# Patient Record
Sex: FEMALE | Race: OTHER | NOT HISPANIC OR LATINO | URBAN - METROPOLITAN AREA
[De-identification: names, ages, dates, MRNs, and addresses within clinical notes are randomized per-mention and may not be internally consistent; named-entity substitution may affect disease eponyms.]

---

## 2022-11-25 ENCOUNTER — INPATIENT (INPATIENT)
Facility: HOSPITAL | Age: 62
LOS: 13 days | Discharge: HOME | DRG: 871 | End: 2022-12-09
Payer: COMMERCIAL

## 2022-11-25 VITALS
HEART RATE: 95 BPM | WEIGHT: 182.1 LBS | OXYGEN SATURATION: 93 % | SYSTOLIC BLOOD PRESSURE: 147 MMHG | HEIGHT: 68 IN | DIASTOLIC BLOOD PRESSURE: 89 MMHG | TEMPERATURE: 100 F | RESPIRATION RATE: 22 BRPM

## 2022-11-25 DIAGNOSIS — Z90.49 ACQUIRED ABSENCE OF OTHER SPECIFIED PARTS OF DIGESTIVE TRACT: Chronic | ICD-10-CM

## 2022-11-25 DIAGNOSIS — J90 PLEURAL EFFUSION, NOT ELSEWHERE CLASSIFIED: ICD-10-CM

## 2022-11-25 DIAGNOSIS — E89.2 POSTPROCEDURAL HYPOPARATHYROIDISM: Chronic | ICD-10-CM

## 2022-11-25 DIAGNOSIS — N60.09 SOLITARY CYST OF UNSPECIFIED BREAST: Chronic | ICD-10-CM

## 2022-11-25 DIAGNOSIS — Z29.9 ENCOUNTER FOR PROPHYLACTIC MEASURES, UNSPECIFIED: ICD-10-CM

## 2022-11-25 DIAGNOSIS — R65.10 SYSTEMIC INFLAMMATORY RESPONSE SYNDROME (SIRS) OF NON-INFECTIOUS ORIGIN WITHOUT ACUTE ORGAN DYSFUNCTION: ICD-10-CM

## 2022-11-25 DIAGNOSIS — J86.9 PYOTHORAX WITHOUT FISTULA: ICD-10-CM

## 2022-11-25 DIAGNOSIS — K57.90 DIVERTICULOSIS OF INTESTINE, PART UNSPECIFIED, WITHOUT PERFORATION OR ABSCESS WITHOUT BLEEDING: ICD-10-CM

## 2022-11-25 DIAGNOSIS — N63.10 UNSPECIFIED LUMP IN THE RIGHT BREAST, UNSPECIFIED QUADRANT: ICD-10-CM

## 2022-11-25 DIAGNOSIS — K50.90 CROHN'S DISEASE, UNSPECIFIED, WITHOUT COMPLICATIONS: ICD-10-CM

## 2022-11-25 LAB
ACANTHOCYTES BLD QL SMEAR: SLIGHT — SIGNIFICANT CHANGE UP
ALBUMIN SERPL ELPH-MCNC: 3.5 G/DL — SIGNIFICANT CHANGE UP (ref 3.3–5)
ALP SERPL-CCNC: 112 U/L — SIGNIFICANT CHANGE UP (ref 40–120)
ALT FLD-CCNC: 18 U/L — SIGNIFICANT CHANGE UP (ref 10–45)
ANION GAP SERPL CALC-SCNC: 11 MMOL/L — SIGNIFICANT CHANGE UP (ref 5–17)
APTT BLD: 29.2 SEC — SIGNIFICANT CHANGE UP (ref 27.5–35.5)
AST SERPL-CCNC: 22 U/L — SIGNIFICANT CHANGE UP (ref 10–40)
BASOPHILS # BLD AUTO: 0 K/UL — SIGNIFICANT CHANGE UP (ref 0–0.2)
BASOPHILS NFR BLD AUTO: 0 % — SIGNIFICANT CHANGE UP (ref 0–2)
BILIRUB SERPL-MCNC: 0.3 MG/DL — SIGNIFICANT CHANGE UP (ref 0.2–1.2)
BLD GP AB SCN SERPL QL: NEGATIVE — SIGNIFICANT CHANGE UP
BUN SERPL-MCNC: 7 MG/DL — SIGNIFICANT CHANGE UP (ref 7–23)
BURR CELLS BLD QL SMEAR: PRESENT — SIGNIFICANT CHANGE UP
CALCIUM SERPL-MCNC: 9.7 MG/DL — SIGNIFICANT CHANGE UP (ref 8.4–10.5)
CHLORIDE SERPL-SCNC: 102 MMOL/L — SIGNIFICANT CHANGE UP (ref 96–108)
CO2 SERPL-SCNC: 26 MMOL/L — SIGNIFICANT CHANGE UP (ref 22–31)
CREAT SERPL-MCNC: 0.58 MG/DL — SIGNIFICANT CHANGE UP (ref 0.5–1.3)
CRP SERPL-MCNC: 320 MG/L — HIGH (ref 0–4)
DACRYOCYTES BLD QL SMEAR: SLIGHT — SIGNIFICANT CHANGE UP
EGFR: 104 ML/MIN/1.73M2 — SIGNIFICANT CHANGE UP
EOSINOPHIL # BLD AUTO: 0.18 K/UL — SIGNIFICANT CHANGE UP (ref 0–0.5)
EOSINOPHIL NFR BLD AUTO: 0.9 % — SIGNIFICANT CHANGE UP (ref 0–6)
GIANT PLATELETS BLD QL SMEAR: PRESENT — SIGNIFICANT CHANGE UP
GLUCOSE SERPL-MCNC: 98 MG/DL — SIGNIFICANT CHANGE UP (ref 70–99)
HCT VFR BLD CALC: 36.3 % — SIGNIFICANT CHANGE UP (ref 34.5–45)
HGB BLD-MCNC: 11.7 G/DL — SIGNIFICANT CHANGE UP (ref 11.5–15.5)
INR BLD: 1.16 — SIGNIFICANT CHANGE UP (ref 0.88–1.16)
LYMPHOCYTES # BLD AUTO: 17.7 % — SIGNIFICANT CHANGE UP (ref 13–44)
LYMPHOCYTES # BLD AUTO: 3.46 K/UL — HIGH (ref 1–3.3)
MANUAL SMEAR VERIFICATION: SIGNIFICANT CHANGE UP
MCHC RBC-ENTMCNC: 29.4 PG — SIGNIFICANT CHANGE UP (ref 27–34)
MCHC RBC-ENTMCNC: 32.2 GM/DL — SIGNIFICANT CHANGE UP (ref 32–36)
MCV RBC AUTO: 91.2 FL — SIGNIFICANT CHANGE UP (ref 80–100)
MONOCYTES # BLD AUTO: 2.43 K/UL — HIGH (ref 0–0.9)
MONOCYTES NFR BLD AUTO: 12.4 % — SIGNIFICANT CHANGE UP (ref 2–14)
NEUTROPHILS # BLD AUTO: 13.5 K/UL — HIGH (ref 1.8–7.4)
NEUTROPHILS NFR BLD AUTO: 69 % — SIGNIFICANT CHANGE UP (ref 43–77)
NRBC # BLD: 1 /100 — HIGH (ref 0–0)
NRBC # BLD: SIGNIFICANT CHANGE UP /100 WBCS (ref 0–0)
PLAT MORPH BLD: ABNORMAL
PLATELET # BLD AUTO: 491 K/UL — HIGH (ref 150–400)
POIKILOCYTOSIS BLD QL AUTO: SLIGHT — SIGNIFICANT CHANGE UP
POTASSIUM SERPL-MCNC: 5.1 MMOL/L — SIGNIFICANT CHANGE UP (ref 3.5–5.3)
POTASSIUM SERPL-SCNC: 5.1 MMOL/L — SIGNIFICANT CHANGE UP (ref 3.5–5.3)
PROCALCITONIN SERPL-MCNC: 0.18 NG/ML — HIGH (ref 0.02–0.1)
PROT SERPL-MCNC: 7.8 G/DL — SIGNIFICANT CHANGE UP (ref 6–8.3)
PROTHROM AB SERPL-ACNC: 13.8 SEC — HIGH (ref 10.5–13.4)
RBC # BLD: 3.98 M/UL — SIGNIFICANT CHANGE UP (ref 3.8–5.2)
RBC # FLD: 13.2 % — SIGNIFICANT CHANGE UP (ref 10.3–14.5)
RBC BLD AUTO: ABNORMAL
RH IG SCN BLD-IMP: POSITIVE — SIGNIFICANT CHANGE UP
SARS-COV-2 RNA SPEC QL NAA+PROBE: NEGATIVE — SIGNIFICANT CHANGE UP
SCHISTOCYTES BLD QL AUTO: SIGNIFICANT CHANGE UP
SODIUM SERPL-SCNC: 139 MMOL/L — SIGNIFICANT CHANGE UP (ref 135–145)
SPHEROCYTES BLD QL SMEAR: SLIGHT — SIGNIFICANT CHANGE UP
TARGETS BLD QL SMEAR: SLIGHT — SIGNIFICANT CHANGE UP
WBC # BLD: 19.56 K/UL — HIGH (ref 3.8–10.5)
WBC # FLD AUTO: 19.56 K/UL — HIGH (ref 3.8–10.5)

## 2022-11-25 PROCEDURE — 71045 X-RAY EXAM CHEST 1 VIEW: CPT | Mod: 26

## 2022-11-25 PROCEDURE — 99285 EMERGENCY DEPT VISIT HI MDM: CPT | Mod: 25

## 2022-11-25 RX ORDER — PIPERACILLIN AND TAZOBACTAM 4; .5 G/20ML; G/20ML
3.38 INJECTION, POWDER, LYOPHILIZED, FOR SOLUTION INTRAVENOUS ONCE
Refills: 0 | Status: COMPLETED | OUTPATIENT
Start: 2022-11-25 | End: 2022-11-25

## 2022-11-25 RX ORDER — BUDESONIDE AND FORMOTEROL FUMARATE DIHYDRATE 160; 4.5 UG/1; UG/1
2 AEROSOL RESPIRATORY (INHALATION)
Refills: 0 | Status: DISCONTINUED | OUTPATIENT
Start: 2022-11-25 | End: 2022-12-09

## 2022-11-25 RX ORDER — TRAMADOL HYDROCHLORIDE 50 MG/1
25 TABLET ORAL ONCE
Refills: 0 | Status: DISCONTINUED | OUTPATIENT
Start: 2022-11-25 | End: 2022-11-25

## 2022-11-25 RX ORDER — PIPERACILLIN AND TAZOBACTAM 4; .5 G/20ML; G/20ML
3.38 INJECTION, POWDER, LYOPHILIZED, FOR SOLUTION INTRAVENOUS EVERY 8 HOURS
Refills: 0 | Status: DISCONTINUED | OUTPATIENT
Start: 2022-11-25 | End: 2022-12-01

## 2022-11-25 RX ORDER — SODIUM CHLORIDE 9 MG/ML
1000 INJECTION, SOLUTION INTRAVENOUS
Refills: 0 | Status: DISCONTINUED | OUTPATIENT
Start: 2022-11-25 | End: 2022-11-27

## 2022-11-25 RX ORDER — ACETAMINOPHEN 500 MG
650 TABLET ORAL EVERY 6 HOURS
Refills: 0 | Status: DISCONTINUED | OUTPATIENT
Start: 2022-11-25 | End: 2022-12-09

## 2022-11-25 RX ORDER — ACETAMINOPHEN 500 MG
650 TABLET ORAL ONCE
Refills: 0 | Status: COMPLETED | OUTPATIENT
Start: 2022-11-25 | End: 2022-11-25

## 2022-11-25 RX ORDER — BUDESONIDE AND FORMOTEROL FUMARATE DIHYDRATE 160; 4.5 UG/1; UG/1
2 AEROSOL RESPIRATORY (INHALATION)
Qty: 0 | Refills: 0 | DISCHARGE

## 2022-11-25 RX ADMIN — Medication 650 MILLIGRAM(S): at 17:00

## 2022-11-25 RX ADMIN — TRAMADOL HYDROCHLORIDE 25 MILLIGRAM(S): 50 TABLET ORAL at 23:10

## 2022-11-25 RX ADMIN — SODIUM CHLORIDE 120 MILLILITER(S): 9 INJECTION, SOLUTION INTRAVENOUS at 19:41

## 2022-11-25 RX ADMIN — PIPERACILLIN AND TAZOBACTAM 25 GRAM(S): 4; .5 INJECTION, POWDER, LYOPHILIZED, FOR SOLUTION INTRAVENOUS at 23:43

## 2022-11-25 RX ADMIN — PIPERACILLIN AND TAZOBACTAM 200 GRAM(S): 4; .5 INJECTION, POWDER, LYOPHILIZED, FOR SOLUTION INTRAVENOUS at 17:05

## 2022-11-25 RX ADMIN — TRAMADOL HYDROCHLORIDE 25 MILLIGRAM(S): 50 TABLET ORAL at 22:03

## 2022-11-25 NOTE — H&P ADULT - PROBLEM SELECTOR PLAN 2
Pt with low-grade temperatures (Tmax 99F), acute on chronic cough, CP, dyspnea, and weakness x1 week. CXR with L pleural effusion. CT chest/abdomen at outside hospital on 11/23 with L pleural effusion and probable partially cystic mass at L lung base.    - see "SIRS" plan Pt with low-grade temperatures (Tmax 99F), acute on chronic cough, CP, dyspnea, and weakness x1 week. CXR with L pleural effusion. CT chest/abdomen at outside hospital on 11/23 with L pleural effusion and probable partially cystic mass at L lung base.     - see "SIRS" plan Pt with low-grade temperatures (Tmax 99F), acute on chronic cough, CP, dyspnea, and weakness x1 week. CXR with L pleural effusion. CT chest/abdomen at outside hospital on 11/23 with L pleural effusion and probable partially cystic mass at L lung base. Was diagnosed with empyema at outside hospital and sent home with Augmentin and azithromycin.    - see "SIRS" plan

## 2022-11-25 NOTE — H&P ADULT - PROBLEM SELECTOR PLAN 6
Plan:  F: prn  E: replete K<4, Mg<2  N: regular  VTE Prophylaxis: Lovenox 40mg q24h   GI: none  C: Full Code  D: RENE Plan:  F: prn  E: replete K<4, Mg<2  N: regular; NPO at midnight for possible procedure tomorrow   VTE Prophylaxis: Hold AC for possible procedure tomorrow   GI: none  C: Full Code  D: RENE Plan:  F: LR @120cc/hr x10hr  E: replete K<4, Mg<2  N: regular; NPO at midnight for possible procedure tomorrow   VTE Prophylaxis: Hold AC for possible procedure tomorrow   GI: none  C: Full Code  D: [facility] CT chest/abdomen on 11/23 at outside hospital with colonic diverticulosis. Pt asymptomatic currently.     - F/u outpt

## 2022-11-25 NOTE — ED ADULT NURSE NOTE - OBJECTIVE STATEMENT
63 y/o F, c/o SOB, had CT/ XR on 11/23 showing L sided pleural effusion, empyema, L sided cystic mass. Here today co worsening SOB and CP. Denies fevers, chills, productive cough.

## 2022-11-25 NOTE — H&P ADULT - NSICDXFAMILYHX_GEN_ALL_CORE_FT
FAMILY HISTORY:  Father  Still living? Unknown  FH: lung cancer, Age at diagnosis: Age Unknown    Mother  Still living? Unknown  Family history of CLL (chronic lymphoid leukemia), Age at diagnosis: Age Unknown

## 2022-11-25 NOTE — H&P ADULT - PROBLEM SELECTOR PLAN 5
CT chest/abdomen on 11/23 at outside hospital with colonic diverticulosis. Pt asymptomatic currently.     - F/u outpt Pt recently diagnosed with Crohn's disease on outpt colonoscopy (7/26/2022). Pt asymptomatic currently, not on any meds.    - F/u outpt

## 2022-11-25 NOTE — H&P ADULT - ASSESSMENT
63 y/o F with PMHx of Crohn's, diverticulosis, asthma, and benign breast cyst (in 1989) presented with acute on chronic cough, weakness, dyspnea, and chest pain x 1 week, CXR with L pleural effusion, admitted for further management.  61 y/o F with PMHx of Crohn's, diverticulosis, asthma, and benign breast cyst (in 1989) presented with acute on chronic cough, weakness, dyspnea, and chest pain x 1 week, CXR with L pleural effusion, found to meet SIRS criteria, likely 2/2 empyema and L pleural effusion.

## 2022-11-25 NOTE — ED PROVIDER NOTE - OBJECTIVE STATEMENT
63 y/o F, PMHx of diverticulitis s/p cholecystectomy, pre-DM, now presenting to ED for SOB and chest pain. At presentation, pt has CT/XR documents showing L sided PE, empyema, and L sided cystic mass. Pt was Rx on Augment and Z-Yazan. She reports feeling very dehydrated last night and was told to come to the ED for further evaluation. 63 y/o F, PMHx of diverticulitis s/p cholecystectomy, pre-DM, now presenting to ED for SOB and chest pain- sx onset 5-6 days ago- had L upper abd pain- had CT - chest abd/pelvis  showing L sided Pleural effusion, empyema, and L sided cystic mass. Pt was Rx on Augment and Z-Yazan. She reports feeling very dehydrated last night and was told to come to the ED for further evaluation  co sob int cough  no n/v   + fever  breathing improves with sitting up/worsens w laying down

## 2022-11-25 NOTE — ED ADULT TRIAGE NOTE - CHIEF COMPLAINT QUOTE
Pt had CT/ XR on 11/23 showing L sided pleural effusion, empyema, L sided cystic mass. Here today co worsening SOB and CP. Denies fevers, chills, productive cough.

## 2022-11-25 NOTE — H&P ADULT - NSHPPHYSICALEXAM_GEN_ALL_CORE
.  VITAL SIGNS:  T(F): 99.1 (11-25-22 @ 17:12), Max: 99.8 (11-25-22 @ 14:36)  HR: 92 (11-25-22 @ 17:12) (92 - 95)  BP: 117/72 (11-25-22 @ 17:12) (117/72 - 147/89)  BP(mean): --  RR: 18 (11-25-22 @ 17:12) (18 - 22)  SpO2: 98% (11-25-22 @ 17:12) (93% - 98%)    PHYSICAL EXAM:    Constitutional: resting comfortably in bed; NAD  HEENT: NC/AT, PERRL, EOMI, anicteric sclera, no nasal discharge; uvula midline, no oropharyngeal erythema or exudates; MMM  Neck: supple; no JVD or thyromegaly  Respiratory: unlabored breathing, CTA B/L; no W/Rhonchi/Crackles, no retractions or use of accessory muscles   Cardiac: +S1/S2; RRR; no M/R/G; No ventricular heaves, PMI non-displaced  Gastrointestinal: soft, NT/ND; no rebound or guarding; +BSx4  Genitourinary: normal external genitalia  Back: spine midline, no bony tenderness or step-offs; no CVAT B/L  Extremities: WWP, no clubbing or cyanosis; no peripheral edema  Musculoskeletal: NROM x4; no joint swelling, tenderness or erythema  Vascular: 2+ radial, DP/PT pulses B/L  Dermatologic: skin warm, dry and intact; no rashes, wounds, or scars  Lymphatic: no submandibular or cervical LAD  Neurologic: AAOx3; CNII-XII grossly intact; no focal deficits  Psychiatric: affect and characteristics of appearance, verbalizations, behaviors are appropriate .  VITAL SIGNS:  T(F): 99.1 (11-25-22 @ 17:12), Max: 99.8 (11-25-22 @ 14:36)  HR: 92 (11-25-22 @ 17:12) (92 - 95)  BP: 117/72 (11-25-22 @ 17:12) (117/72 - 147/89)  BP(mean): --  RR: 18 (11-25-22 @ 17:12) (18 - 22)  SpO2: 98% (11-25-22 @ 17:12) (93% - 98%)    PHYSICAL EXAM:    Constitutional: resting comfortably in bed on 2L NC; NAD  HEENT: NC/AT, PERRL, EOMI, anicteric sclera, no nasal discharge; MMM  Neck: supple; no JVD  Respiratory: unlabored breathing, decreased breath sounds to left lung; no wheezing, rhonchi, or crackles, no intercostal retractions, no accessory muscle use, no nasal flaring  Cardiac: +S1/S2; RRR; no M/R/G  Gastrointestinal: soft, NT/ND; no rebound or guarding; +BS  Extremities: no clubbing or cyanosis; no peripheral edema  Vascular: 2+ radial pulses B/L  Dermatologic: skin warm, dry and intact  Neurologic: AAOx3; CNII-XII grossly intact; no focal deficits  Psychiatric: affect and characteristics of appearance, verbalizations, behaviors are appropriate

## 2022-11-25 NOTE — H&P ADULT - NSHPLABSRESULTS_GEN_ALL_CORE
.  LABS:                         11.7   19.56 )-----------( 491      ( 25 Nov 2022 15:12 )             36.3     11-25    139  |  102  |  7   ----------------------------<  98  5.1   |  26  |  0.58    Ca    9.7      25 Nov 2022 15:12    TPro  7.8  /  Alb  3.5  /  TBili  0.3  /  DBili  x   /  AST  22  /  ALT  18  /  AlkPhos  112  11-25    PT/INR - ( 25 Nov 2022 15:28 )   PT: 13.8 sec;   INR: 1.16          PTT - ( 25 Nov 2022 15:28 )  PTT:29.2 sec              RADIOLOGY, EKG & ADDITIONAL TESTS: Reviewed.

## 2022-11-25 NOTE — H&P ADULT - NSHPREVIEWOFSYSTEMS_GEN_ALL_CORE
Constitutional: +low-grade temperatures (Tmax 99F), weakness  Cardiac: +CP  Resp: +dyspnea, productive cough  Abd: No abd pain, N/V/D  : No dysuria  Neuro: No headache or dizziness

## 2022-11-25 NOTE — H&P ADULT - PROBLEM SELECTOR PLAN 1
Pt met 3/4 SIRS criteria on admission: HR 95, RR 22, WBC 19k. Pt with low-grade temperatures (Tmax 99F), acute on chronic cough, CP, dyspnea, and weakness x1 week. CXR with L pleural effusion. CT chest/abdomen at outside hospital on 11/23 with L pleural effusion and probable partially cystic mass at L lung base. Sx likely secondary to empyema. s/p zosyn 3.375 x1 in ED.     Plan:   - c/w zosyn 3.375g q6h  - F/u BCx  - Plan for bronch tomorrow Pt met 3/4 SIRS criteria on admission: HR 95, RR 22, WBC 19k. Pt with low-grade temperatures (Tmax 99F), acute on chronic cough, CP, dyspnea, and weakness x1 week. CXR with L pleural effusion. CT chest/abdomen at outside hospital on 11/23 with L pleural effusion and probable partially cystic mass at L lung base. Sx likely secondary to empyema. s/p zosyn 3.375 x1 in ED.     Plan:   - c/w zosyn 3.375g q6h  - LR @ 120cc/hr x10h   - F/u BCx  - F/u urine Strep, Legionella  - F/u MRSA swab   - Pulm consult in AM  - NPO at midnight and hold AC; plan for possible bronchoscopy and/or thoracentesis in AM Pt met 3/4 SIRS criteria on admission: HR 95, RR 22, WBC 19k. Pt with low-grade temperatures (Tmax 99F), acute on chronic cough, CP, dyspnea, and weakness x1 week. CXR with L pleural effusion. CT chest/abdomen at outside hospital on 11/23 with L pleural effusion and probable partially cystic mass at L lung base. Sx likely secondary to empyema. s/p zosyn 3.375 x1 in ED.     Plan:   - c/w zosyn 3.375g q6h  - LR @ 120cc/hr x10h   - F/u BCx  - F/u urine Strep, Legionella  - F/u MRSA swab, RVP   - Pulm consult in AM  - NPO at midnight and hold AC; plan for possible bronchoscopy and/or thoracentesis in AM

## 2022-11-25 NOTE — ED PROVIDER NOTE - RESPIRATORY, MLM
Decreased breath sounds to Lower left lung. No respiratory effort noted. Decreased breath sounds to Lower left lung. Normal respiratory effort noted.

## 2022-11-25 NOTE — PATIENT PROFILE ADULT - HOME ACCESSIBILITY CONCERNS - OTHER
has bathroom downstairs and upstairs but none on main level and has been very SOB/difficult to get to BR

## 2022-11-25 NOTE — H&P ADULT - NSICDXPASTSURGICALHX_GEN_ALL_CORE_FT
PAST SURGICAL HISTORY:  Benign cyst of breast     History of cholecystectomy     S/P parathyroidectomy

## 2022-11-25 NOTE — H&P ADULT - PROBLEM SELECTOR PLAN 7
Plan:  F: LR @120cc/hr x10hr  E: replete K<4, Mg<2  N: regular; NPO at midnight for possible procedure tomorrow   VTE Prophylaxis: Hold AC for possible procedure tomorrow   GI: none  C: Full Code  D: [facility]

## 2022-11-25 NOTE — H&P ADULT - PROBLEM SELECTOR PLAN 3
CT chest/abdomen at outside hospital with retroareolar mass. Per patient, last mammogram ~4 years ago, nml.     - F/u outpt for further workup Pt with low-grade temperatures (Tmax 99F), acute on chronic cough, CP, dyspnea, and weakness x1 week. CXR with L pleural effusion. CT chest/abdomen at outside hospital on 11/23 with L pleural effusion and probable partially cystic mass at L lung base.     - see "SIRS" plan

## 2022-11-25 NOTE — H&P ADULT - PROBLEM SELECTOR PLAN 4
Pt recently diagnosed with Crohn's disease on outpt colonoscopy (7/26/2022). Pt asymptomatic currently, not on any meds.    - F/u outpt CT chest/abdomen at outside hospital with retroareolar mass. Per patient, last mammogram ~4 years ago, nml.     - F/u outpt for further workup

## 2022-11-26 LAB
ALBUMIN SERPL ELPH-MCNC: 3.1 G/DL — LOW (ref 3.3–5)
ALP SERPL-CCNC: 108 U/L — SIGNIFICANT CHANGE UP (ref 40–120)
ALT FLD-CCNC: 22 U/L — SIGNIFICANT CHANGE UP (ref 10–45)
ANION GAP SERPL CALC-SCNC: 8 MMOL/L — SIGNIFICANT CHANGE UP (ref 5–17)
APTT BLD: 29.4 SEC — SIGNIFICANT CHANGE UP (ref 27.5–35.5)
AST SERPL-CCNC: 19 U/L — SIGNIFICANT CHANGE UP (ref 10–40)
BASOPHILS # BLD AUTO: 0.04 K/UL — SIGNIFICANT CHANGE UP (ref 0–0.2)
BASOPHILS NFR BLD AUTO: 0.2 % — SIGNIFICANT CHANGE UP (ref 0–2)
BILIRUB SERPL-MCNC: 0.4 MG/DL — SIGNIFICANT CHANGE UP (ref 0.2–1.2)
BLD GP AB SCN SERPL QL: NEGATIVE — SIGNIFICANT CHANGE UP
BUN SERPL-MCNC: 6 MG/DL — LOW (ref 7–23)
CALCIUM SERPL-MCNC: 8.8 MG/DL — SIGNIFICANT CHANGE UP (ref 8.4–10.5)
CHLORIDE SERPL-SCNC: 102 MMOL/L — SIGNIFICANT CHANGE UP (ref 96–108)
CO2 SERPL-SCNC: 27 MMOL/L — SIGNIFICANT CHANGE UP (ref 22–31)
CREAT SERPL-MCNC: 0.71 MG/DL — SIGNIFICANT CHANGE UP (ref 0.5–1.3)
EGFR: 97 ML/MIN/1.73M2 — SIGNIFICANT CHANGE UP
EOSINOPHIL # BLD AUTO: 0.15 K/UL — SIGNIFICANT CHANGE UP (ref 0–0.5)
EOSINOPHIL NFR BLD AUTO: 0.9 % — SIGNIFICANT CHANGE UP (ref 0–6)
GLUCOSE SERPL-MCNC: 120 MG/DL — HIGH (ref 70–99)
HCT VFR BLD CALC: 33 % — LOW (ref 34.5–45)
HCV AB S/CO SERPL IA: 0.03 S/CO — SIGNIFICANT CHANGE UP
HCV AB SERPL-IMP: SIGNIFICANT CHANGE UP
HGB BLD-MCNC: 10.4 G/DL — LOW (ref 11.5–15.5)
IMM GRANULOCYTES NFR BLD AUTO: 0.6 % — SIGNIFICANT CHANGE UP (ref 0–0.9)
INR BLD: 1.38 — HIGH (ref 0.88–1.16)
LYMPHOCYTES # BLD AUTO: 16.7 % — SIGNIFICANT CHANGE UP (ref 13–44)
LYMPHOCYTES # BLD AUTO: 2.79 K/UL — SIGNIFICANT CHANGE UP (ref 1–3.3)
MAGNESIUM SERPL-MCNC: 2 MG/DL — SIGNIFICANT CHANGE UP (ref 1.6–2.6)
MCHC RBC-ENTMCNC: 29.1 PG — SIGNIFICANT CHANGE UP (ref 27–34)
MCHC RBC-ENTMCNC: 31.5 GM/DL — LOW (ref 32–36)
MCV RBC AUTO: 92.2 FL — SIGNIFICANT CHANGE UP (ref 80–100)
MONOCYTES # BLD AUTO: 2 K/UL — HIGH (ref 0–0.9)
MONOCYTES NFR BLD AUTO: 11.9 % — SIGNIFICANT CHANGE UP (ref 2–14)
NEUTROPHILS # BLD AUTO: 11.67 K/UL — HIGH (ref 1.8–7.4)
NEUTROPHILS NFR BLD AUTO: 69.7 % — SIGNIFICANT CHANGE UP (ref 43–77)
NRBC # BLD: 0 /100 WBCS — SIGNIFICANT CHANGE UP (ref 0–0)
PHOSPHATE SERPL-MCNC: 3.3 MG/DL — SIGNIFICANT CHANGE UP (ref 2.5–4.5)
PLATELET # BLD AUTO: 558 K/UL — HIGH (ref 150–400)
POTASSIUM SERPL-MCNC: 4.7 MMOL/L — SIGNIFICANT CHANGE UP (ref 3.5–5.3)
POTASSIUM SERPL-SCNC: 4.7 MMOL/L — SIGNIFICANT CHANGE UP (ref 3.5–5.3)
PROT SERPL-MCNC: 6.6 G/DL — SIGNIFICANT CHANGE UP (ref 6–8.3)
PROTHROM AB SERPL-ACNC: 16.5 SEC — HIGH (ref 10.5–13.4)
RBC # BLD: 3.58 M/UL — LOW (ref 3.8–5.2)
RBC # FLD: 13.1 % — SIGNIFICANT CHANGE UP (ref 10.3–14.5)
RH IG SCN BLD-IMP: POSITIVE — SIGNIFICANT CHANGE UP
SODIUM SERPL-SCNC: 137 MMOL/L — SIGNIFICANT CHANGE UP (ref 135–145)
WBC # BLD: 16.75 K/UL — HIGH (ref 3.8–10.5)
WBC # FLD AUTO: 16.75 K/UL — HIGH (ref 3.8–10.5)

## 2022-11-26 PROCEDURE — 99232 SBSQ HOSP IP/OBS MODERATE 35: CPT

## 2022-11-26 RX ORDER — ACETAMINOPHEN 500 MG
1000 TABLET ORAL ONCE
Refills: 0 | Status: COMPLETED | OUTPATIENT
Start: 2022-11-26 | End: 2022-11-26

## 2022-11-26 RX ADMIN — Medication 650 MILLIGRAM(S): at 16:40

## 2022-11-26 RX ADMIN — PIPERACILLIN AND TAZOBACTAM 25 GRAM(S): 4; .5 INJECTION, POWDER, LYOPHILIZED, FOR SOLUTION INTRAVENOUS at 06:27

## 2022-11-26 RX ADMIN — Medication 100 MILLIGRAM(S): at 14:12

## 2022-11-26 RX ADMIN — PIPERACILLIN AND TAZOBACTAM 25 GRAM(S): 4; .5 INJECTION, POWDER, LYOPHILIZED, FOR SOLUTION INTRAVENOUS at 22:57

## 2022-11-26 RX ADMIN — BUDESONIDE AND FORMOTEROL FUMARATE DIHYDRATE 2 PUFF(S): 160; 4.5 AEROSOL RESPIRATORY (INHALATION) at 06:27

## 2022-11-26 RX ADMIN — Medication 400 MILLIGRAM(S): at 06:50

## 2022-11-26 RX ADMIN — Medication 1000 MILLIGRAM(S): at 08:45

## 2022-11-26 RX ADMIN — PIPERACILLIN AND TAZOBACTAM 25 GRAM(S): 4; .5 INJECTION, POWDER, LYOPHILIZED, FOR SOLUTION INTRAVENOUS at 14:01

## 2022-11-26 RX ADMIN — BUDESONIDE AND FORMOTEROL FUMARATE DIHYDRATE 2 PUFF(S): 160; 4.5 AEROSOL RESPIRATORY (INHALATION) at 17:32

## 2022-11-26 NOTE — DIETITIAN INITIAL EVALUATION ADULT - NS FNS DIET ORDER
Diet, NPO after Midnight:      NPO Start Date: 25-Nov-2022,   NPO Start Time: 23:59  Except Medications (11-25-22 @ 18:07)

## 2022-11-26 NOTE — PROGRESS NOTE ADULT - PROBLEM SELECTOR PLAN 7
Plan:  F: LR @120cc/hr x10hr  E: replete K<4, Mg<2  N: regular; NPO at midnight for possible procedure tomorrow   VTE Prophylaxis: Hold AC for possible procedure tomorrow   GI: none  C: Full Code  [facility]

## 2022-11-26 NOTE — PROGRESS NOTE ADULT - SUBJECTIVE AND OBJECTIVE BOX
OVERNIGHT EVENTS:    SUBJECTIVE / INTERVAL HPI: Patient seen and examined at bedside.     VITAL SIGNS:  Vital Signs Last 24 Hrs  T(C): 37.2 (26 Nov 2022 08:45), Max: 37.9 (26 Nov 2022 06:29)  T(F): 98.9 (26 Nov 2022 08:45), Max: 100.3 (26 Nov 2022 06:29)  HR: 83 (26 Nov 2022 08:45) (78 - 95)  BP: 107/64 (26 Nov 2022 08:45) (101/62 - 147/89)  BP(mean): --  RR: 20 (26 Nov 2022 08:45) (18 - 22)  SpO2: 95% (26 Nov 2022 08:45) (93% - 98%)    Parameters below as of 26 Nov 2022 08:45  Patient On (Oxygen Delivery Method): nasal cannula  O2 Flow (L/min): 2      PHYSICAL EXAM:    General: WDWN  HEENT: NCAT; PERRL, anicteric sclera; MMM  Neck: supple, trachea midline  Cardiovascular: S1, S2 normal; RRR, no M/G/R  Respiratory: CTABL; no W/R/R  Gastrointestinal: soft, nontender, nondistended. bowel sounds present.  Skin: no ulcerations or visible rashes appreciated  Extremities: WWP; no edema, clubbing or cyanosis  Vascular: 2+ radial, DP/PT pulses B/L  Neurological: AAOx3; CN II-XII grossly intact; no focal deficits    MEDICATIONS:  MEDICATIONS  (STANDING):  budesonide 160 MICROgram(s)/formoterol 4.5 MICROgram(s) Inhaler 2 Puff(s) Inhalation two times a day  lactated ringers. 1000 milliLiter(s) (120 mL/Hr) IV Continuous <Continuous>  piperacillin/tazobactam IVPB.. 3.375 Gram(s) IV Intermittent every 8 hours    MEDICATIONS  (PRN):  acetaminophen     Tablet .. 650 milliGRAM(s) Oral every 6 hours PRN Temp greater or equal to 38C (100.4F), Mild Pain (1 - 3)      ALLERGIES:  Allergies    tetracyclines (Swelling)    Intolerances        LABS:                        10.4   16.75 )-----------( 558      ( 26 Nov 2022 08:14 )             33.0     11-26    137  |  102  |  6<L>  ----------------------------<  120<H>  4.7   |  27  |  0.71    Ca    8.8      26 Nov 2022 08:14  Phos  3.3     11-26  Mg     2.0     11-26    TPro  6.6  /  Alb  3.1<L>  /  TBili  0.4  /  DBili  x   /  AST  19  /  ALT  22  /  AlkPhos  108  11-26    PT/INR - ( 26 Nov 2022 08:14 )   PT: 16.5 sec;   INR: 1.38          PTT - ( 26 Nov 2022 08:14 )  PTT:29.4 sec    CAPILLARY BLOOD GLUCOSE          RADIOLOGY & ADDITIONAL TESTS: Reviewed. OVERNIGHT EVENTS:  LAURENCE ON    SUBJECTIVE / INTERVAL HPI: Patient seen and examined at bedside. NPO dc'd.    CONSTITUTIONAL: c/o weakness, denies fevers or chills  EYES/ENT: No visual changes;  No vertigo or throat pain   NECK: No pain or stiffness  RESPIRATORY: c/o cough, wheezing, and mild shortness of breath  CARDIOVASCULAR: No chest pain or palpitations  GASTROINTESTINAL: No abdominal or epigastric pain. No nausea, vomiting, or hematemesis; No BM in 2 days.  GENITOURINARY: No dysuria, frequency or hematuria  NEUROLOGICAL: No numbness or weakness  SKIN: No itching, burning, rashes, or lesions   All other review of systems is negative unless indicated above.    VITAL SIGNS:  Vital Signs Last 24 Hrs  T(C): 37.2 (26 Nov 2022 08:45), Max: 37.9 (26 Nov 2022 06:29)  T(F): 98.9 (26 Nov 2022 08:45), Max: 100.3 (26 Nov 2022 06:29)  HR: 83 (26 Nov 2022 08:45) (78 - 95)  BP: 107/64 (26 Nov 2022 08:45) (101/62 - 147/89)  BP(mean): --  RR: 20 (26 Nov 2022 08:45) (18 - 22)  SpO2: 95% (26 Nov 2022 08:45) (93% - 98%)    Parameters below as of 26 Nov 2022 08:45  Patient On (Oxygen Delivery Method): nasal cannula  O2 Flow (L/min): 2      PHYSICAL EXAM:    General: NAD, lying in bed comfortably with family at bedside, talkative and cooperative  HEENT: NCAT; PERRL, anicteric sclera; MMM  Neck: supple, trachea midline  Cardiovascular: S1, S2 normal; RRR, no M/G/R  Respiratory: decreased breath sounds L lung,  Gastrointestinal: soft, nontender, nondistended. bowel sounds present.  Skin: no ulcerations or visible rashes appreciated  Extremities: WWP; no edema, clubbing or cyanosis  Vascular: 2+ radial, DP/PT pulses B/L  Neurological: AAOx3; CN II-XII grossly intact; no focal deficits    MEDICATIONS:  MEDICATIONS  (STANDING):  budesonide 160 MICROgram(s)/formoterol 4.5 MICROgram(s) Inhaler 2 Puff(s) Inhalation two times a day  lactated ringers. 1000 milliLiter(s) (120 mL/Hr) IV Continuous <Continuous>  piperacillin/tazobactam IVPB.. 3.375 Gram(s) IV Intermittent every 8 hours    MEDICATIONS  (PRN):  acetaminophen     Tablet .. 650 milliGRAM(s) Oral every 6 hours PRN Temp greater or equal to 38C (100.4F), Mild Pain (1 - 3)      ALLERGIES:  Allergies    tetracyclines (Swelling)    Intolerances        LABS:                        10.4   16.75 )-----------( 558      ( 26 Nov 2022 08:14 )             33.0     11-26    137  |  102  |  6<L>  ----------------------------<  120<H>  4.7   |  27  |  0.71    Ca    8.8      26 Nov 2022 08:14  Phos  3.3     11-26  Mg     2.0     11-26    TPro  6.6  /  Alb  3.1<L>  /  TBili  0.4  /  DBili  x   /  AST  19  /  ALT  22  /  AlkPhos  108  11-26    PT/INR - ( 26 Nov 2022 08:14 )   PT: 16.5 sec;   INR: 1.38          PTT - ( 26 Nov 2022 08:14 )  PTT:29.4 sec    CAPILLARY BLOOD GLUCOSE          RADIOLOGY & ADDITIONAL TESTS: Reviewed.

## 2022-11-26 NOTE — PROGRESS NOTE ADULT - ASSESSMENT
61 y/o F with PMHx of Crohn's, diverticulosis, asthma, and benign breast cyst (in 1989) presented with acute on chronic cough, weakness, dyspnea, and chest pain x 1 week, CXR with L pleural effusion, found to meet SIRS criteria, likely 2/2 empyema and L pleural effusion.

## 2022-11-26 NOTE — PROGRESS NOTE ADULT - PROBLEM SELECTOR PLAN 1
Pt met 3/4 SIRS criteria on admission: HR 95, RR 22, WBC 19k. Pt with low-grade temperatures (Tmax 99F), acute on chronic cough, CP, dyspnea, and weakness x1 week. CXR with L pleural effusion. CT chest/abdomen at outside hospital on 11/23 with L pleural effusion and probable partially cystic mass at L lung base. Sx likely secondary to empyema. s/p zosyn 3.375 x1 in ED.     Plan:   - c/w zosyn 3.375g q6h  - LR @ 120cc/hr x10h   - F/u BCx  - F/u urine Strep, Legionella  - F/u MRSA swab, RVP   - Pulm consult in AM  - NPO at midnight and hold AC; plan for possible bronchoscopy and/or thoracentesis in AM

## 2022-11-26 NOTE — PROGRESS NOTE ADULT - PROBLEM SELECTOR PLAN 3
Pt with low-grade temperatures (Tmax 99F), acute on chronic cough, CP, dyspnea, and weakness x1 week. CXR with L pleural effusion. CT chest/abdomen at outside hospital on 11/23 with L pleural effusion and probable partially cystic mass at L lung base.     - see "SIRS" plan

## 2022-11-26 NOTE — PROGRESS NOTE ADULT - PROBLEM SELECTOR PLAN 4
CT chest/abdomen at outside hospital with retroareolar mass. Per patient, last mammogram ~4 years ago, nml.     - F/u outpt for further workup

## 2022-11-26 NOTE — PROGRESS NOTE ADULT - PROBLEM SELECTOR PLAN 7
Plan:  F: LR @120cc/hr x10hr  E: replete K<4, Mg<2  N: regular; NPO at midnight for possible procedure tomorrow   VTE Prophylaxis: Hold AC for possible procedure tomorrow   GI: none  C: Full Code  D: [facility] Plan:  F: LR @120cc/hr x10hr  E: replete K<4, Mg<2  N: kosher diet  VTE: none  GI: none  C: Full Code  D: 7Wo

## 2022-11-26 NOTE — PROGRESS NOTE ADULT - PROBLEM SELECTOR PLAN 2
Pt with low-grade temperatures (Tmax 99F), acute on chronic cough, CP, dyspnea, and weakness x1 week. CXR with L pleural effusion. CT chest/abdomen at outside hospital on 11/23 with L pleural effusion and probable partially cystic mass at L lung base. Was diagnosed with empyema at outside hospital and sent home with Augmentin and azithromycin.  Tmax 100.3 F, IV tylenol given    - see "SIRS" plan

## 2022-11-26 NOTE — PROGRESS NOTE ADULT - PROBLEM SELECTOR PLAN 5
Pt recently diagnosed with Crohn's disease on outpt colonoscopy (7/26/2022). Pt asymptomatic currently, not on any meds.    - F/u outpt

## 2022-11-26 NOTE — PROGRESS NOTE ADULT - SUBJECTIVE AND OBJECTIVE BOX
Interval Events: Reviewed  Patient seen and examined at bedside.    Patient is a 62y old  Female who presents with a chief complaint of productive cough and weakness.  Tmax 100.3F, IV tylenol given,  2L NC 96%    PAST MEDICAL & SURGICAL HISTORY:  Prediabetes      Diverticulosis      Crohn&#x27;s disease      History of cholecystectomy      S/P parathyroidectomy      Benign cyst of breast          MEDICATIONS:  Pulmonary:  budesonide 160 MICROgram(s)/formoterol 4.5 MICROgram(s) Inhaler 2 Puff(s) Inhalation two times a day    Antimicrobials:  piperacillin/tazobactam IVPB.. 3.375 Gram(s) IV Intermittent every 8 hours    Anticoagulants:    Cardiac:      Allergies    tetracyclines (Swelling)    Intolerances        Vital Signs Last 24 Hrs  T(C): 37.9 (26 Nov 2022 06:29), Max: 37.9 (26 Nov 2022 06:29)  T(F): 100.3 (26 Nov 2022 06:29), Max: 100.3 (26 Nov 2022 06:29)  HR: 92 (26 Nov 2022 05:49) (78 - 95)  BP: 111/74 (26 Nov 2022 05:49) (101/62 - 147/89)  BP(mean): --  RR: 19 (26 Nov 2022 05:49) (18 - 22)  SpO2: 94% (26 Nov 2022 05:49) (93% - 98%)    Parameters below as of 26 Nov 2022 05:49  Patient On (Oxygen Delivery Method): nasal cannula  O2 Flow (L/min): 2          Review of Systems:   •	General: negative  •	Skin/Breast: negative  •	Ophthalmologic: negative  •	ENMT: negative  •	Respiratory and Thorax: negative  •	Cardiovascular: negative  •	Gastrointestinal: negative  •	Genitourinary: negative  •	Musculoskeletal: negative  •	Neurological: negative  •	Psychiatric: negative  •	Hematology/Lymphatics: negative  •	Endocrine: negative  •	Allergic/Immunologic: negative    Physical Exam:   • Constitutional:	NAD  • Eyes:	EOMI; PERRL; no drainage or redness  • ENMT:	No oral lesions; no gross abnormalities  • Neck	no thyromegaly or nodules  • Breasts:	not examined  • Back:	No deformity or limitation of movement  • Respiratory:	Breath Sounds equal & clear to auscultation, no accessory muscle use, decreased breath sounds on left lung  • Cardiovascular:	Regular rate & rhythm, normal S1, S2; no murmurs, gallops or rubs; no S3, S4  • Gastrointestinal:	Soft, non-tender, no hepatosplenomegaly, normal bowel sounds  • Genitourinary:	not examined  • Rectal: not examined  • Extremities:	No cyanosis, clubbing or edema  • Vascular:	Equal and normal pulses (dorsalis pedis)  • Neurologica:l	not examined  • Skin:	No lesions; no rash  • Lymph Nodes:	No lymphadedenopathy  • Musculoskeletal:	No joint pain, swelling or deformity; no limitation of movement        LABS:      CBC Full  -  ( 25 Nov 2022 15:12 )  WBC Count : 19.56 K/uL  RBC Count : 3.98 M/uL  Hemoglobin : 11.7 g/dL  Hematocrit : 36.3 %  Platelet Count - Automated : 491 K/uL  Mean Cell Volume : 91.2 fl  Mean Cell Hemoglobin : 29.4 pg  Mean Cell Hemoglobin Concentration : 32.2 gm/dL  Auto Neutrophil # : 13.50 K/uL  Auto Lymphocyte # : 3.46 K/uL  Auto Monocyte # : 2.43 K/uL  Auto Eosinophil # : 0.18 K/uL  Auto Basophil # : 0.00 K/uL  Auto Neutrophil % : 69.0 %  Auto Lymphocyte % : 17.7 %  Auto Monocyte % : 12.4 %  Auto Eosinophil % : 0.9 %  Auto Basophil % : 0.0 %    11-25    139  |  102  |  7   ----------------------------<  98  5.1   |  26  |  0.58    Ca    9.7      25 Nov 2022 15:12    TPro  7.8  /  Alb  3.5  /  TBili  0.3  /  DBili  x   /  AST  22  /  ALT  18  /  AlkPhos  112  11-25    PT/INR - ( 25 Nov 2022 15:28 )   PT: 13.8 sec;   INR: 1.16          PTT - ( 25 Nov 2022 15:28 )  PTT:29.2 sec                Culture Results:   No growth at 12 hours (11-25 @ 16:45)  Culture Results:   No growth at 12 hours (11-25 @ 16:45)      RADIOLOGY & ADDITIONAL STUDIES (The following images were personally reviewed):  Hopkins:                                     No  Urine output:                       adequate  DVT prophylaxis:                 Yes  Flattus:                                  Yes  Bowel movement:              No

## 2022-11-26 NOTE — DIETITIAN INITIAL EVALUATION ADULT - NSPROEDAABILITYLEARN_GEN_A_NUR
----- Message from Jojo Naranjo 
Left mess that [facility] lab is due to recheck cholesterol. Updated remind me.  
none

## 2022-11-26 NOTE — DIETITIAN INITIAL EVALUATION ADULT - PERTINENT MEDS FT
MEDICATIONS  (STANDING):  budesonide 160 MICROgram(s)/formoterol 4.5 MICROgram(s) Inhaler 2 Puff(s) Inhalation two times a day  lactated ringers. 1000 milliLiter(s) (120 mL/Hr) IV Continuous <Continuous>  piperacillin/tazobactam IVPB.. 3.375 Gram(s) IV Intermittent every 8 hours    MEDICATIONS  (PRN):  acetaminophen     Tablet .. 650 milliGRAM(s) Oral every 6 hours PRN Temp greater or equal to 38C (100.4F), Mild Pain (1 - 3)

## 2022-11-26 NOTE — DIETITIAN INITIAL EVALUATION ADULT - ADD RECOMMEND
Hospitalist paged: 50 beat run of wide complex VT, asymptomatic, VSS. K+ replaced,metoprolol given and amio held due to GI note.     Per MD alert cardiology  
1. NPO, pending diet adv as medically feasible (within 24-48 hrs), rec to regular diet with kosher restrictions when able  2. BM and pain regimen per team  3. Monitor BMP, BG, POCT, renal indices, LFTs, CBC, lytes, replete prn  4. Diet edu prn

## 2022-11-26 NOTE — PROGRESS NOTE ADULT - PROBLEM SELECTOR PLAN 6
CT chest/abdomen on 11/23 at outside hospital with colonic diverticulosis. Pt asymptomatic currently.     - F/u outpt

## 2022-11-26 NOTE — DIETITIAN INITIAL EVALUATION ADULT - PERTINENT LABORATORY DATA
11-26    137  |  102  |  6<L>  ----------------------------<  120<H>  4.7   |  27  |  0.71    Ca    8.8      26 Nov 2022 08:14  Phos  3.3     11-26  Mg     2.0     11-26    TPro  6.6  /  Alb  3.1<L>  /  TBili  0.4  /  DBili  x   /  AST  19  /  ALT  22  /  AlkPhos  108  11-26

## 2022-11-26 NOTE — PROGRESS NOTE ADULT - PROBLEM SELECTOR PLAN 1
Pt met 3/4 SIRS criteria on admission: HR 95, RR 22, WBC 19k. Pt with low-grade temperatures (Tmax 99F), acute on chronic cough, CP, dyspnea, and weakness x1 week. CXR with L pleural effusion. CT chest/abdomen at outside hospital on 11/23 with L pleural effusion and probable partially cystic mass at L lung base. Sx likely secondary to empyema. s/p zosyn 3.375 x1 in ED.     Plan:   - c/w zosyn 3.375g q6h  - LR @ 120cc/hr x10h   - F/u BCx  - F/u urine Strep, Legionella  - F/u MRSA swab, RVP   - Pulm consult in AM  - NPO at midnight and hold AC; plan for possible bronchoscopy and/or thoracentesis in AM Pt met 3/4 SIRS criteria on admission: HR 95, RR 22, WBC 19k. Pt with low-grade temperatures (Tmax 99F), acute on chronic cough, CP, dyspnea, and weakness x1 week. CXR with L pleural effusion. CT chest/abdomen at outside hospital on 11/23 with L pleural effusion and probable partially cystic mass at L lung base. Sx likely secondary to empyema. s/p zosyn 3.375 x1 in ED.     Plan:   - c/w zosyn 3.375g q6h  - LR @ 120cc/hr x10h   - BCx NGTD, continue to follow  - F/u urine Strep, Legionella  - F/u MRSA swab, RVP   - per Dr. Delgado, bronch will be next week  - NPO at midnight and hold AC; plan for possible bronchoscopy and/or thoracentesis in AM Pt met 3/4 SIRS criteria on admission: HR 95, RR 22, WBC 19k. Pt with low-grade temperatures (Tmax 99F), acute on chronic cough, CP, dyspnea, and weakness x1 week. CXR with L pleural effusion. CT chest/abdomen at outside hospital on 11/23 with L pleural effusion and probable partially cystic mass at L lung base. Sx likely secondary to empyema. s/p zosyn 3.375 x1 in ED.     Plan:   - per Dr. Delgado, bronch will be next week  - c/w zosyn 3.375g q6h  - LR @ 120cc/hr x10h   - BCx NGTD, continue to follow  - F/u urine Strep, Legionella  - F/u MRSA swab, RVP Pt met 3/4 SIRS criteria on admission: HR 95, RR 22, WBC 19k. Pt with low-grade temperatures (Tmax 99F), acute on chronic cough, CP, dyspnea, and weakness x1 week. CXR with L pleural effusion. CT chest/abdomen at outside hospital on 11/23 with L pleural effusion and probable partially cystic mass at L lung base. Sx likely secondary to empyema. s/p zosyn 3.375 x1 in ED.     Plan:   - per Dr. Delgado, bronch will be next week  - c/w zosyn 3.375g q6h  -started robitussin 100mg q8 for cough  - LR @ 120cc/hr x10h   - BCx NGTD, continue to follow  - F/u urine Strep, Legionella  - F/u MRSA swab, RVP

## 2022-11-26 NOTE — DIETITIAN INITIAL EVALUATION ADULT - OTHER INFO
61 y/o F with PMHx of Crohn's, diverticulosis, asthma, and benign breast cyst (in 1989) presented with acute on chronic cough, weakness, dyspnea, and chest pain x 1 week, CXR with L pleural effusion, found to meet SIRS criteria, likely 2/2 empyema and L pleural effusion.    Pt seen resting in bed,  by pt bedside. Remains NPO at this time, with pending plan for possible bronch/thoracentesis today. She reports UBW of 186 lbs, CBW of 191 lbs, +5 lbs, likely r/t pleural effusion. Denies changes in appetite and PO intake, however endorses decreased appetite for 1 week PTA. Follows kosher restrictions, NKFA. Discussed possible plan for diet once able to adv. Pt understanding. At this time pain well controlled, but initially had L abd pain, and back pain per pt report. No n/v/d reported. No BM yet, constipated per flowsheet. Skin: Terrell 19, edema 1+ R and L hand. No PU. RD to continue to follow.

## 2022-11-27 LAB
ANION GAP SERPL CALC-SCNC: 9 MMOL/L — SIGNIFICANT CHANGE UP (ref 5–17)
BASOPHILS # BLD AUTO: 0.06 K/UL — SIGNIFICANT CHANGE UP (ref 0–0.2)
BASOPHILS NFR BLD AUTO: 0.3 % — SIGNIFICANT CHANGE UP (ref 0–2)
BUN SERPL-MCNC: 6 MG/DL — LOW (ref 7–23)
CALCIUM SERPL-MCNC: 9.2 MG/DL — SIGNIFICANT CHANGE UP (ref 8.4–10.5)
CHLORIDE SERPL-SCNC: 100 MMOL/L — SIGNIFICANT CHANGE UP (ref 96–108)
CO2 SERPL-SCNC: 28 MMOL/L — SIGNIFICANT CHANGE UP (ref 22–31)
CREAT SERPL-MCNC: 0.69 MG/DL — SIGNIFICANT CHANGE UP (ref 0.5–1.3)
EGFR: 99 ML/MIN/1.73M2 — SIGNIFICANT CHANGE UP
EOSINOPHIL # BLD AUTO: 0.24 K/UL — SIGNIFICANT CHANGE UP (ref 0–0.5)
EOSINOPHIL NFR BLD AUTO: 1.3 % — SIGNIFICANT CHANGE UP (ref 0–6)
GLUCOSE SERPL-MCNC: 114 MG/DL — HIGH (ref 70–99)
HCT VFR BLD CALC: 35.1 % — SIGNIFICANT CHANGE UP (ref 34.5–45)
HGB BLD-MCNC: 11.1 G/DL — LOW (ref 11.5–15.5)
IMM GRANULOCYTES NFR BLD AUTO: 1 % — HIGH (ref 0–0.9)
LEGIONELLA AG UR QL: NEGATIVE — SIGNIFICANT CHANGE UP
LYMPHOCYTES # BLD AUTO: 19.7 % — SIGNIFICANT CHANGE UP (ref 13–44)
LYMPHOCYTES # BLD AUTO: 3.69 K/UL — HIGH (ref 1–3.3)
MAGNESIUM SERPL-MCNC: 2.2 MG/DL — SIGNIFICANT CHANGE UP (ref 1.6–2.6)
MCHC RBC-ENTMCNC: 29.2 PG — SIGNIFICANT CHANGE UP (ref 27–34)
MCHC RBC-ENTMCNC: 31.6 GM/DL — LOW (ref 32–36)
MCV RBC AUTO: 92.4 FL — SIGNIFICANT CHANGE UP (ref 80–100)
MONOCYTES # BLD AUTO: 2.12 K/UL — HIGH (ref 0–0.9)
MONOCYTES NFR BLD AUTO: 11.3 % — SIGNIFICANT CHANGE UP (ref 2–14)
NEUTROPHILS # BLD AUTO: 12.46 K/UL — HIGH (ref 1.8–7.4)
NEUTROPHILS NFR BLD AUTO: 66.4 % — SIGNIFICANT CHANGE UP (ref 43–77)
NRBC # BLD: 0 /100 WBCS — SIGNIFICANT CHANGE UP (ref 0–0)
PHOSPHATE SERPL-MCNC: 3.4 MG/DL — SIGNIFICANT CHANGE UP (ref 2.5–4.5)
PLATELET # BLD AUTO: 595 K/UL — HIGH (ref 150–400)
POTASSIUM SERPL-MCNC: 4.1 MMOL/L — SIGNIFICANT CHANGE UP (ref 3.5–5.3)
POTASSIUM SERPL-SCNC: 4.1 MMOL/L — SIGNIFICANT CHANGE UP (ref 3.5–5.3)
RBC # BLD: 3.8 M/UL — SIGNIFICANT CHANGE UP (ref 3.8–5.2)
RBC # FLD: 13.3 % — SIGNIFICANT CHANGE UP (ref 10.3–14.5)
SODIUM SERPL-SCNC: 137 MMOL/L — SIGNIFICANT CHANGE UP (ref 135–145)
WBC # BLD: 18.75 K/UL — HIGH (ref 3.8–10.5)
WBC # FLD AUTO: 18.75 K/UL — HIGH (ref 3.8–10.5)

## 2022-11-27 PROCEDURE — 99232 SBSQ HOSP IP/OBS MODERATE 35: CPT

## 2022-11-27 RX ORDER — LANOLIN ALCOHOL/MO/W.PET/CERES
3 CREAM (GRAM) TOPICAL AT BEDTIME
Refills: 0 | Status: DISCONTINUED | OUTPATIENT
Start: 2022-11-27 | End: 2022-12-09

## 2022-11-27 RX ADMIN — BUDESONIDE AND FORMOTEROL FUMARATE DIHYDRATE 2 PUFF(S): 160; 4.5 AEROSOL RESPIRATORY (INHALATION) at 06:41

## 2022-11-27 RX ADMIN — PIPERACILLIN AND TAZOBACTAM 25 GRAM(S): 4; .5 INJECTION, POWDER, LYOPHILIZED, FOR SOLUTION INTRAVENOUS at 06:41

## 2022-11-27 RX ADMIN — Medication 3 MILLIGRAM(S): at 22:57

## 2022-11-27 RX ADMIN — PIPERACILLIN AND TAZOBACTAM 25 GRAM(S): 4; .5 INJECTION, POWDER, LYOPHILIZED, FOR SOLUTION INTRAVENOUS at 23:36

## 2022-11-27 RX ADMIN — BUDESONIDE AND FORMOTEROL FUMARATE DIHYDRATE 2 PUFF(S): 160; 4.5 AEROSOL RESPIRATORY (INHALATION) at 17:32

## 2022-11-27 RX ADMIN — PIPERACILLIN AND TAZOBACTAM 25 GRAM(S): 4; .5 INJECTION, POWDER, LYOPHILIZED, FOR SOLUTION INTRAVENOUS at 14:54

## 2022-11-27 NOTE — PROGRESS NOTE ADULT - ASSESSMENT
63 y/o F with PMHx of Crohn's, diverticulosis, asthma, and benign breast cyst (in 1989) presented with acute on chronic cough, weakness, dyspnea, and chest pain x 1 week, CXR with L pleural effusion, found to meet SIRS criteria, likely 2/2 empyema and L pleural effusion.

## 2022-11-27 NOTE — PROGRESS NOTE ADULT - PROBLEM SELECTOR PLAN 7
Plan:  F: LR @120cc/hr x10hr  E: replete K<4, Mg<2  N: kosher diet  VTE: none  GI: none  C: Full Code  D: 7Wo

## 2022-11-27 NOTE — PROGRESS NOTE ADULT - PROBLEM SELECTOR PLAN 1
Pt met 3/4 SIRS criteria on admission: HR 95, RR 22, WBC 19k. Pt with low-grade temperatures (Tmax 99F), acute on chronic cough, CP, dyspnea, and weakness x1 week. CXR with L pleural effusion. CT chest/abdomen at outside hospital on 11/23 with L pleural effusion and probable partially cystic mass at L lung base. Sx likely secondary to empyema. s/p zosyn 3.375 x1 in ED.     Plan:   - per Dr. Delgado, bronch will be next week  - c/w zosyn 3.375g q6h  -started robitussin 100mg q8 for cough  - LR @ 120cc/hr x10h   - BCx NGTD, continue to follow  - F/u urine Strep, Legionella  - F/u MRSA swab, RVP

## 2022-11-27 NOTE — PROGRESS NOTE ADULT - SUBJECTIVE AND OBJECTIVE BOX
Interval Events: Reviewed  Patient seen and examined at bedside.    Patient is a 62y old  Female who presents with a chief complaint of PLEURAL EFFUSION   (26 Nov 2022 10:56)  no acute event overnight, 2L NC 95%, coughing overnight      PAST MEDICAL & SURGICAL HISTORY:  Prediabetes      Diverticulosis      Crohn&#x27;s disease      History of cholecystectomy      S/P parathyroidectomy      Benign cyst of breast          MEDICATIONS:  Pulmonary:  budesonide 160 MICROgram(s)/formoterol 4.5 MICROgram(s) Inhaler 2 Puff(s) Inhalation two times a day  guaiFENesin Oral Liquid (Sugar-Free) 100 milliGRAM(s) Oral every 8 hours PRN    Antimicrobials:  piperacillin/tazobactam IVPB.. 3.375 Gram(s) IV Intermittent every 8 hours    Anticoagulants:    Cardiac:      Allergies    tetracyclines (Swelling)    Intolerances        Vital Signs Last 24 Hrs  T(C): 37.3 (27 Nov 2022 04:42), Max: 37.3 (27 Nov 2022 04:42)  T(F): 99.2 (27 Nov 2022 04:42), Max: 99.2 (27 Nov 2022 04:42)  HR: 82 (27 Nov 2022 04:42) (81 - 89)  BP: 109/69 (27 Nov 2022 04:42) (95/60 - 109/69)  BP(mean): --  RR: 18 (27 Nov 2022 04:42) (18 - 20)  SpO2: 95% (27 Nov 2022 04:42) (95% - 97%)    Parameters below as of 27 Nov 2022 04:42  Patient On (Oxygen Delivery Method): nasal cannula  O2 Flow (L/min): 2          Review of Systems:   •	General: negative  •	Skin/Breast: negative  •	Ophthalmologic: negative  •	ENMT: negative  •	Respiratory and Thorax: negative  •	Cardiovascular: negative  •	Gastrointestinal: negative  •	Genitourinary: negative  •	Musculoskeletal: negative  •	Neurological: negative  •	Psychiatric: negative  •	Hematology/Lymphatics: negative  •	Endocrine: negative  •	Allergic/Immunologic: negative    Physical Exam:   • Constitutional:	NAD  • Eyes:	EOMI; PERRL; no drainage or redness  • ENMT:	No oral lesions; no gross abnormalities  • Neck	no thyromegaly or nodules  • Breasts:	not examined  • Back:	No deformity or limitation of movement  • Respiratory:	Breath Sounds equal & clear to auscultation, no accessory muscle use, decreased breath sounds on left lung   • Cardiovascular:	Regular rate & rhythm, normal S1, S2; no murmurs, gallops or rubs; no S3, S4  • Gastrointestinal:	Soft, non-tender, no hepatosplenomegaly, normal bowel sounds  • Genitourinary:	not examined  • Rectal: not examined  • Extremities:	No cyanosis, clubbing or edema  • Vascular:	Equal and normal pulses ( dorsalis pedis)  • Neurologica:l	not examined  • Skin:	No lesions; no rash  • Lymph Nodes:	No lymphadedenopathy  • Musculoskeletal:	No joint pain, swelling or deformity; no limitation of movement        LABS:      CBC Full  -  ( 26 Nov 2022 08:14 )  WBC Count : 16.75 K/uL  RBC Count : 3.58 M/uL  Hemoglobin : 10.4 g/dL  Hematocrit : 33.0 %  Platelet Count - Automated : 558 K/uL  Mean Cell Volume : 92.2 fl  Mean Cell Hemoglobin : 29.1 pg  Mean Cell Hemoglobin Concentration : 31.5 gm/dL  Auto Neutrophil # : 11.67 K/uL  Auto Lymphocyte # : 2.79 K/uL  Auto Monocyte # : 2.00 K/uL  Auto Eosinophil # : 0.15 K/uL  Auto Basophil # : 0.04 K/uL  Auto Neutrophil % : 69.7 %  Auto Lymphocyte % : 16.7 %  Auto Monocyte % : 11.9 %  Auto Eosinophil % : 0.9 %  Auto Basophil % : 0.2 %    11-26    137  |  102  |  6<L>  ----------------------------<  120<H>  4.7   |  27  |  0.71    Ca    8.8      26 Nov 2022 08:14  Phos  3.3     11-26  Mg     2.0     11-26    TPro  6.6  /  Alb  3.1<L>  /  TBili  0.4  /  DBili  x   /  AST  19  /  ALT  22  /  AlkPhos  108  11-26    PT/INR - ( 26 Nov 2022 08:14 )   PT: 16.5 sec;   INR: 1.38          PTT - ( 26 Nov 2022 08:14 )  PTT:29.4 sec                Culture Results:   No growth at 1 day. (11-25 @ 16:45)  Culture Results:   No growth at 1 day. (11-25 @ 16:45)      RADIOLOGY & ADDITIONAL STUDIES (The following images were personally reviewed):  Hopkins:                                     No  Urine output:                       adequate  DVT prophylaxis:                 Yes  Flattus:                                  Yes  Bowel movement:              No

## 2022-11-27 NOTE — PROGRESS NOTE ADULT - PROBLEM SELECTOR PLAN 2
Pt with low-grade temperatures (Tmax 99F), acute on chronic cough, CP, dyspnea, and weakness x1 week. CXR with L pleural effusion. CT chest/abdomen at outside hospital on 11/23 with L pleural effusion and probable partially cystic mass at L lung base. Was diagnosed with empyema at outside hospital and sent home with Augmentin and azithromycin.  Tmax 100.3 F, IV tylenol given, 11/26  afebrile overnight    - see "SIRS" plan

## 2022-11-28 ENCOUNTER — RESULT REVIEW (OUTPATIENT)
Age: 62
End: 2022-11-28

## 2022-11-28 LAB
ALBUMIN FLD-MCNC: 2.8 G/DL — SIGNIFICANT CHANGE UP
ALBUMIN SERPL ELPH-MCNC: 3 G/DL — LOW (ref 3.3–5)
ALP SERPL-CCNC: 130 U/L — HIGH (ref 40–120)
ALT FLD-CCNC: 38 U/L — SIGNIFICANT CHANGE UP (ref 10–45)
ANION GAP SERPL CALC-SCNC: 10 MMOL/L — SIGNIFICANT CHANGE UP (ref 5–17)
APTT BLD: 29.4 SEC — SIGNIFICANT CHANGE UP (ref 27.5–35.5)
AST SERPL-CCNC: 24 U/L — SIGNIFICANT CHANGE UP (ref 10–40)
B PERT IGG+IGM PNL SER: SIGNIFICANT CHANGE UP
BASOPHILS # BLD AUTO: 0.06 K/UL — SIGNIFICANT CHANGE UP (ref 0–0.2)
BASOPHILS NFR BLD AUTO: 0.4 % — SIGNIFICANT CHANGE UP (ref 0–2)
BILIRUB SERPL-MCNC: 0.3 MG/DL — SIGNIFICANT CHANGE UP (ref 0.2–1.2)
BUN SERPL-MCNC: 6 MG/DL — LOW (ref 7–23)
CALCIUM SERPL-MCNC: 8.8 MG/DL — SIGNIFICANT CHANGE UP (ref 8.4–10.5)
CHLORIDE SERPL-SCNC: 102 MMOL/L — SIGNIFICANT CHANGE UP (ref 96–108)
CO2 SERPL-SCNC: 27 MMOL/L — SIGNIFICANT CHANGE UP (ref 22–31)
COLOR FLD: YELLOW — SIGNIFICANT CHANGE UP
CREAT SERPL-MCNC: 0.67 MG/DL — SIGNIFICANT CHANGE UP (ref 0.5–1.3)
EGFR: 100 ML/MIN/1.73M2 — SIGNIFICANT CHANGE UP
EOSINOPHIL # BLD AUTO: 0.19 K/UL — SIGNIFICANT CHANGE UP (ref 0–0.5)
EOSINOPHIL NFR BLD AUTO: 1.2 % — SIGNIFICANT CHANGE UP (ref 0–6)
FLUID INTAKE SUBSTANCE CLASS: SIGNIFICANT CHANGE UP
GLUCOSE FLD-MCNC: 58 MG/DL — SIGNIFICANT CHANGE UP
GLUCOSE SERPL-MCNC: 113 MG/DL — HIGH (ref 70–99)
GRAM STN FLD: SIGNIFICANT CHANGE UP
HCT VFR BLD CALC: 34.5 % — SIGNIFICANT CHANGE UP (ref 34.5–45)
HGB BLD-MCNC: 10.8 G/DL — LOW (ref 11.5–15.5)
IMM GRANULOCYTES NFR BLD AUTO: 1.1 % — HIGH (ref 0–0.9)
INR BLD: 1.34 — HIGH (ref 0.88–1.16)
LDH SERPL L TO P-CCNC: 614 U/L — SIGNIFICANT CHANGE UP
LYMPHOCYTES # BLD AUTO: 22.6 % — SIGNIFICANT CHANGE UP (ref 13–44)
LYMPHOCYTES # BLD AUTO: 3.54 K/UL — HIGH (ref 1–3.3)
MAGNESIUM SERPL-MCNC: 2.5 MG/DL — SIGNIFICANT CHANGE UP (ref 1.6–2.6)
MCHC RBC-ENTMCNC: 28.6 PG — SIGNIFICANT CHANGE UP (ref 27–34)
MCHC RBC-ENTMCNC: 31.3 GM/DL — LOW (ref 32–36)
MCV RBC AUTO: 91.5 FL — SIGNIFICANT CHANGE UP (ref 80–100)
MONOCYTES # BLD AUTO: 2.08 K/UL — HIGH (ref 0–0.9)
MONOCYTES NFR BLD AUTO: 13.3 % — SIGNIFICANT CHANGE UP (ref 2–14)
NEUTROPHILS # BLD AUTO: 9.6 K/UL — HIGH (ref 1.8–7.4)
NEUTROPHILS NFR BLD AUTO: 61.4 % — SIGNIFICANT CHANGE UP (ref 43–77)
NRBC # BLD: 0 /100 WBCS — SIGNIFICANT CHANGE UP (ref 0–0)
PH, PLEURAL FLUID: 7.55 — SIGNIFICANT CHANGE UP
PHOSPHATE SERPL-MCNC: 3.3 MG/DL — SIGNIFICANT CHANGE UP (ref 2.5–4.5)
PLATELET # BLD AUTO: 530 K/UL — HIGH (ref 150–400)
POTASSIUM SERPL-MCNC: 4.4 MMOL/L — SIGNIFICANT CHANGE UP (ref 3.5–5.3)
POTASSIUM SERPL-SCNC: 4.4 MMOL/L — SIGNIFICANT CHANGE UP (ref 3.5–5.3)
PROT FLD-MCNC: 5.2 G/DL — SIGNIFICANT CHANGE UP
PROT SERPL-MCNC: 6.9 G/DL — SIGNIFICANT CHANGE UP (ref 6–8.3)
PROTHROM AB SERPL-ACNC: 16 SEC — HIGH (ref 10.5–13.4)
RBC # BLD: 3.77 M/UL — LOW (ref 3.8–5.2)
RBC # FLD: 13.2 % — SIGNIFICANT CHANGE UP (ref 10.3–14.5)
RCV VOL RI: 2000 /UL — HIGH (ref 0–0)
SODIUM SERPL-SCNC: 139 MMOL/L — SIGNIFICANT CHANGE UP (ref 135–145)
SPECIMEN SOURCE FLD: SIGNIFICANT CHANGE UP
SPECIMEN SOURCE: SIGNIFICANT CHANGE UP
TOTAL NUCLEATED CELL COUNT, BODY FLUID: 711 /UL — SIGNIFICANT CHANGE UP
TUBE TYPE: SIGNIFICANT CHANGE UP
WBC # BLD: 15.64 K/UL — HIGH (ref 3.8–10.5)
WBC # FLD AUTO: 15.64 K/UL — HIGH (ref 3.8–10.5)

## 2022-11-28 PROCEDURE — 88305 TISSUE EXAM BY PATHOLOGIST: CPT | Mod: 26

## 2022-11-28 PROCEDURE — 32551 INSERTION OF CHEST TUBE: CPT | Mod: GC

## 2022-11-28 PROCEDURE — 88112 CYTOPATH CELL ENHANCE TECH: CPT | Mod: 26

## 2022-11-28 PROCEDURE — 71045 X-RAY EXAM CHEST 1 VIEW: CPT | Mod: 26

## 2022-11-28 PROCEDURE — 99233 SBSQ HOSP IP/OBS HIGH 50: CPT | Mod: GC

## 2022-11-28 RX ORDER — TRAMADOL HYDROCHLORIDE 50 MG/1
25 TABLET ORAL ONCE
Refills: 0 | Status: DISCONTINUED | OUTPATIENT
Start: 2022-11-28 | End: 2022-11-28

## 2022-11-28 RX ORDER — MORPHINE SULFATE 50 MG/1
0.5 CAPSULE, EXTENDED RELEASE ORAL ONCE
Refills: 0 | Status: DISCONTINUED | OUTPATIENT
Start: 2022-11-28 | End: 2022-11-28

## 2022-11-28 RX ORDER — LIDOCAINE 4 G/100G
1 CREAM TOPICAL ONCE
Refills: 0 | Status: COMPLETED | OUTPATIENT
Start: 2022-11-28 | End: 2022-11-28

## 2022-11-28 RX ADMIN — Medication 650 MILLIGRAM(S): at 11:49

## 2022-11-28 RX ADMIN — PIPERACILLIN AND TAZOBACTAM 25 GRAM(S): 4; .5 INJECTION, POWDER, LYOPHILIZED, FOR SOLUTION INTRAVENOUS at 23:05

## 2022-11-28 RX ADMIN — PIPERACILLIN AND TAZOBACTAM 25 GRAM(S): 4; .5 INJECTION, POWDER, LYOPHILIZED, FOR SOLUTION INTRAVENOUS at 07:48

## 2022-11-28 RX ADMIN — TRAMADOL HYDROCHLORIDE 25 MILLIGRAM(S): 50 TABLET ORAL at 22:41

## 2022-11-28 RX ADMIN — LIDOCAINE 1 PATCH: 4 CREAM TOPICAL at 23:05

## 2022-11-28 RX ADMIN — MORPHINE SULFATE 0.5 MILLIGRAM(S): 50 CAPSULE, EXTENDED RELEASE ORAL at 19:34

## 2022-11-28 RX ADMIN — PIPERACILLIN AND TAZOBACTAM 25 GRAM(S): 4; .5 INJECTION, POWDER, LYOPHILIZED, FOR SOLUTION INTRAVENOUS at 14:11

## 2022-11-28 RX ADMIN — MORPHINE SULFATE 0.5 MILLIGRAM(S): 50 CAPSULE, EXTENDED RELEASE ORAL at 19:58

## 2022-11-28 RX ADMIN — TRAMADOL HYDROCHLORIDE 25 MILLIGRAM(S): 50 TABLET ORAL at 21:58

## 2022-11-28 RX ADMIN — Medication 650 MILLIGRAM(S): at 11:12

## 2022-11-28 RX ADMIN — BUDESONIDE AND FORMOTEROL FUMARATE DIHYDRATE 2 PUFF(S): 160; 4.5 AEROSOL RESPIRATORY (INHALATION) at 17:07

## 2022-11-28 RX ADMIN — BUDESONIDE AND FORMOTEROL FUMARATE DIHYDRATE 2 PUFF(S): 160; 4.5 AEROSOL RESPIRATORY (INHALATION) at 06:39

## 2022-11-28 NOTE — PROGRESS NOTE ADULT - TIME BILLING
Patient seen and examined with house-staff during bedside rounds.  Resident note read, including vitals, physical findings, laboratory data, and radiological reports.   Revisions included below.  Direct personal management at bed side and extensive interpretation of the data.  Plan was outlined and discussed in details with the housestaff.  Decision making of high complexity  Action taken for acute disease activity to reflect the level of care provided:  - medication reconciliation  - review laboratory data  I reviewed the CT scan.  The picture consistent with left upper lobe pneumonia with loculated pleural effusion.  Ultrasound of the chest revealed loculated effusion.  The chest tube was inserted.  The pleural fluid is consistent with exudative.  Gram stain is negative today.  Continue antibiotic.  Bronchoscopy tomorrow.  tPA dornase and will repeat the CT scan of the chest.  Patient out of bed in chair.  Continue Zosyn.  White count is slowly decreasing

## 2022-11-28 NOTE — PROGRESS NOTE ADULT - PROBLEM SELECTOR PLAN 1
Pt met 3/4 SIRS criteria on admission: HR 95, RR 22, WBC 19k. Pt with low-grade temperatures (Tmax 99F), acute on chronic cough, CP, dyspnea, and weakness x1 week. CXR with L pleural effusion. CT chest/abdomen at outside hospital on 11/23 with L pleural effusion and probable partially cystic mass at L lung base. Sx likely secondary to empyema. s/p zosyn 3.375 x1 in ED.   Ur legionella neg    Plan:   - per Dr. Delgado, bronch 11/29  - c/w zosyn 3.375g q6h  -started robitussin 100mg q8 for cough  - BCx NGTD  - F/u urine Strep  - F/u MRSA swab, RVP

## 2022-11-28 NOTE — PROGRESS NOTE ADULT - SUBJECTIVE AND OBJECTIVE BOX
OVERNIGHT EVENTS: NAEO    SUBJECTIVE / INTERVAL HPI: Patient seen and examined at bedside. Discussed chest tube placement with pt & pulm fellow, with  at bedside. Consented for procedure. No chest pain, fever, chills.   VITAL SIGNS:  Vital Signs Last 24 Hrs  T(C): 37.4 (28 Nov 2022 14:38), Max: 38 (28 Nov 2022 11:07)  T(F): 99.3 (28 Nov 2022 14:38), Max: 100.4 (28 Nov 2022 11:07)  HR: 88 (28 Nov 2022 14:38) (86 - 94)  BP: 104/65 (28 Nov 2022 14:38) (101/62 - 107/66)  BP(mean): --  RR: 20 (28 Nov 2022 14:38) (18 - 20)  SpO2: 94% (28 Nov 2022 14:38) (93% - 96%)    Parameters below as of 28 Nov 2022 14:38  Patient On (Oxygen Delivery Method): nasal cannula w/ humidification  O2 Flow (L/min): 3      PHYSICAL EXAM:    General: NAD, lying in bed comfortably with  at bedside  HEENT: NCAT; PERRL, anicteric sclera; MMM  Neck: supple, trachea midline  Cardiovascular: S1, S2 normal; RRR, no M/G/R  Respiratory: decreased breath sounds L lung,  Gastrointestinal: soft, nontender, nondistended. bowel sounds present.  Skin: no ulcerations or visible rashes appreciated  Extremities: WWP; no edema, clubbing or cyanosis  Vascular: 2+ DP pulses  Neurological: AAOx3; CN II-XII grossly intact; no focal deficits      MEDICATIONS:  MEDICATIONS  (STANDING):  budesonide 160 MICROgram(s)/formoterol 4.5 MICROgram(s) Inhaler 2 Puff(s) Inhalation two times a day  piperacillin/tazobactam IVPB.. 3.375 Gram(s) IV Intermittent every 8 hours    MEDICATIONS  (PRN):  acetaminophen     Tablet .. 650 milliGRAM(s) Oral every 6 hours PRN Temp greater or equal to 38C (100.4F), Mild Pain (1 - 3)  guaiFENesin Oral Liquid (Sugar-Free) 100 milliGRAM(s) Oral every 8 hours PRN Cough  melatonin 3 milliGRAM(s) Oral at bedtime PRN Insomnia      ALLERGIES:  Allergies    tetracyclines (Swelling)    Intolerances        LABS:                        10.8   15.64 )-----------( 530      ( 28 Nov 2022 05:30 )             34.5     11-28    139  |  102  |  6<L>  ----------------------------<  113<H>  4.4   |  27  |  0.67    Ca    8.8      28 Nov 2022 05:30  Phos  3.3     11-28  Mg     2.5     11-28    TPro  6.9  /  Alb  3.0<L>  /  TBili  0.3  /  DBili  x   /  AST  24  /  ALT  38  /  AlkPhos  130<H>  11-28    PT/INR - ( 28 Nov 2022 05:30 )   PT: 16.0 sec;   INR: 1.34          PTT - ( 28 Nov 2022 05:30 )  PTT:29.4 sec    CAPILLARY BLOOD GLUCOSE          RADIOLOGY & ADDITIONAL TESTS: Reviewed.

## 2022-11-29 LAB
ALBUMIN SERPL ELPH-MCNC: 2.9 G/DL — LOW (ref 3.3–5)
ALP SERPL-CCNC: 138 U/L — HIGH (ref 40–120)
ALT FLD-CCNC: 50 U/L — HIGH (ref 10–45)
ANION GAP SERPL CALC-SCNC: 9 MMOL/L — SIGNIFICANT CHANGE UP (ref 5–17)
APTT BLD: 30 SEC — SIGNIFICANT CHANGE UP (ref 27.5–35.5)
AST SERPL-CCNC: 28 U/L — SIGNIFICANT CHANGE UP (ref 10–40)
BILIRUB SERPL-MCNC: 0.2 MG/DL — SIGNIFICANT CHANGE UP (ref 0.2–1.2)
BLD GP AB SCN SERPL QL: NEGATIVE — SIGNIFICANT CHANGE UP
BUN SERPL-MCNC: 7 MG/DL — SIGNIFICANT CHANGE UP (ref 7–23)
CALCIUM SERPL-MCNC: 9 MG/DL — SIGNIFICANT CHANGE UP (ref 8.4–10.5)
CHLORIDE SERPL-SCNC: 99 MMOL/L — SIGNIFICANT CHANGE UP (ref 96–108)
CHOLEST FLD-MCNC: 91 MG/DL — SIGNIFICANT CHANGE UP
CO2 SERPL-SCNC: 26 MMOL/L — SIGNIFICANT CHANGE UP (ref 22–31)
CREAT SERPL-MCNC: 0.68 MG/DL — SIGNIFICANT CHANGE UP (ref 0.5–1.3)
EGFR: 100 ML/MIN/1.73M2 — SIGNIFICANT CHANGE UP
GLUCOSE SERPL-MCNC: 141 MG/DL — HIGH (ref 70–99)
HCT VFR BLD CALC: 32 % — LOW (ref 34.5–45)
HGB BLD-MCNC: 10.2 G/DL — LOW (ref 11.5–15.5)
INR BLD: 1.39 — HIGH (ref 0.88–1.16)
LDH SERPL L TO P-CCNC: 260 U/L — HIGH (ref 50–242)
LYMPHOCYTES # FLD: 41 % — SIGNIFICANT CHANGE UP
MAGNESIUM SERPL-MCNC: 2.2 MG/DL — SIGNIFICANT CHANGE UP (ref 1.6–2.6)
MCHC RBC-ENTMCNC: 28.7 PG — SIGNIFICANT CHANGE UP (ref 27–34)
MCHC RBC-ENTMCNC: 31.9 GM/DL — LOW (ref 32–36)
MCV RBC AUTO: 90.1 FL — SIGNIFICANT CHANGE UP (ref 80–100)
NEUTROPHILS-BODY FLUID: 59 % — SIGNIFICANT CHANGE UP
NIGHT BLUE STAIN TISS: SIGNIFICANT CHANGE UP
NRBC # BLD: 0 /100 WBCS — SIGNIFICANT CHANGE UP (ref 0–0)
PHOSPHATE SERPL-MCNC: 2.9 MG/DL — SIGNIFICANT CHANGE UP (ref 2.5–4.5)
PLATELET # BLD AUTO: 611 K/UL — HIGH (ref 150–400)
POTASSIUM SERPL-MCNC: 4.2 MMOL/L — SIGNIFICANT CHANGE UP (ref 3.5–5.3)
POTASSIUM SERPL-SCNC: 4.2 MMOL/L — SIGNIFICANT CHANGE UP (ref 3.5–5.3)
PROT SERPL-MCNC: 6.8 G/DL — SIGNIFICANT CHANGE UP (ref 6–8.3)
PROTHROM AB SERPL-ACNC: 16.6 SEC — HIGH (ref 10.5–13.4)
RBC # BLD: 3.55 M/UL — LOW (ref 3.8–5.2)
RBC # FLD: 13.1 % — SIGNIFICANT CHANGE UP (ref 10.3–14.5)
RH IG SCN BLD-IMP: POSITIVE — SIGNIFICANT CHANGE UP
SARS-COV-2 RNA SPEC QL NAA+PROBE: SIGNIFICANT CHANGE UP
SODIUM SERPL-SCNC: 134 MMOL/L — LOW (ref 135–145)
SPECIMEN SOURCE: SIGNIFICANT CHANGE UP
WBC # BLD: 14.2 K/UL — HIGH (ref 3.8–10.5)
WBC # FLD AUTO: 14.2 K/UL — HIGH (ref 3.8–10.5)

## 2022-11-29 PROCEDURE — 99233 SBSQ HOSP IP/OBS HIGH 50: CPT | Mod: GC

## 2022-11-29 PROCEDURE — 99222 1ST HOSP IP/OBS MODERATE 55: CPT

## 2022-11-29 RX ORDER — ALTEPLASE 100 MG
10 KIT INTRAVENOUS EVERY 12 HOURS
Refills: 0 | Status: COMPLETED | OUTPATIENT
Start: 2022-11-29 | End: 2022-12-02

## 2022-11-29 RX ORDER — DORNASE ALFA 1 MG/ML
5 SOLUTION RESPIRATORY (INHALATION) EVERY 12 HOURS
Refills: 0 | Status: COMPLETED | OUTPATIENT
Start: 2022-11-29 | End: 2022-12-02

## 2022-11-29 RX ORDER — SODIUM CHLORIDE 9 MG/ML
30 INJECTION INTRAMUSCULAR; INTRAVENOUS; SUBCUTANEOUS EVERY 12 HOURS
Refills: 0 | Status: COMPLETED | OUTPATIENT
Start: 2022-11-29 | End: 2022-12-02

## 2022-11-29 RX ADMIN — SODIUM CHLORIDE 30 MILLILITER(S): 9 INJECTION INTRAMUSCULAR; INTRAVENOUS; SUBCUTANEOUS at 14:22

## 2022-11-29 RX ADMIN — Medication 650 MILLIGRAM(S): at 19:39

## 2022-11-29 RX ADMIN — BUDESONIDE AND FORMOTEROL FUMARATE DIHYDRATE 2 PUFF(S): 160; 4.5 AEROSOL RESPIRATORY (INHALATION) at 18:02

## 2022-11-29 RX ADMIN — Medication 62.5 MILLIMOLE(S): at 13:42

## 2022-11-29 RX ADMIN — LIDOCAINE 1 PATCH: 4 CREAM TOPICAL at 11:00

## 2022-11-29 RX ADMIN — BUDESONIDE AND FORMOTEROL FUMARATE DIHYDRATE 2 PUFF(S): 160; 4.5 AEROSOL RESPIRATORY (INHALATION) at 07:22

## 2022-11-29 RX ADMIN — Medication 3 MILLIGRAM(S): at 23:09

## 2022-11-29 RX ADMIN — LIDOCAINE 1 PATCH: 4 CREAM TOPICAL at 05:56

## 2022-11-29 RX ADMIN — ALTEPLASE 10 MILLIGRAM(S): KIT at 14:14

## 2022-11-29 RX ADMIN — PIPERACILLIN AND TAZOBACTAM 25 GRAM(S): 4; .5 INJECTION, POWDER, LYOPHILIZED, FOR SOLUTION INTRAVENOUS at 14:38

## 2022-11-29 RX ADMIN — Medication 650 MILLIGRAM(S): at 19:07

## 2022-11-29 RX ADMIN — PIPERACILLIN AND TAZOBACTAM 25 GRAM(S): 4; .5 INJECTION, POWDER, LYOPHILIZED, FOR SOLUTION INTRAVENOUS at 06:33

## 2022-11-29 RX ADMIN — PIPERACILLIN AND TAZOBACTAM 25 GRAM(S): 4; .5 INJECTION, POWDER, LYOPHILIZED, FOR SOLUTION INTRAVENOUS at 23:02

## 2022-11-29 RX ADMIN — Medication 3 MILLIGRAM(S): at 00:15

## 2022-11-29 RX ADMIN — DORNASE ALFA 5 MILLIGRAM(S): 1 SOLUTION RESPIRATORY (INHALATION) at 14:15

## 2022-11-29 NOTE — CONSULT NOTE ADULT - TIME BILLING
Briefly, 61 yo female with extensive prior secondhand smoke exposure, presented to OSH last week with chest pain, found to have R breast mass, L lung mass, told she had "empyema" however no pleural fluid sampling done at that time; instead discharged on Augmentin plus azithromycin (took X 1 day; continued to feel unwell so her paramedic family member administered a dose of IV meropenem on Thanksgiving. Now s/p L pleural pigtail placement--fluid exudative by Light's criteria; gram stain negative, non-acidic pH, no haroldo purulence, so ?empyema; ddx includes parapneumonic vs. malignant effusion. To f/u pleural fluid studies (including cytology) and findings from pending CTC and BAL (gram stain, culture; if lung mass encountered, then would recommend biopsy). Continue empiric Zosyn.

## 2022-11-29 NOTE — PROGRESS NOTE ADULT - PROBLEM SELECTOR PLAN 1
Pt met 3/4 SIRS criteria on admission: HR 95, RR 22, WBC 19k. Pt with low-grade temperatures (Tmax 99F), acute on chronic cough, CP, dyspnea, and weakness x1 week. CXR with L pleural effusion. CT chest/abdomen at outside hospital on 11/23 with L pleural effusion and probable partially cystic mass at L lung base. Sx likely secondary to empyema. s/p zosyn 3.375 x1 in ED.   Ur legionella neg    Plan:   - per Dr. Delgado, bronch 11/29  - c/w zosyn 3.375g q6h  -started robitussin 100mg q8 for cough  - BCx NGTD  - F/u urine Strep  - F/u MRSA swab, RVP Pt met 3/4 SIRS criteria on admission: HR 95, RR 22, WBC 19k. Pt with low-grade temperatures (Tmax 99F), acute on chronic cough, CP, dyspnea, and weakness x1 week. CXR with L pleural effusion. CT chest/abdomen at outside hospital on 11/23 with L pleural effusion and probable partially cystic mass at L lung base. Sx likely secondary to empyema. s/p zosyn 3.375 x1 in ED.   Ur legionella neg    Plan:   - per Dr. Delgado, bronch postponed to 11/30.   - c/w zosyn 3.375g q6h  -started robitussin 100mg q8 for cough  - BCx NGTD  - F/u MRSA swab, RVP

## 2022-11-29 NOTE — CONSULT NOTE ADULT - SUBJECTIVE AND OBJECTIVE BOX
INFECTIOUS DISEASES INITIAL CONSULT NOTE    HPI:  61 y/o F with PMHx of Crohn's, diverticulosis, asthma, and benign breast cyst (in 1989) presented with acute on chronic cough, chest pain, and weakness. Pt states she had a chronic intermittent cough with sputum production for 4 years; sputum varied in color but sometimes greenish. Reports increased cough frequency, increased sputum production, weakness, and decreased PO intake over the past week, with associated dyspnea and chest pain over the past 2 days. Also reports low-grade temperatures at home (Tmax 99F). Pt presented to her PCP 2 days ago, who sent her to the hospital. At the hospital, she had a CT chest and abdomen that demonstrated moderate L-sided pleural effusion, probably partially cystic mass at L lung base anteriorly, ~4.7cm, retroareolar R breast mass, moderately sized hiatal hernia, hepatomegaly, absent spleen, and diverticulosis. She was diagnosed with empyema and discharged home with Augmentin and azithromycin, but she  continued feeling weak so she presented to the ED today. Reports improvement in dyspnea while on 2L NC. Dyspnea is worse when lying down. No HA, dizziness, abd pain.     ED Course:   Vitals on Presentation: T99.8F | /89 | HR 95 | RR 22 | O2sat 93% on RA --> 98% on 2L NC  Labs notable for WBC 19.56, platelets 491  CXR: +large L pleural effusion   Interventions: Tylenol 650mg x1, Zosyn 3.375 x1 (25 Nov 2022 17:30)      ID INTERVAL HPI: Patient reports feeling better since admission. Mild frustration with bronchoscopy now tomorrow as patient ate despite instruction to be NPO.     PAST MEDICAL & SURGICAL HISTORY:  Prediabetes      Diverticulosis      Crohn&#x27;s disease      History of cholecystectomy      S/P parathyroidectomy      Benign cyst of breast          Review of Systems:   Constitutional, eyes, ENT, cardiovascular, respiratory, gastrointestinal, genitourinary, integumentary, neurological, psychiatric and heme/lymph are otherwise negative other than noted above       ANTIBIOTICS:  MEDICATIONS  (STANDING):  alteplase  Injectable for Pleural Effusion 10 milliGRAM(s) IntraPleural. every 12 hours  budesonide 160 MICROgram(s)/formoterol 4.5 MICROgram(s) Inhaler 2 Puff(s) Inhalation two times a day  dornase dedra Solution for Pleural Effusion 5 milliGRAM(s) IntraPleural. every 12 hours  piperacillin/tazobactam IVPB.. 3.375 Gram(s) IV Intermittent every 8 hours  sodium chloride 0.9% Solution for Pleural Effusion 30 milliLiter(s) IntraPleural. every 12 hours    MEDICATIONS  (PRN):  acetaminophen     Tablet .. 650 milliGRAM(s) Oral every 6 hours PRN Temp greater or equal to 38C (100.4F), Mild Pain (1 - 3)  guaiFENesin Oral Liquid (Sugar-Free) 100 milliGRAM(s) Oral every 8 hours PRN Cough  melatonin 3 milliGRAM(s) Oral at bedtime PRN Insomnia      Allergies    tetracyclines (Swelling)    Intolerances        SOCIAL HISTORY:    FAMILY HISTORY:  FH: lung cancer (Father)    Family history of CLL (chronic lymphoid leukemia) (Mother)     no FH leading to current infection    Vital Signs Last 24 Hrs  T(C): 37 (29 Nov 2022 10:44), Max: 37.4 (28 Nov 2022 22:41)  T(F): 98.6 (29 Nov 2022 10:44), Max: 99.4 (28 Nov 2022 22:41)  HR: 82 (29 Nov 2022 10:44) (75 - 90)  BP: 120/71 (29 Nov 2022 10:44) (107/68 - 120/71)  BP(mean): --  RR: 18 (29 Nov 2022 10:44) (18 - 22)  SpO2: 93% (29 Nov 2022 10:44) (93% - 96%)    Parameters below as of 29 Nov 2022 10:44  Patient On (Oxygen Delivery Method): nasal cannula  O2 Flow (L/min): 3      11-28-22 @ 07:01  -  11-29-22 @ 07:00  --------------------------------------------------------  IN: 125 mL / OUT: 66 mL / NET: 59 mL    11-29-22 @ 07:01  -  11-29-22 @ 16:49  --------------------------------------------------------  IN: 50 mL / OUT: 0 mL / NET: 50 mL        PHYSICAL EXAM  Constitutional: alert, NAD  Eyes: the sclera and conjunctiva were normal.   ENT: the ears and nose were normal in appearance.   Neck: the appearance of the neck was normal and the neck was supple.   Pulmonary: no respiratory distress and lungs CTA bilaterally. chest tube site c/d/i without surrounding erythema or tenderness  Heart: heart rate was normal and rhythm regular, normal S1 and S2  Vascular: no peripheral edema  Abdomen: normal bowel sounds, soft, non-tender  Neurological: no focal deficits  Psychiatric: the affect was normal      LABS:                        10.2   14.20 )-----------( 611      ( 29 Nov 2022 05:30 )             32.0     11-29    134<L>  |  99  |  7   ----------------------------<  141<H>  4.2   |  26  |  0.68    Ca    9.0      29 Nov 2022 05:30  Phos  2.9     11-29  Mg     2.2     11-29    TPro  6.8  /  Alb  2.9<L>  /  TBili  0.2  /  DBili  x   /  AST  28  /  ALT  50<H>  /  AlkPhos  138<H>  11-29    PT/INR - ( 29 Nov 2022 05:30 )   PT: 16.6 sec;   INR: 1.39          PTT - ( 29 Nov 2022 05:30 )  PTT:30.0 sec      MICROBIOLOGY:    Culture - Acid Fast - Body Fluid w/Smear (collected 28 Nov 2022 18:28)  Source: Pleural Fl L pleural    Culture - Body Fluid with Gram Stain (collected 28 Nov 2022 18:28)  Source: Pleural Fl L pleural  Gram Stain (28 Nov 2022 20:31):    No organisms seen    Rare WBC's  Preliminary Report (29 Nov 2022 09:10):    No growth to date    Culture - Blood (collected 25 Nov 2022 16:45)  Source: .Blood Blood-Peripheral  Preliminary Report (28 Nov 2022 19:00):    No growth at 3 days.    Culture - Blood (collected 25 Nov 2022 16:45)  Source: .Blood Blood-Peripheral  Preliminary Report (28 Nov 2022 19:00):    No growth at 3 days.        RADIOLOGY & ADDITIONAL STUDIES: Reviewed.   Katya Christianson

## 2022-11-29 NOTE — PROGRESS NOTE ADULT - TIME BILLING
Patient seen and examined with house-staff during bedside rounds.  Resident note read, including vitals, physical findings, laboratory data, and radiological reports.   Revisions included below.  Direct personal management at bed side and extensive interpretation of the data.  Plan was outlined and discussed in details with the housestaff.  Decision making of high complexity  Action taken for acute disease activity to reflect the level of care provided:  - medication reconciliation  - review laboratory data  The patient ate this morning despite instruction to be n.p.o. after midnight.  The bronchoscopy was postponed till tomorrow.  ID consult was called input is still pending.  Patient did not drain much from the chest tube and no airleak.  Follow-up with tPA.  CT scan of the chest tomorrow after bronchoscopy.  Continue antibiotic.  Cultures are negative today.  Effusion is exudative in nature.  We will have breast surgery services to see the patient prior to discharge

## 2022-11-29 NOTE — PROGRESS NOTE ADULT - ASSESSMENT
61 y/o F with PMHx of Crohn's, diverticulosis, asthma, and benign breast cyst (in 1989) presented with acute on chronic cough, weakness, dyspnea, and chest pain x 1 week, CXR with L pleural effusion, found to meet SIRS criteria, likely 2/2 empyema and L pleural effusion.  61 y/o F with PMHx of Crohn's, diverticulosis, asthma, and benign breast cyst (in 1989) presented with acute on chronic cough, weakness, dyspnea, and chest pain x 1 week, CXR with L pleural effusion, found to meet SIRS criteria, likely 2/2 empyema and L pleural effusion. s/p chest tube placement, & pending bronchoscopy 11/30.

## 2022-11-29 NOTE — PROGRESS NOTE ADULT - PROBLEM SELECTOR PLAN 7
Plan:  F: None  E: replete K<4, Mg<2  N: kosher diet  VTE: none  GI: none  C: Full Code  D: 7Wo Plan:  F: None  E: replete K<4, Mg<2  N: kosher diet, NPO for bronch 11/30  VTE: none  GI: none  C: Full Code  D: 7Wo

## 2022-11-29 NOTE — PROGRESS NOTE ADULT - PROBLEM SELECTOR PLAN 3
Pt with low-grade temperatures (Tmax 99F), acute on chronic cough, CP, dyspnea, and weakness x1 week. CXR with L pleural effusion. CT chest/abdomen at outside hospital on 11/23 with L pleural effusion and probable partially cystic mass at L lung base.     - see "SIRS" plan Pt with low-grade temperatures (Tmax 99F), acute on chronic cough, CP, dyspnea, and weakness x1 week. CXR with L pleural effusion. CT chest/abdomen at outside hospital on 11/23 with L pleural effusion and probable partially cystic mass at L lung base.     - s/p chest tube placement. fluid studies consistent with exudative effusion  Plan:   - Bronchoscopy 11/30  - f/u pleural fluid cytology  - f/u repeat CT chest

## 2022-11-29 NOTE — CONSULT NOTE ADULT - ASSESSMENT
63y/o female with PMHx of Crohn's, diverticulosis, asthma, and benign breast cyst (in 1989) presented with acute on chronic cough, chest pain, and weakness. Was previously seen at [facility] for chest pain and dyspnea where CT chest and abd/pelvis reportedly showed moderate left pleural effusion, cystic mass of left lung base and was dx with empyema and discharged with augmentin and azithromycin for which patient took two days of (also admitted to being given one dose of meropenem by a family member via IV), started feeling ill with worsened weakness and presented to [facility]. While at [facility], patient was started on zosyn with ultrasound revealing KHADIJAH PNA loculated effusion for which a chest tube was placed with exudative output with gram stain and culture ngtd. Plan is for repeat CT chest and plan for bronchoscopy tomorrow 11/30.    Patient's father had history of heavy smoking leading to possible second hand smoke exposure to patient, raising possibility of malignancy which can be an etiology for patient's exudative effusion.    Plan:  - c/w zosyn 3.375g q8h  - For bronchoscopy tomorrow 11/30, please obtain biopsy of lung mass, cytology, as well as a sputum culture and grain stain    ID team 1 will continue to follow. 63y/o female with PMHx of Crohn's, diverticulosis, asthma, and benign breast cyst (in 1989) presented with acute on chronic cough, chest pain, and weakness. Was previously seen at [facility] for chest pain and dyspnea where CT chest and abd/pelvis reportedly showed moderate left pleural effusion, cystic mass of left lung base and was dx with empyema and discharged with augmentin and azithromycin for which patient took two days of (also admitted to being given one dose of meropenem by a family member via IV), started feeling ill with worsened weakness and presented to [facility]. While at [facility], patient was started on zosyn with ultrasound revealing KHADIJAH PNA loculated effusion for which a chest tube was placed with exudative output with gram stain and culture ngtd. Plan is for repeat CT chest and plan for bronchoscopy tomorrow 11/30.    Patient's father had history of heavy smoking leading to possible second hand smoke exposure to patient as well as history of benign breast cyst, raising possibility of malignancy which can be an etiology for patient's exudative effusion. Patient's white count improving with a Tmax 11/28 100.4 and clinically feels better on current abx regimen and blood culture remains negative.    Plan:  - c/w zosyn 3.375g q8h  - For bronchoscopy tomorrow 11/30, please obtain biopsy of lung mass, cytology, as well as a sputum culture and grain stain  - f/u repeat CT chest    ID team 1 will continue to follow. 63y/o female with PMHx of Crohn's, diverticulosis, asthma, and benign breast cyst (in 1989) presented with acute on chronic cough, chest pain, and weakness. Was previously seen at [facility] for chest pain and dyspnea where CT chest and abd/pelvis reportedly showed moderate left pleural effusion, ?cystic mass of left lung base and was dx with empyema and discharged with augmentin and azithromycin for which patient took two days of (also admitted to being given one dose of meropenem by a paramedic family member via IV), started feeling ill with worsened weakness and presented to [facility]. While at [facility], patient was started on zosyn with ultrasound revealing KHADIJAH PNA loculated effusion for which a chest tube was placed with exudative output with gram stain and culture ngtd. Plan is for repeat CT chest and plan for bronchoscopy tomorrow 11/30.    Patient's father had history of heavy smoking leading to possible second hand smoke exposure to patient as well as history of benign breast cyst, raising possibility of differential dx of malignancy which can be an etiology for patient's exudative effusion. Differential diagnosis includes malignancy effusion vs parapneumonic effusion. Patient's white count improving with a Tmax 11/28 100.4 and clinically feels better on current abx regimen and blood culture remains negative.    Plan:  - c/w zosyn 3.375g q8h  - For bronchoscopy tomorrow 11/30, please obtain BAL, sputum culture and grain stain, and if lung mass encountered then biopsy  - f/u pleural fluid cytology  - f/u repeat CT chest    ID team 1 will continue to follow.

## 2022-11-29 NOTE — PROGRESS NOTE ADULT - PROBLEM SELECTOR PLAN 2
Pt with low-grade temperatures (Tmax 99F), acute on chronic cough, CP, dyspnea, and weakness x1 week. CXR with L pleural effusion. CT chest/abdomen at outside hospital on 11/23 with L pleural effusion and probable partially cystic mass at L lung base. Was diagnosed with empyema at outside hospital and sent home with Augmentin and azithromycin.  Tmax 100.3 F, IV tylenol given, 11/26  afebrile overnight    - see "SIRS" plan Pt with low-grade temperatures (Tmax 99F), acute on chronic cough, CP, dyspnea, and weakness x1 week. CXR with L pleural effusion. CT chest/abdomen at outside hospital on 11/23 with L pleural effusion and probable partially cystic mass at L lung base. Was diagnosed with empyema at outside hospital and sent home with Augmentin and azithromycin.  - c/w zosyn 3.375g q8h  - ID consulted, f/u recs

## 2022-11-29 NOTE — PROGRESS NOTE ADULT - SUBJECTIVE AND OBJECTIVE BOX
OVERNIGHT EVENTS:    SUBJECTIVE / INTERVAL HPI: Patient seen and examined at bedside. No complaints at this time. Patient denies: fever, chills, dizziness, weakness, HA, Changes in vision, CP, palpitations, SOB, cough, N/V/D/C, dysuria, changes in bowel movements, LE edema. ROS otherwise negative.    VITAL SIGNS:  Vital Signs Last 24 Hrs  T(C): 36.7 (29 Nov 2022 05:20), Max: 38 (28 Nov 2022 11:07)  T(F): 98 (29 Nov 2022 05:20), Max: 100.4 (28 Nov 2022 11:07)  HR: 75 (29 Nov 2022 05:20) (75 - 92)  BP: 113/68 (29 Nov 2022 05:20) (104/65 - 113/68)  BP(mean): --  RR: 18 (29 Nov 2022 05:20) (18 - 22)  SpO2: 94% (29 Nov 2022 05:20) (94% - 96%)    Parameters below as of 29 Nov 2022 05:20  Patient On (Oxygen Delivery Method): room air        PHYSICAL EXAM:    General: NAD  HEENT: NCAT  Neck: supple, trachea midline  Cardiovascular: S1, S2 normal; RRR, no M/G/R  Respiratory: CTABL; no W/R/R  Gastrointestinal: soft, nontender, nondistended. bowel sounds present.  Skin: no ulcerations or visible rashes appreciated  Extremities: WWP; no edema, clubbing or cyanosis  Vascular: 2+ radial, DP/PT pulses B/L  Neurological: AAOx3; CN II-XII grossly intact; no focal deficits    MEDICATIONS:  MEDICATIONS  (STANDING):  budesonide 160 MICROgram(s)/formoterol 4.5 MICROgram(s) Inhaler 2 Puff(s) Inhalation two times a day  piperacillin/tazobactam IVPB.. 3.375 Gram(s) IV Intermittent every 8 hours    MEDICATIONS  (PRN):  acetaminophen     Tablet .. 650 milliGRAM(s) Oral every 6 hours PRN Temp greater or equal to 38C (100.4F), Mild Pain (1 - 3)  guaiFENesin Oral Liquid (Sugar-Free) 100 milliGRAM(s) Oral every 8 hours PRN Cough  melatonin 3 milliGRAM(s) Oral at bedtime PRN Insomnia      ALLERGIES:  Allergies    tetracyclines (Swelling)    Intolerances        LABS:                        10.2   14.20 )-----------( 611      ( 29 Nov 2022 05:30 )             32.0     11-29    134<L>  |  99  |  7   ----------------------------<  141<H>  4.2   |  26  |  0.68    Ca    9.0      29 Nov 2022 05:30  Phos  2.9     11-29  Mg     2.2     11-29    TPro  6.8  /  Alb  2.9<L>  /  TBili  0.2  /  DBili  x   /  AST  28  /  ALT  50<H>  /  AlkPhos  138<H>  11-29    PT/INR - ( 29 Nov 2022 05:30 )   PT: 16.6 sec;   INR: 1.39          PTT - ( 29 Nov 2022 05:30 )  PTT:30.0 sec    CAPILLARY BLOOD GLUCOSE          RADIOLOGY & ADDITIONAL TESTS: Reviewed. OVERNIGHT EVENTS: lidocaine patch for pain at site of chest tube    SUBJECTIVE / INTERVAL HPI: Patient seen and examined at bedside. Still feels mild pain at chest tube site, states lidocaine patch helped. Stated she ate despite NPO order, explained bronch will be postponed. No f/c/chest pain or dyspnea.     VITAL SIGNS:  Vital Signs Last 24 Hrs  T(C): 36.7 (29 Nov 2022 05:20), Max: 38 (28 Nov 2022 11:07)  T(F): 98 (29 Nov 2022 05:20), Max: 100.4 (28 Nov 2022 11:07)  HR: 75 (29 Nov 2022 05:20) (75 - 92)  BP: 113/68 (29 Nov 2022 05:20) (104/65 - 113/68)  BP(mean): --  RR: 18 (29 Nov 2022 05:20) (18 - 22)  SpO2: 94% (29 Nov 2022 05:20) (94% - 96%)    Parameters below as of 29 Nov 2022 05:20  Patient On (Oxygen Delivery Method): room air        PHYSICAL EXAM:  General: NAD, lying in bed comfortably with  at bedside  HEENT: NCAT; PERRL, anicteric sclera; MMM  Neck: supple, trachea midline  Cardiovascular: S1, S2 normal; RRR, no M/G/R  Respiratory: decreased breath sounds L lung, improved from prior exam. Chest tube in place. site c/d/i.   Gastrointestinal: soft, nontender, nondistended. bowel sounds present.  Skin: no ulcerations or visible rashes appreciated  Extremities: WWP; no edema, clubbing or cyanosis  Vascular: 2+ DP pulses  Neurological: AAOx3; CN II-XII grossly intact; no focal deficits    MEDICATIONS:  MEDICATIONS  (STANDING):  budesonide 160 MICROgram(s)/formoterol 4.5 MICROgram(s) Inhaler 2 Puff(s) Inhalation two times a day  piperacillin/tazobactam IVPB.. 3.375 Gram(s) IV Intermittent every 8 hours    MEDICATIONS  (PRN):  acetaminophen     Tablet .. 650 milliGRAM(s) Oral every 6 hours PRN Temp greater or equal to 38C (100.4F), Mild Pain (1 - 3)  guaiFENesin Oral Liquid (Sugar-Free) 100 milliGRAM(s) Oral every 8 hours PRN Cough  melatonin 3 milliGRAM(s) Oral at bedtime PRN Insomnia      ALLERGIES:  Allergies    tetracyclines (Swelling)    Intolerances        LABS:                        10.2   14.20 )-----------( 611      ( 29 Nov 2022 05:30 )             32.0     11-29    134<L>  |  99  |  7   ----------------------------<  141<H>  4.2   |  26  |  0.68    Ca    9.0      29 Nov 2022 05:30  Phos  2.9     11-29  Mg     2.2     11-29    TPro  6.8  /  Alb  2.9<L>  /  TBili  0.2  /  DBili  x   /  AST  28  /  ALT  50<H>  /  AlkPhos  138<H>  11-29    PT/INR - ( 29 Nov 2022 05:30 )   PT: 16.6 sec;   INR: 1.39          PTT - ( 29 Nov 2022 05:30 )  PTT:30.0 sec    CAPILLARY BLOOD GLUCOSE          RADIOLOGY & ADDITIONAL TESTS: Reviewed.

## 2022-11-30 ENCOUNTER — RESULT REVIEW (OUTPATIENT)
Age: 62
End: 2022-11-30

## 2022-11-30 LAB
ALBUMIN SERPL ELPH-MCNC: 3 G/DL — LOW (ref 3.3–5)
ALP SERPL-CCNC: 136 U/L — HIGH (ref 40–120)
ALT FLD-CCNC: 49 U/L — HIGH (ref 10–45)
ANION GAP SERPL CALC-SCNC: 10 MMOL/L — SIGNIFICANT CHANGE UP (ref 5–17)
ANISOCYTOSIS BLD QL: SLIGHT — SIGNIFICANT CHANGE UP
APTT BLD: 27.6 SEC — SIGNIFICANT CHANGE UP (ref 27.5–35.5)
AST SERPL-CCNC: 24 U/L — SIGNIFICANT CHANGE UP (ref 10–40)
B PERT IGG+IGM PNL SER: SIGNIFICANT CHANGE UP
BASOPHILS # BLD AUTO: 0 K/UL — SIGNIFICANT CHANGE UP (ref 0–0.2)
BASOPHILS NFR BLD AUTO: 0 % — SIGNIFICANT CHANGE UP (ref 0–2)
BILIRUB SERPL-MCNC: 0.3 MG/DL — SIGNIFICANT CHANGE UP (ref 0.2–1.2)
BUN SERPL-MCNC: 7 MG/DL — SIGNIFICANT CHANGE UP (ref 7–23)
BURR CELLS BLD QL SMEAR: PRESENT — SIGNIFICANT CHANGE UP
CALCIUM SERPL-MCNC: 8.7 MG/DL — SIGNIFICANT CHANGE UP (ref 8.4–10.5)
CHLORIDE SERPL-SCNC: 101 MMOL/L — SIGNIFICANT CHANGE UP (ref 96–108)
CO2 SERPL-SCNC: 26 MMOL/L — SIGNIFICANT CHANGE UP (ref 22–31)
COLOR FLD: SIGNIFICANT CHANGE UP
COMMENT - FLUIDS: SIGNIFICANT CHANGE UP
COMMENT - FLUIDS: SIGNIFICANT CHANGE UP
CREAT SERPL-MCNC: 0.66 MG/DL — SIGNIFICANT CHANGE UP (ref 0.5–1.3)
CULTURE RESULTS: SIGNIFICANT CHANGE UP
CULTURE RESULTS: SIGNIFICANT CHANGE UP
DACRYOCYTES BLD QL SMEAR: SLIGHT — SIGNIFICANT CHANGE UP
EGFR: 100 ML/MIN/1.73M2 — SIGNIFICANT CHANGE UP
EOSINOPHIL # BLD AUTO: 0.13 K/UL — SIGNIFICANT CHANGE UP (ref 0–0.5)
EOSINOPHIL NFR BLD AUTO: 0.9 % — SIGNIFICANT CHANGE UP (ref 0–6)
FLUID INTAKE SUBSTANCE CLASS: SIGNIFICANT CHANGE UP
GIANT PLATELETS BLD QL SMEAR: PRESENT — SIGNIFICANT CHANGE UP
GLUCOSE SERPL-MCNC: 119 MG/DL — HIGH (ref 70–99)
GRAM STN FLD: SIGNIFICANT CHANGE UP
HCT VFR BLD CALC: 36.2 % — SIGNIFICANT CHANGE UP (ref 34.5–45)
HGB BLD-MCNC: 11.3 G/DL — LOW (ref 11.5–15.5)
HYPOCHROMIA BLD QL: SLIGHT — SIGNIFICANT CHANGE UP
INR BLD: 1.33 — HIGH (ref 0.88–1.16)
LYMPHOCYTES # BLD AUTO: 17.4 % — SIGNIFICANT CHANGE UP (ref 13–44)
LYMPHOCYTES # BLD AUTO: 2.49 K/UL — SIGNIFICANT CHANGE UP (ref 1–3.3)
LYMPHOCYTES # FLD: 8 % — SIGNIFICANT CHANGE UP
MACROCYTES BLD QL: SLIGHT — SIGNIFICANT CHANGE UP
MAGNESIUM SERPL-MCNC: 2.5 MG/DL — SIGNIFICANT CHANGE UP (ref 1.6–2.6)
MANUAL SMEAR VERIFICATION: SIGNIFICANT CHANGE UP
MCHC RBC-ENTMCNC: 28.3 PG — SIGNIFICANT CHANGE UP (ref 27–34)
MCHC RBC-ENTMCNC: 31.2 GM/DL — LOW (ref 32–36)
MCV RBC AUTO: 90.7 FL — SIGNIFICANT CHANGE UP (ref 80–100)
MICROCYTES BLD QL: SLIGHT — SIGNIFICANT CHANGE UP
MONOCYTES # BLD AUTO: 0.62 K/UL — SIGNIFICANT CHANGE UP (ref 0–0.9)
MONOCYTES NFR BLD AUTO: 4.3 % — SIGNIFICANT CHANGE UP (ref 2–14)
MONOS+MACROS # FLD: 23 % — SIGNIFICANT CHANGE UP
NEUTROPHILS # BLD AUTO: 11.08 K/UL — HIGH (ref 1.8–7.4)
NEUTROPHILS NFR BLD AUTO: 77.4 % — HIGH (ref 43–77)
NEUTROPHILS-BODY FLUID: 69 % — SIGNIFICANT CHANGE UP
NON-GYNECOLOGICAL CYTOLOGY STUDY: SIGNIFICANT CHANGE UP
OVALOCYTES BLD QL SMEAR: SLIGHT — SIGNIFICANT CHANGE UP
PHOSPHATE SERPL-MCNC: 3.1 MG/DL — SIGNIFICANT CHANGE UP (ref 2.5–4.5)
PLAT MORPH BLD: ABNORMAL
PLATELET # BLD AUTO: 719 K/UL — HIGH (ref 150–400)
POIKILOCYTOSIS BLD QL AUTO: SIGNIFICANT CHANGE UP
POLYCHROMASIA BLD QL SMEAR: SLIGHT — SIGNIFICANT CHANGE UP
POTASSIUM SERPL-MCNC: 4.7 MMOL/L — SIGNIFICANT CHANGE UP (ref 3.5–5.3)
POTASSIUM SERPL-SCNC: 4.7 MMOL/L — SIGNIFICANT CHANGE UP (ref 3.5–5.3)
PROT SERPL-MCNC: 6.7 G/DL — SIGNIFICANT CHANGE UP (ref 6–8.3)
PROTHROM AB SERPL-ACNC: 15.9 SEC — HIGH (ref 10.5–13.4)
RBC # BLD: 3.99 M/UL — SIGNIFICANT CHANGE UP (ref 3.8–5.2)
RBC # FLD: 13.2 % — SIGNIFICANT CHANGE UP (ref 10.3–14.5)
RBC BLD AUTO: ABNORMAL
RCV VOL RI: 248 /UL — HIGH (ref 0–0)
SCHISTOCYTES BLD QL AUTO: SLIGHT — SIGNIFICANT CHANGE UP
SODIUM SERPL-SCNC: 137 MMOL/L — SIGNIFICANT CHANGE UP (ref 135–145)
SPECIMEN SOURCE FLD: SIGNIFICANT CHANGE UP
SPECIMEN SOURCE: SIGNIFICANT CHANGE UP
SPHEROCYTES BLD QL SMEAR: SLIGHT — SIGNIFICANT CHANGE UP
TOTAL NUCLEATED CELL COUNT, BODY FLUID: 249 /UL — SIGNIFICANT CHANGE UP
TUBE TYPE: SIGNIFICANT CHANGE UP
WBC # BLD: 14.31 K/UL — HIGH (ref 3.8–10.5)
WBC # FLD AUTO: 14.31 K/UL — HIGH (ref 3.8–10.5)

## 2022-11-30 PROCEDURE — 88112 CYTOPATH CELL ENHANCE TECH: CPT | Mod: 26

## 2022-11-30 PROCEDURE — 88305 TISSUE EXAM BY PATHOLOGIST: CPT | Mod: 26

## 2022-11-30 PROCEDURE — 88312 SPECIAL STAINS GROUP 1: CPT | Mod: 26

## 2022-11-30 PROCEDURE — 99232 SBSQ HOSP IP/OBS MODERATE 35: CPT

## 2022-11-30 PROCEDURE — 71045 X-RAY EXAM CHEST 1 VIEW: CPT | Mod: 26

## 2022-11-30 PROCEDURE — 31624 DX BRONCHOSCOPE/LAVAGE: CPT | Mod: GC

## 2022-11-30 PROCEDURE — 99233 SBSQ HOSP IP/OBS HIGH 50: CPT | Mod: GC,25

## 2022-11-30 RX ORDER — LIDOCAINE 4 G/100G
1 CREAM TOPICAL ONCE
Refills: 0 | Status: COMPLETED | OUTPATIENT
Start: 2022-11-30 | End: 2022-11-30

## 2022-11-30 RX ORDER — SENNA PLUS 8.6 MG/1
1 TABLET ORAL ONCE
Refills: 0 | Status: COMPLETED | OUTPATIENT
Start: 2022-11-30 | End: 2022-11-30

## 2022-11-30 RX ORDER — ENOXAPARIN SODIUM 100 MG/ML
40 INJECTION SUBCUTANEOUS EVERY 24 HOURS
Refills: 0 | Status: DISCONTINUED | OUTPATIENT
Start: 2022-11-30 | End: 2022-11-30

## 2022-11-30 RX ORDER — POLYETHYLENE GLYCOL 3350 17 G/17G
17 POWDER, FOR SOLUTION ORAL ONCE
Refills: 0 | Status: COMPLETED | OUTPATIENT
Start: 2022-11-30 | End: 2022-11-30

## 2022-11-30 RX ORDER — ENOXAPARIN SODIUM 100 MG/ML
40 INJECTION SUBCUTANEOUS EVERY 24 HOURS
Refills: 0 | Status: DISCONTINUED | OUTPATIENT
Start: 2022-11-30 | End: 2022-12-09

## 2022-11-30 RX ADMIN — SODIUM CHLORIDE 30 MILLILITER(S): 9 INJECTION INTRAMUSCULAR; INTRAVENOUS; SUBCUTANEOUS at 17:45

## 2022-11-30 RX ADMIN — PIPERACILLIN AND TAZOBACTAM 25 GRAM(S): 4; .5 INJECTION, POWDER, LYOPHILIZED, FOR SOLUTION INTRAVENOUS at 14:34

## 2022-11-30 RX ADMIN — DORNASE ALFA 5 MILLIGRAM(S): 1 SOLUTION RESPIRATORY (INHALATION) at 08:47

## 2022-11-30 RX ADMIN — POLYETHYLENE GLYCOL 3350 17 GRAM(S): 17 POWDER, FOR SOLUTION ORAL at 22:12

## 2022-11-30 RX ADMIN — ALTEPLASE 10 MILLIGRAM(S): KIT at 17:44

## 2022-11-30 RX ADMIN — DORNASE ALFA 5 MILLIGRAM(S): 1 SOLUTION RESPIRATORY (INHALATION) at 17:44

## 2022-11-30 RX ADMIN — Medication 100 MILLIGRAM(S): at 17:20

## 2022-11-30 RX ADMIN — PIPERACILLIN AND TAZOBACTAM 25 GRAM(S): 4; .5 INJECTION, POWDER, LYOPHILIZED, FOR SOLUTION INTRAVENOUS at 22:12

## 2022-11-30 RX ADMIN — ENOXAPARIN SODIUM 40 MILLIGRAM(S): 100 INJECTION SUBCUTANEOUS at 14:34

## 2022-11-30 RX ADMIN — SENNA PLUS 1 TABLET(S): 8.6 TABLET ORAL at 14:34

## 2022-11-30 RX ADMIN — LIDOCAINE 1 PATCH: 4 CREAM TOPICAL at 06:17

## 2022-11-30 RX ADMIN — SODIUM CHLORIDE 30 MILLILITER(S): 9 INJECTION INTRAMUSCULAR; INTRAVENOUS; SUBCUTANEOUS at 07:00

## 2022-11-30 RX ADMIN — BUDESONIDE AND FORMOTEROL FUMARATE DIHYDRATE 2 PUFF(S): 160; 4.5 AEROSOL RESPIRATORY (INHALATION) at 07:00

## 2022-11-30 RX ADMIN — ALTEPLASE 10 MILLIGRAM(S): KIT at 07:00

## 2022-11-30 RX ADMIN — BUDESONIDE AND FORMOTEROL FUMARATE DIHYDRATE 2 PUFF(S): 160; 4.5 AEROSOL RESPIRATORY (INHALATION) at 17:20

## 2022-11-30 RX ADMIN — LIDOCAINE 1 PATCH: 4 CREAM TOPICAL at 01:37

## 2022-11-30 RX ADMIN — Medication 650 MILLIGRAM(S): at 17:20

## 2022-11-30 RX ADMIN — PIPERACILLIN AND TAZOBACTAM 25 GRAM(S): 4; .5 INJECTION, POWDER, LYOPHILIZED, FOR SOLUTION INTRAVENOUS at 07:00

## 2022-11-30 RX ADMIN — Medication 650 MILLIGRAM(S): at 17:55

## 2022-11-30 RX ADMIN — LIDOCAINE 1 PATCH: 4 CREAM TOPICAL at 12:13

## 2022-11-30 NOTE — PROGRESS NOTE ADULT - ASSESSMENT
63 y/o F with PMHx of Crohn's, diverticulosis, asthma, and benign breast cyst (in 1989) presented with acute on chronic cough, weakness, dyspnea, and chest pain x 1 week, CXR with L pleural effusion, found to meet SIRS criteria, likely 2/2 empyema and L pleural effusion. s/p chest tube placement, & pending bronchoscopy 11/30.

## 2022-11-30 NOTE — PROGRESS NOTE ADULT - SUBJECTIVE AND OBJECTIVE BOX
INFECTIOUS DISEASES CONSULT FOLLOW-UP NOTE      INTERVAL HPI/OVERNIGHT EVENTS: No overnight event.    Subjective: Patient seen and examined at bedside. Denies fevers, chills, cough, abdominal pain, diarrhea.      ANTIBIOTICS/RELEVANT:    MEDICATIONS  (STANDING):  alteplase  Injectable for Pleural Effusion 10 milliGRAM(s) IntraPleural. every 12 hours  budesonide 160 MICROgram(s)/formoterol 4.5 MICROgram(s) Inhaler 2 Puff(s) Inhalation two times a day  dornase dedra Solution for Pleural Effusion 5 milliGRAM(s) IntraPleural. every 12 hours  enoxaparin Injectable 40 milliGRAM(s) SubCutaneous every 24 hours  piperacillin/tazobactam IVPB.. 3.375 Gram(s) IV Intermittent every 8 hours  sodium chloride 0.9% Solution for Pleural Effusion 30 milliLiter(s) IntraPleural. every 12 hours    MEDICATIONS  (PRN):  acetaminophen     Tablet .. 650 milliGRAM(s) Oral every 6 hours PRN Temp greater or equal to 38C (100.4F), Mild Pain (1 - 3)  guaiFENesin Oral Liquid (Sugar-Free) 100 milliGRAM(s) Oral every 8 hours PRN Cough  melatonin 3 milliGRAM(s) Oral at bedtime PRN Insomnia        Vital Signs Last 24 Hrs  T(C): 36.8 (30 Nov 2022 12:17), Max: 36.9 (29 Nov 2022 20:30)  T(F): 98.2 (30 Nov 2022 12:17), Max: 98.4 (29 Nov 2022 20:30)  HR: 83 (30 Nov 2022 12:17) (79 - 88)  BP: 96/59 (30 Nov 2022 12:17) (96/59 - 102/63)  BP(mean): --  RR: 18 (30 Nov 2022 12:17) (16 - 18)  SpO2: 93% (30 Nov 2022 12:17) (92% - 95%)    Parameters below as of 30 Nov 2022 12:17  Patient On (Oxygen Delivery Method): nasal cannula  O2 Flow (L/min): 3      11-29-22 @ 07:01  -  11-30-22 @ 07:00  --------------------------------------------------------  IN: 50 mL / OUT: 760 mL / NET: -710 mL        PHYSICAL EXAM  Constitutional: alert, NAD  Eyes: the sclera and conjunctiva were normal.   ENT: the ears and nose were normal in appearance.   Neck: the appearance of the neck was normal and the neck was supple.   Pulmonary: no respiratory distress and lungs CTA bilaterally. chest tube area c/d/i  Heart: heart rate was normal and rhythm regular, normal S1 and S2  Vascular: no peripheral edema  Abdomen: normal bowel sounds, soft, non-tender  Neurological: no focal deficits  Psychiatric: the affect was normal      LABS:                        11.3   14.31 )-----------( 719      ( 30 Nov 2022 05:30 )             36.2     11-30    137  |  101  |  7   ----------------------------<  119<H>  4.7   |  26  |  0.66    Ca    8.7      30 Nov 2022 05:30  Phos  3.1     11-30  Mg     2.5     11-30    TPro  6.7  /  Alb  3.0<L>  /  TBili  0.3  /  DBili  x   /  AST  24  /  ALT  49<H>  /  AlkPhos  136<H>  11-30    PT/INR - ( 30 Nov 2022 05:30 )   PT: 15.9 sec;   INR: 1.33          PTT - ( 30 Nov 2022 05:30 )  PTT:27.6 sec      MICROBIOLOGY:    Culture - Bronchial (collected 30 Nov 2022 09:23)  Source: Lavage LEFT UPPER LOBE LINGULA BRONCHIAL-BAL-CULTURE  Gram Stain (30 Nov 2022 16:30):    No epithelial cells seen    Few WBC's    Rare Gram positive cocci in pairs    Culture - Fungal, Body Fluid (collected 28 Nov 2022 18:28)  Source: Pleural Fl L pleural  Preliminary Report (30 Nov 2022 06:47):    Testing in progress    Culture - Acid Fast - Body Fluid w/Smear (collected 28 Nov 2022 18:28)  Source: Pleural Fl L pleural  Preliminary Report (30 Nov 2022 15:05):    Culture is being performed.    Culture - Body Fluid with Gram Stain (collected 28 Nov 2022 18:28)  Source: Pleural Fl L pleural  Gram Stain (28 Nov 2022 20:31):    No organisms seen    Rare WBC's  Preliminary Report (29 Nov 2022 09:10):    No growth to date        RADIOLOGY & ADDITIONAL STUDIES:  Reviewed

## 2022-11-30 NOTE — PROGRESS NOTE ADULT - PROBLEM SELECTOR PLAN 7
Plan:  F: None  E: replete K<4, Mg<2  N: kosher diet  VTE: Lovenox 40 Qd  GI: none  C: Full Code  D: 7Wo

## 2022-11-30 NOTE — PRE-ANESTHESIA EVALUATION ADULT - WEIGHT IN LBS
191.1 Bexarotene Pregnancy And Lactation Text: This medication is Pregnancy Category X and should not be given to women who are pregnant or may become pregnant. This medication should not be used if you are breast feeding.

## 2022-11-30 NOTE — PROGRESS NOTE ADULT - PROBLEM SELECTOR PLAN 3
Pt with low-grade temperatures (Tmax 99F), acute on chronic cough, CP, dyspnea, and weakness x1 week. CXR with L pleural effusion. CT chest/abdomen at outside hospital on 11/23 with L pleural effusion and probable partially cystic mass at L lung base.     - s/p chest tube placement. fluid studies consistent with exudative effusion  Plan:   - Bronchoscopy 11/30  - f/u pleural fluid cytology  - f/u repeat CT chest

## 2022-11-30 NOTE — PROGRESS NOTE ADULT - ASSESSMENT
63y/o female with PMHx of Crohn's, diverticulosis, asthma, and benign breast cyst (in 1989) presented with acute on chronic cough, chest pain, and weakness. Was previously seen at [facility] for chest pain and dyspnea where CT chest and abd/pelvis reportedly showed moderate left pleural effusion, ?cystic mass of left lung base and was dx with empyema and discharged with augmentin and azithromycin for which patient took two days of (also admitted to being given one dose of meropenem by a paramedic family member via IV), started feeling ill with worsened weakness and presented to [facility]. While at [facility], patient was started on zosyn with ultrasound revealing KHADIJAH PNA loculated effusion for which a chest tube was placed with exudative output with gram stain and culture ngtd. Plan is for repeat CT chest and plan for bronchoscopy today.    Patient's father had history of heavy smoking leading to possible second hand smoke exposure to patient as well as history of benign breast cyst, raising possibility of differential dx of malignancy which can be an etiology for patient's exudative effusion. Differential diagnosis includes malignancy effusion vs parapneumonic effusion vs KHADIJAH PNA. Pleural fluid ngtd. Plan for bronchoscopy today.    Plan:  - c/w zosyn 3.375g q8h  - f/u BAL, sputum culture and grain stain, cytopathology, aspergillus galactomanan, fungitell, pleural fluid  - f/u repeat CT chest    ID team 1 will continue to follow.

## 2022-11-30 NOTE — PROGRESS NOTE ADULT - PROBLEM SELECTOR PLAN 1
Pt met 3/4 SIRS criteria on admission: HR 95, RR 22, WBC 19k. Pt with low-grade temperatures (Tmax 99F), acute on chronic cough, CP, dyspnea, and weakness x1 week. CXR with L pleural effusion. CT chest/abdomen at outside hospital on 11/23 with L pleural effusion and probable partially cystic mass at L lung base. Sx likely secondary to empyema. s/p zosyn 3.375 x1 in ED.   Ur legionella neg    Plan:   - Bronch performed 11/30 AM  - c/w zosyn 3.375g q6h  - C/w robitussin 100mg q8 for cough  - BCx NGTD  - F/u MRSA swab, RVP

## 2022-11-30 NOTE — PROGRESS NOTE ADULT - TIME BILLING
f/u BAL studies including gram stain, culture, cytopathology; pleural fluid cx ngtd; ?KHADIJAH pneumonia, L pleural effusion ?parapneumonic vs. malignant; continue empiric Zosyn.

## 2022-11-30 NOTE — PROGRESS NOTE ADULT - SUBJECTIVE AND OBJECTIVE BOX
OVERNIGHT EVENTS: Lidocaine patch placed around chest tube    SUBJECTIVE:  Patient seen and examined at bedside.  ROS: Patient denies h/n/v/d, fever, chills, cp, palpitations, sob, abd pain, leg swelling, rashes, dysuria, and changes in BM.     Vital Signs Last 12 Hrs  T(F): 97.8 (11-30-22 @ 05:11), Max: 97.8 (11-30-22 @ 05:11)  HR: 79 (11-30-22 @ 08:15) (79 - 79)  BP: 97/58 (11-30-22 @ 08:15) (97/58 - 97/58)  BP(mean): --  RR: 16 (11-30-22 @ 08:15) (16 - 16)  SpO2: 95% (11-30-22 @ 08:15) (95% - 95%)  I&O's Summary    29 Nov 2022 07:01  -  30 Nov 2022 07:00  --------------------------------------------------------  IN: 50 mL / OUT: 760 mL / NET: -710 mL        PHYSICAL EXAM:  Constitutional: NAD, comfortable in bed.  HEENT: NC/AT, PERRLA, EOMI, no conjunctival pallor or scleral icterus, MMM  Neck: Supple, no JVD  Respiratory: Decreased breath sounds L lung. Chest tube in place. site c/d/i. No w/r/r.   Cardiovascular: RRR, normal S1 and S2, no m/r/g.   Gastrointestinal: +BS, soft NTND, no guarding or rebound tenderness, no palpable masses   Extremities: wwp; no cyanosis, clubbing or edema.   Vascular: Pulses equal and strong throughout.   Neurological: AAOx3, no CN deficits, strength and sensation intact throughout.   Skin: No gross skin abnormalities or rashes        LABS:                        11.3   14.31 )-----------( 719      ( 30 Nov 2022 05:30 )             36.2     11-30    137  |  101  |  7   ----------------------------<  119<H>  4.7   |  26  |  0.66    Ca    8.7      30 Nov 2022 05:30  Phos  3.1     11-30  Mg     2.5     11-30    TPro  6.7  /  Alb  3.0<L>  /  TBili  0.3  /  DBili  x   /  AST  24  /  ALT  49<H>  /  AlkPhos  136<H>  11-30    PT/INR - ( 30 Nov 2022 05:30 )   PT: 15.9 sec;   INR: 1.33          PTT - ( 30 Nov 2022 05:30 )  PTT:27.6 sec        RADIOLOGY & ADDITIONAL TESTS:    MEDICATIONS  (STANDING):  alteplase  Injectable for Pleural Effusion 10 milliGRAM(s) IntraPleural. every 12 hours  budesonide 160 MICROgram(s)/formoterol 4.5 MICROgram(s) Inhaler 2 Puff(s) Inhalation two times a day  dornase dedra Solution for Pleural Effusion 5 milliGRAM(s) IntraPleural. every 12 hours  enoxaparin Injectable 40 milliGRAM(s) SubCutaneous every 24 hours  piperacillin/tazobactam IVPB.. 3.375 Gram(s) IV Intermittent every 8 hours  sodium chloride 0.9% Solution for Pleural Effusion 30 milliLiter(s) IntraPleural. every 12 hours    MEDICATIONS  (PRN):  acetaminophen     Tablet .. 650 milliGRAM(s) Oral every 6 hours PRN Temp greater or equal to 38C (100.4F), Mild Pain (1 - 3)  guaiFENesin Oral Liquid (Sugar-Free) 100 milliGRAM(s) Oral every 8 hours PRN Cough  melatonin 3 milliGRAM(s) Oral at bedtime PRN Insomnia

## 2022-11-30 NOTE — PROGRESS NOTE ADULT - TIME BILLING
Patient seen and examined with house-staff during bedside rounds.  Resident note read, including vitals, physical findings, laboratory data, and radiological reports.   Revisions included below.  Direct personal management at bed side and extensive interpretation of the data.  Plan was outlined and discussed in details with the housestaff.  Decision making of high complexity  Action taken for acute disease activity to reflect the level of care provided:  - medication reconciliation  - review laboratory data  Was seen several times.  Bronchoscopy did not reveal endobronchial lesion.  Lavage of the lingula was performed.  The cells are predominantly inflammatory.  Cultures are gram stain positive gram-positive cocci.  Continue Zosyn.  Monitor white cells.  The CT chest x-ray revealed left right apical pneumothorax but the chest tube was on waterseal.  Contacted chest tube to suction.  Patient is given tPA..  Patient drained 2 L so far.  CT scan of the chest tomorrow.  Follow-up on cytology.  Out of bed in chair.  Patient tolerated bronchoscopy with no side effect

## 2022-12-01 ENCOUNTER — TRANSCRIPTION ENCOUNTER (OUTPATIENT)
Age: 62
End: 2022-12-01

## 2022-12-01 LAB
ANION GAP SERPL CALC-SCNC: 9 MMOL/L — SIGNIFICANT CHANGE UP (ref 5–17)
APTT BLD: 28.7 SEC — SIGNIFICANT CHANGE UP (ref 27.5–35.5)
BASOPHILS # BLD AUTO: 0.07 K/UL — SIGNIFICANT CHANGE UP (ref 0–0.2)
BASOPHILS NFR BLD AUTO: 0.6 % — SIGNIFICANT CHANGE UP (ref 0–2)
BUN SERPL-MCNC: 6 MG/DL — LOW (ref 7–23)
CALCIUM SERPL-MCNC: 8.4 MG/DL — SIGNIFICANT CHANGE UP (ref 8.4–10.5)
CHLORIDE SERPL-SCNC: 100 MMOL/L — SIGNIFICANT CHANGE UP (ref 96–108)
CO2 SERPL-SCNC: 26 MMOL/L — SIGNIFICANT CHANGE UP (ref 22–31)
CREAT SERPL-MCNC: 0.63 MG/DL — SIGNIFICANT CHANGE UP (ref 0.5–1.3)
EGFR: 101 ML/MIN/1.73M2 — SIGNIFICANT CHANGE UP
EOSINOPHIL # BLD AUTO: 0.22 K/UL — SIGNIFICANT CHANGE UP (ref 0–0.5)
EOSINOPHIL NFR BLD AUTO: 1.9 % — SIGNIFICANT CHANGE UP (ref 0–6)
GLUCOSE SERPL-MCNC: 180 MG/DL — HIGH (ref 70–99)
HCT VFR BLD CALC: 36.5 % — SIGNIFICANT CHANGE UP (ref 34.5–45)
HGB BLD-MCNC: 11.2 G/DL — LOW (ref 11.5–15.5)
IMM GRANULOCYTES NFR BLD AUTO: 2.9 % — HIGH (ref 0–0.9)
INR BLD: 1.32 — HIGH (ref 0.88–1.16)
LYMPHOCYTES # BLD AUTO: 19 % — SIGNIFICANT CHANGE UP (ref 13–44)
LYMPHOCYTES # BLD AUTO: 2.25 K/UL — SIGNIFICANT CHANGE UP (ref 1–3.3)
MAGNESIUM SERPL-MCNC: 2.2 MG/DL — SIGNIFICANT CHANGE UP (ref 1.6–2.6)
MCHC RBC-ENTMCNC: 28.7 PG — SIGNIFICANT CHANGE UP (ref 27–34)
MCHC RBC-ENTMCNC: 30.7 GM/DL — LOW (ref 32–36)
MCV RBC AUTO: 93.6 FL — SIGNIFICANT CHANGE UP (ref 80–100)
MONOCYTES # BLD AUTO: 1.27 K/UL — HIGH (ref 0–0.9)
MONOCYTES NFR BLD AUTO: 10.7 % — SIGNIFICANT CHANGE UP (ref 2–14)
NEUTROPHILS # BLD AUTO: 7.72 K/UL — HIGH (ref 1.8–7.4)
NEUTROPHILS NFR BLD AUTO: 64.9 % — SIGNIFICANT CHANGE UP (ref 43–77)
NIGHT BLUE STAIN TISS: SIGNIFICANT CHANGE UP
NRBC # BLD: 0 /100 WBCS — SIGNIFICANT CHANGE UP (ref 0–0)
PHOSPHATE SERPL-MCNC: 2.3 MG/DL — LOW (ref 2.5–4.5)
PLATELET # BLD AUTO: 764 K/UL — HIGH (ref 150–400)
POTASSIUM SERPL-MCNC: 3.8 MMOL/L — SIGNIFICANT CHANGE UP (ref 3.5–5.3)
POTASSIUM SERPL-SCNC: 3.8 MMOL/L — SIGNIFICANT CHANGE UP (ref 3.5–5.3)
PROTHROM AB SERPL-ACNC: 15.7 SEC — HIGH (ref 10.5–13.4)
RBC # BLD: 3.9 M/UL — SIGNIFICANT CHANGE UP (ref 3.8–5.2)
RBC # FLD: 13.2 % — SIGNIFICANT CHANGE UP (ref 10.3–14.5)
SODIUM SERPL-SCNC: 135 MMOL/L — SIGNIFICANT CHANGE UP (ref 135–145)
SPECIMEN SOURCE: SIGNIFICANT CHANGE UP
WBC # BLD: 11.87 K/UL — HIGH (ref 3.8–10.5)
WBC # FLD AUTO: 11.87 K/UL — HIGH (ref 3.8–10.5)

## 2022-12-01 PROCEDURE — 99232 SBSQ HOSP IP/OBS MODERATE 35: CPT

## 2022-12-01 PROCEDURE — 71250 CT THORAX DX C-: CPT | Mod: 26

## 2022-12-01 PROCEDURE — 99232 SBSQ HOSP IP/OBS MODERATE 35: CPT | Mod: GC

## 2022-12-01 RX ORDER — PIPERACILLIN AND TAZOBACTAM 4; .5 G/20ML; G/20ML
3.38 INJECTION, POWDER, LYOPHILIZED, FOR SOLUTION INTRAVENOUS EVERY 8 HOURS
Refills: 0 | Status: DISCONTINUED | OUTPATIENT
Start: 2022-12-01 | End: 2022-12-02

## 2022-12-01 RX ORDER — LIDOCAINE 4 G/100G
1 CREAM TOPICAL ONCE
Refills: 0 | Status: COMPLETED | OUTPATIENT
Start: 2022-12-01 | End: 2022-12-01

## 2022-12-01 RX ADMIN — PIPERACILLIN AND TAZOBACTAM 25 GRAM(S): 4; .5 INJECTION, POWDER, LYOPHILIZED, FOR SOLUTION INTRAVENOUS at 23:09

## 2022-12-01 RX ADMIN — SODIUM CHLORIDE 30 MILLILITER(S): 9 INJECTION INTRAMUSCULAR; INTRAVENOUS; SUBCUTANEOUS at 18:54

## 2022-12-01 RX ADMIN — ALTEPLASE 10 MILLIGRAM(S): KIT at 08:45

## 2022-12-01 RX ADMIN — BUDESONIDE AND FORMOTEROL FUMARATE DIHYDRATE 2 PUFF(S): 160; 4.5 AEROSOL RESPIRATORY (INHALATION) at 08:37

## 2022-12-01 RX ADMIN — DORNASE ALFA 5 MILLIGRAM(S): 1 SOLUTION RESPIRATORY (INHALATION) at 08:45

## 2022-12-01 RX ADMIN — Medication 650 MILLIGRAM(S): at 21:46

## 2022-12-01 RX ADMIN — BUDESONIDE AND FORMOTEROL FUMARATE DIHYDRATE 2 PUFF(S): 160; 4.5 AEROSOL RESPIRATORY (INHALATION) at 18:46

## 2022-12-01 RX ADMIN — Medication 650 MILLIGRAM(S): at 22:05

## 2022-12-01 RX ADMIN — Medication 650 MILLIGRAM(S): at 04:26

## 2022-12-01 RX ADMIN — SODIUM CHLORIDE 30 MILLILITER(S): 9 INJECTION INTRAMUSCULAR; INTRAVENOUS; SUBCUTANEOUS at 08:46

## 2022-12-01 RX ADMIN — Medication 3 MILLIGRAM(S): at 02:02

## 2022-12-01 RX ADMIN — PIPERACILLIN AND TAZOBACTAM 25 GRAM(S): 4; .5 INJECTION, POWDER, LYOPHILIZED, FOR SOLUTION INTRAVENOUS at 07:13

## 2022-12-01 RX ADMIN — DORNASE ALFA 5 MILLIGRAM(S): 1 SOLUTION RESPIRATORY (INHALATION) at 18:54

## 2022-12-01 RX ADMIN — PIPERACILLIN AND TAZOBACTAM 25 GRAM(S): 4; .5 INJECTION, POWDER, LYOPHILIZED, FOR SOLUTION INTRAVENOUS at 15:14

## 2022-12-01 RX ADMIN — ENOXAPARIN SODIUM 40 MILLIGRAM(S): 100 INJECTION SUBCUTANEOUS at 12:33

## 2022-12-01 RX ADMIN — ALTEPLASE 10 MILLIGRAM(S): KIT at 18:53

## 2022-12-01 RX ADMIN — Medication 650 MILLIGRAM(S): at 04:54

## 2022-12-01 RX ADMIN — LIDOCAINE 1 PATCH: 4 CREAM TOPICAL at 23:10

## 2022-12-01 NOTE — DISCHARGE NOTE PROVIDER - NSDCCPTREATMENT_GEN_ALL_CORE_FT
PRINCIPAL PROCEDURE  Procedure: CT abdomen and pelvis  Findings and Treatment: FINDINGS:  LOWER CHEST: Slightly elevated left hemidiaphragm. Small left pleural   effusion with pigtail catheter in situ, with small loculated fissural   component. Unchanged left basilar consolidation.  LIVER: Enlarged right lobe spanning 21.8 cm. No masses.  BILE DUCTS: Normal caliber.  GALLBLADDER: Cholecystectomy.  SPLEEN: Atrophic.  PANCREAS: Within normal limits.  ADRENALS: Mild nodular left adrenal gland thickening.  KIDNEYS/URETERS: Within normal limits.  BLADDER: Within normal limits.  REPRODUCTIVE ORGANS: Prominent retroverted uterus 10 cm circumscribed   heterogenous lesion enhancing lesion in lower uterine segment, may   possibly submucosal leiomyoma or endometrial mass. Visible endometrial   stripe not effaced by mass superiorly measures up to 1.5 cm with   inhomogenous low-density.  BOWEL: Moderate hiatal hernia. Scattered colonic diverticula. No bowel   obstruction. Appendix is normal.  PERITONEUM: No ascites.  VESSELS: Within normal limits.  RETROPERITONEUM/LYMPH NODES: No lymphadenopathy.  ABDOMINAL WALL: Moderate right inguinal hernia containing only fat.  BONES: Degenerative changes.  IMPRESSION:  Endometrial tumor, polyp or submucosal fibroid. Recommend pelvic   ultrasound correlation.  Unchanged left basilar pneumonia. Small residual pleural fluid including   anterior and fissural loculated components, with resolved basilar   pneumothorax.  Enlarged liver.        SECONDARY PROCEDURE  Procedure: CT chest w con  Findings and Treatment: LUNGS AND AIRWAYS: Large opacity at the left lung base with air   bronchograms. Mild right basilar atelectasis. Patent central airways.  PLEURA: Small left pneumothorax with a small amount of debris and/or   pleural fluid in the posterior left lung base. Status post pigtail chest   tube placement at the posterior left lower thorax. Small amount of   loculated fluid in the left major fissure.  MEDIASTINUM AND BANG: Increased number of prominent lymph nodes   throughout the mediastinum. Small pocket of gas along the border of left   upper esophagus above the thyroid, most likely a Kimo Robert   diverticulum.  VESSELS: Within normal limits.  HEART: Heart size is normal. Small apical pericardial effusion.  CHEST WALL AND LOWER NECK: Within normal limits.  VISUALIZED UPPER ABDOMEN: Status post cholecystectomy. Diminutive spleen.   Colonic diverticulosis.  BONES: Mild degenerative changes of the spine.  IMPRESSION:  1. Large left basilar consolidation. Small amount of loculated fluid in   the left major fissure.  2. Small left pneumothorax with a smallamount of debris and/or pleural   fluid in the posterior left lung base. Status post left pigtail chest   tube placement.  3. Mediastinal lymphadenopathy, most likely reactive.  4. Small apical pericardial effusion.      Procedure: US pelvis limited  Findings and Treatment: FINDINGS:  Uterus: 9.8 cm x 6.6 cm x 6.4 cm. 4.1 x 3.9 x 4 cm encapsulated   submucosal lesion, likely a fibroid.  Endometrium: 17 mm. Diffusely heterogeneous with cystic changes, no   significant vascularity.  Right ovary: 2.5 cm x 1.2 cm x 1.4 cm. Within normal limits. Normal   arterial and venous waveforms.  Left ovary: 2.4 cm x 1.3 cm x 1.5 cm. Within normal limits. Normal   arterial and venous waveforms.  Fluid: None.  IMPRESSION:  4 cm submucosal uterine lesion, favor fibroid, although comparison with   prior imaging for interval growth to exclude leiomyosarcoma.  Thickened heterogeneous endometrium with cystic changes, the differential   includes hyperplasia, polyp and endometrial neoplasm, also correlate with   a history of hormone therapy.

## 2022-12-01 NOTE — DISCHARGE NOTE PROVIDER - NSDCFUADDAPPT_GEN_ALL_CORE_FT
Please go to your follow up appointment with your gastroenterology doctor (Dr. Ivy) on 3pm on 1/3/2023.     Please schedule a follow up appointment with your hematology/oncology doctor Dr. Okeefe within 2 weeks of leaving the hospital. Please call her office at [phone] to schedule the appointment.     Please schedule a follow up appointment with a women's health doctor within 2 weeks of leaving the hospital. Please call [phone] to schedule the appointment.  Please go to your follow up appointment with your gastroenterology doctor (Dr. Ivy) on 3pm on 1/3/2023.     Please schedule a follow up appointment with your hematology/oncology/blood doctor (Dr. Okeefe) within 2 weeks of leaving the hospital. Please call her office at [phone] to schedule the appointment.     Please schedule a follow up appointment with a women's health doctor within 2 weeks of leaving the hospital. Please call [phone] to schedule the appointment.  Please go to your follow up appointment with your gastroenterology doctor (Dr. Ivy) on 3pm on 1/3/2023.     Please schedule a follow up appointment with your hematology/oncology/blood doctor (Dr. Okeefe) within 2 weeks of leaving the hospital. Please call her office at [phone] to schedule the appointment.     Please schedule a follow up appointment with Dr. Glez, our chief of breast surgery, within 2 weeks of leaving the hospital. Please call [phone] to make the appointment.     Please schedule a follow up appointment with a women's health doctor within 2 weeks of leaving the hospital. Please call [phone] to schedule the appointment if you would like to see one of our women's health doctors.

## 2022-12-01 NOTE — CHART NOTE - NSCHARTNOTEFT_GEN_A_CORE
Admitting Diagnosis:   Patient is a 62y old  Female who presents with a chief complaint of productive cough and weakness (2022 09:26)    PAST MEDICAL & SURGICAL HISTORY:  Prediabetes    Diverticulosis    Crohn&#x27;s disease    History of cholecystectomy    S/P parathyroidectomy    Benign cyst of breast    Current Nutrition Order:  regular  Kosher    PO Intake: Good (%) [   ]  Fair (50-75%) [ x ] Poor (<25%) [   ]    GI Issues:   + constipation, last BM     Pain:  No pain noted    Skin Integrity:  No edema    Labs:       135  |  100  |  6<L>  ----------------------------<  180<H>  3.8   |  26  |  0.63    Ca    8.4      01 Dec 2022 08:04  Phos  2.3       Mg     2.2         TPro  6.7  /  Alb  3.0<L>  /  TBili  0.3  /  DBili  x   /  AST  24  /  ALT  49<H>  /  AlkPhos  136<H>  11-30    CAPILLARY BLOOD GLUCOSE    Medications:  MEDICATIONS  (STANDING):  alteplase  Injectable for Pleural Effusion 10 milliGRAM(s) IntraPleural. every 12 hours  budesonide 160 MICROgram(s)/formoterol 4.5 MICROgram(s) Inhaler 2 Puff(s) Inhalation two times a day  dornase dedra Solution for Pleural Effusion 5 milliGRAM(s) IntraPleural. every 12 hours  enoxaparin Injectable 40 milliGRAM(s) SubCutaneous every 24 hours  piperacillin/tazobactam IVPB.. 3.375 Gram(s) IV Intermittent every 8 hours  sodium chloride 0.9% Solution for Pleural Effusion 30 milliLiter(s) IntraPleural. every 12 hours    MEDICATIONS  (PRN):  acetaminophen     Tablet .. 650 milliGRAM(s) Oral every 6 hours PRN Temp greater or equal to 38C (100.4F), Mild Pain (1 - 3)  guaiFENesin Oral Liquid (Sugar-Free) 100 milliGRAM(s) Oral every 8 hours PRN Cough  melatonin 3 milliGRAM(s) Oral at bedtime PRN Insomnia    Height for BMI (FEET)	5 Feet  Height for BMI (INCHES)	9 Inch(s)  Height for BMI (CENTIMETERS)	175.26 Centimeter(s)  Weight for BMI (lbs)	191 lb  Weight for BMI (kg)	86.6 kg  Body Mass Index	28.1    Weight Change: Pt reports UBW 186lbs, current wt 191lbs reflects 5lb fluctuation.     Estimated energy needs:   IBW used for calculations as pt >120% of IBW (131%), adjusted for age  25-30kcal/k-1971kcal  1-1.2g/k-79gprotein  Fluids per team    Subjective:   63 y/o F with PMHx of Crohn's, diverticulosis, asthma, and benign breast cyst (in ) presented with acute on chronic cough, weakness, dyspnea, and chest pain x 1 week, CXR with L pleural effusion, found to meet SIRS criteria, likely 2/2 empyema and L pleural effusion. s/p chest tube placement, and bronchoscopy . Pt with reported good appetite at baseline, decreased slightly in the week PTA. Started on regular kosher diet , no reports of intolerance. Will continue to monitor PO intake. Please see full nutrition recommendations below. Will continue to follow per RD protocol.     Previous Nutrition Diagnosis: Inadequate oral intake RT PO <EER AEB NPO    Active [ ]  Resolved [ x  ]    If resolved, new PES: No nutrition diagnosis at present     Recommendations:  1. Continue regular, kosher diet  2. Consider bowel regimen  3. Honor food preferences as able  4. RD to remain available prn    Education: Encouraged PO intake    Risk Level: High [   ] Moderate [ x ] Low [   ]

## 2022-12-01 NOTE — SWALLOW BEDSIDE ASSESSMENT ADULT - SLP GENERAL OBSERVATIONS
Patient awake and alert sitting upright in chair. Pt cognitively intact- able to provide accurate recent and past medical hx,. follow multi step commands, and participate in complex conversation. Patient awake and alert sitting upright in chair with 3L O2 via NC. Pt cognitively intact- able to provide accurate recent and past medical hx,. follow multi step commands, and participate in complex conversation.

## 2022-12-01 NOTE — SWALLOW BEDSIDE ASSESSMENT ADULT - ORAL PHASE
Adequate bolus management and transport of liquids and kole cracker. Pt expectorated skin of grape, reporting "this is going to get stuck"./Within functional limits

## 2022-12-01 NOTE — SWALLOW BEDSIDE ASSESSMENT ADULT - SWALLOW EVAL: RECOMMENDED DIET
Regular/thin. Consider full liquid diet if more comfortable/preferred by patient. Continue current diet rec pending MBS. Consider full liquid diet if more comfortable/preferred by patient.

## 2022-12-01 NOTE — DISCHARGE NOTE PROVIDER - NSDCMRMEDTOKEN_GEN_ALL_CORE_FT
Symbicort 160 mcg-4.5 mcg/inh inhalation aerosol: 2 puff(s) inhaled 2 times a day   amoxicillin-clavulanate 875 mg-125 mg oral tablet: 1 tab(s) orally 2 times a day   Symbicort 160 mcg-4.5 mcg/inh inhalation aerosol: 2 puff(s) inhaled 2 times a day

## 2022-12-01 NOTE — PROGRESS NOTE ADULT - TIME BILLING
KHADIJAH pneumonia, parapneumonic effusion, BAL cx GS with GPC in pairs--culture results pending. To f/u organism identification, CTC to help determine final choice/duration of antibiotic therapy.

## 2022-12-01 NOTE — PROGRESS NOTE ADULT - SUBJECTIVE AND OBJECTIVE BOX
INFECTIOUS DISEASES CONSULT FOLLOW-UP NOTE    INTERVAL HPI/OVERNIGHT EVENTS: No overnight event.    Subjective: Patient seen and examined at bedside. Feels okay and without complaints at this time. Denies fevers, chills, cough, abdominal pain, diarrhea.      ANTIBIOTICS/RELEVANT:    MEDICATIONS  (STANDING):  alteplase  Injectable for Pleural Effusion 10 milliGRAM(s) IntraPleural. every 12 hours  budesonide 160 MICROgram(s)/formoterol 4.5 MICROgram(s) Inhaler 2 Puff(s) Inhalation two times a day  dornase dedra Solution for Pleural Effusion 5 milliGRAM(s) IntraPleural. every 12 hours  enoxaparin Injectable 40 milliGRAM(s) SubCutaneous every 24 hours  piperacillin/tazobactam IVPB.. 3.375 Gram(s) IV Intermittent every 8 hours  sodium chloride 0.9% Solution for Pleural Effusion 30 milliLiter(s) IntraPleural. every 12 hours    MEDICATIONS  (PRN):  acetaminophen     Tablet .. 650 milliGRAM(s) Oral every 6 hours PRN Temp greater or equal to 38C (100.4F), Mild Pain (1 - 3)  guaiFENesin Oral Liquid (Sugar-Free) 100 milliGRAM(s) Oral every 8 hours PRN Cough  melatonin 3 milliGRAM(s) Oral at bedtime PRN Insomnia        Vital Signs Last 24 Hrs  T(C): 36.8 (01 Dec 2022 06:06), Max: 36.8 (01 Dec 2022 06:06)  T(F): 98.3 (01 Dec 2022 06:06), Max: 98.3 (01 Dec 2022 06:06)  HR: 75 (01 Dec 2022 06:06) (71 - 75)  BP: 113/70 (01 Dec 2022 06:06) (108/73 - 113/70)  BP(mean): --  RR: 16 (01 Dec 2022 06:06) (15 - 16)  SpO2: 94% (01 Dec 2022 06:06) (94% - 96%)    Parameters below as of 01 Dec 2022 06:06    O2 Flow (L/min): 3      11-30-22 @ 07:01  -  12-01-22 @ 07:00  --------------------------------------------------------  IN: 75 mL / OUT: 720 mL / NET: -645 mL    12-01-22 @ 07:01  -  12-01-22 @ 12:53  --------------------------------------------------------  IN: 50 mL / OUT: 0 mL / NET: 50 mL        PHYSICAL EXAM  Constitutional: alert, NAD  Eyes: the sclera and conjunctiva were normal.   ENT: the ears and nose were normal in appearance.   Neck: the appearance of the neck was normal and the neck was supple.   Pulmonary: no respiratory distress and lungs CTA bilaterally, chest tube without erythema or tenderness, c/d/i  Heart: heart rate was normal and rhythm regular, normal S1 and S2  Vascular: no peripheral edema  Abdomen: normal bowel sounds, soft, non-tender  Neurological: no focal deficits  Psychiatric: the affect was normal  Skin: left PIV site with significant erythema and tenderness      LABS:                        11.2   11.87 )-----------( 764      ( 01 Dec 2022 08:04 )             36.5     12-01    135  |  100  |  6<L>  ----------------------------<  180<H>  3.8   |  26  |  0.63    Ca    8.4      01 Dec 2022 08:04  Phos  2.3     12-01  Mg     2.2     12-01    TPro  6.7  /  Alb  3.0<L>  /  TBili  0.3  /  DBili  x   /  AST  24  /  ALT  49<H>  /  AlkPhos  136<H>  11-30    PT/INR - ( 01 Dec 2022 08:04 )   PT: 15.7 sec;   INR: 1.32          PTT - ( 01 Dec 2022 08:04 )  PTT:28.7 sec      MICROBIOLOGY:    Culture - Fungal, Bronchial (collected 30 Nov 2022 09:23)  Source: BAL FUNGAL-LEFT UPPER LOBE LINGULA BRONCHIAL-BAL-CULTU  Preliminary Report (01 Dec 2022 11:03):    Testing in progress    Culture - Bronchial (collected 30 Nov 2022 09:23)  Source: Lavage LEFT UPPER LOBE LINGULA BRONCHIAL-BAL-CULTURE  Gram Stain (30 Nov 2022 16:30):    No epithelial cells seen    Few WBC's    Rare Gram positive cocci in pairs  Preliminary Report (01 Dec 2022 09:57):    Culture in progress    Culture - Fungal, Body Fluid (collected 28 Nov 2022 18:28)  Source: Pleural Fl L pleural  Preliminary Report (30 Nov 2022 06:47):    Testing in progress    Culture - Acid Fast - Body Fluid w/Smear (collected 28 Nov 2022 18:28)  Source: Pleural Fl L pleural  Preliminary Report (30 Nov 2022 15:05):    Culture is being performed.    Culture - Body Fluid with Gram Stain (collected 28 Nov 2022 18:28)  Source: Pleural Fl L pleural  Gram Stain (28 Nov 2022 20:31):    No organisms seen    Rare WBC's  Preliminary Report (29 Nov 2022 09:10):    No growth to date        RADIOLOGY & ADDITIONAL STUDIES:  Reviewed

## 2022-12-01 NOTE — PROGRESS NOTE ADULT - PROBLEM SELECTOR PLAN 2
Pt with low-grade temperatures (Tmax 99F), acute on chronic cough, CP, dyspnea, and weakness x1 week. CXR with L pleural effusion. CT chest/abdomen at outside hospital on 11/23 with L pleural effusion and probable partially cystic mass at L lung base. Was diagnosed with empyema at outside hospital and sent home with Augmentin and azithromycin.  - c/w zosyn 3.375g q8h  - ID consulted, f/u recs

## 2022-12-01 NOTE — SWALLOW BEDSIDE ASSESSMENT ADULT - ESOPHAGEAL PHASE
Patient reports frequent globus sensation, the feeling of "bubbles in my chest", and occasional need to cough up and expectorate food. Recommend GI consult.

## 2022-12-01 NOTE — DISCHARGE NOTE PROVIDER - NSDCCPCAREPLAN_GEN_ALL_CORE_FT
PRINCIPAL DISCHARGE DIAGNOSIS  Diagnosis: Parapneumonic effusion  Assessment and Plan of Treatment:        PRINCIPAL DISCHARGE DIAGNOSIS  Diagnosis: Parapneumonic effusion  Assessment and Plan of Treatment: The symptoms that you experienced while first coming into the hospital were likely due to your pneumonia, or lung infection. We performed some lab tests and imaging studies to assess your infection and found a collection of fluid known as a parapneumonic effusion. A parapneumonic effusion refers to the accumulation of fluid in the pleural space in the setting of an adjacent pneumonia. This is why we placed a chest tube while you were in the hospital, to drain out this collected fluid for your comfort and also to remove the infected material in your lungs. We drained out this fluid and removed the chest tube while you were still in the patient. We would like to continue your anti-infection medication while you are outside of he hospital as well.      SECONDARY DISCHARGE DIAGNOSES  Diagnosis: Uterine mass  Assessment and Plan of Treatment: While we scanned your abdomen to see if there was a collection of fluid there to assess if it was contributing to the fluid collection in your lungs, we noticed some findings in your uterus. Evidence for a n"Endometrial tumor, polyp or submucosal fibroid" was found. This warrants further evaluation by a womens health doctor outside the hospital.     PRINCIPAL DISCHARGE DIAGNOSIS  Diagnosis: Parapneumonic effusion  Assessment and Plan of Treatment: The symptoms that you experienced while first coming into the hospital were likely due to your pneumonia, or lung infection. We performed some lab tests and imaging studies to assess your infection and found a collection of fluid known as a parapneumonic effusion. A parapneumonic effusion refers to the accumulation of fluid in the pleural space in the setting of an adjacent pneumonia. This is why we placed a chest tube while you were in the hospital, to drain out this collected fluid for your comfort and also to remove the infected material in your lungs. We drained out this fluid and removed the chest tube while you were still in the patient. We would like to continue your anti-infection medication while you are outside of he hospital as well. We are going to have you take Augmentin 875/125 every 12 hours from 12/10/22 to 12/14/22.      SECONDARY DISCHARGE DIAGNOSES  Diagnosis: Uterine mass  Assessment and Plan of Treatment: While we scanned your abdomen to see if there was a collection of fluid there to assess if it was contributing to the fluid collection in your lungs, we noticed some findings in your uterus. Evidence for an "Endometrial tumor, polyp or submucosal fibroid" was found. This warrants further evaluation by a womens health doctor outside the hospital.

## 2022-12-01 NOTE — PROGRESS NOTE ADULT - SUBJECTIVE AND OBJECTIVE BOX
OVERNIGHT EVENTS: LAURENCE    SUBJECTIVE:  Patient seen and examined at bedside.  ROS: Patient denies h/n/v/d, fever, chills, cp, palpitations, sob, abd pain, leg swelling, rashes, dysuria, and changes in BM.     Vital Signs Last 12 Hrs  T(F): 98.3 (12-01-22 @ 06:06), Max: 98.3 (12-01-22 @ 06:06)  HR: 75 (12-01-22 @ 06:06) (75 - 75)  BP: 113/70 (12-01-22 @ 06:06) (113/70 - 113/70)  BP(mean): --  RR: 16 (12-01-22 @ 06:06) (16 - 16)  SpO2: 94% (12-01-22 @ 06:06) (94% - 94%)  I&O's Summary    30 Nov 2022 07:01  -  01 Dec 2022 07:00  --------------------------------------------------------  IN: 75 mL / OUT: 720 mL / NET: -645 mL    01 Dec 2022 07:01  -  01 Dec 2022 13:26  --------------------------------------------------------  IN: 50 mL / OUT: 0 mL / NET: 50 mL        PHYSICAL EXAM:  Constitutional: NAD, comfortable in bed.  HEENT: NC/AT, PERRLA, EOMI, no conjunctival pallor or scleral icterus, MMM  Neck: Supple, no JVD  Respiratory: Decreased breath sounds L lung. Chest tube in place. site clean.  Cardiovascular: RRR, normal S1 and S2, no m/r/g.   Gastrointestinal: +BS, soft NTND, no guarding or rebound tenderness, no palpable masses   Extremities: wwp; no cyanosis, clubbing or edema.   Vascular: Pulses equal and strong throughout.   Neurological: AAOx3, no CN deficits, strength and sensation intact throughout.   Skin: No gross skin abnormalities or rashes        LABS:                        11.2   11.87 )-----------( 764      ( 01 Dec 2022 08:04 )             36.5     12-01    135  |  100  |  6<L>  ----------------------------<  180<H>  3.8   |  26  |  0.63    Ca    8.4      01 Dec 2022 08:04  Phos  2.3     12-01  Mg     2.2     12-01    TPro  6.7  /  Alb  3.0<L>  /  TBili  0.3  /  DBili  x   /  AST  24  /  ALT  49<H>  /  AlkPhos  136<H>  11-30    PT/INR - ( 01 Dec 2022 08:04 )   PT: 15.7 sec;   INR: 1.32          PTT - ( 01 Dec 2022 08:04 )  PTT:28.7 sec        RADIOLOGY & ADDITIONAL TESTS:    MEDICATIONS  (STANDING):  alteplase  Injectable for Pleural Effusion 10 milliGRAM(s) IntraPleural. every 12 hours  budesonide 160 MICROgram(s)/formoterol 4.5 MICROgram(s) Inhaler 2 Puff(s) Inhalation two times a day  dornase dedra Solution for Pleural Effusion 5 milliGRAM(s) IntraPleural. every 12 hours  enoxaparin Injectable 40 milliGRAM(s) SubCutaneous every 24 hours  piperacillin/tazobactam IVPB.. 3.375 Gram(s) IV Intermittent every 8 hours  sodium chloride 0.9% Solution for Pleural Effusion 30 milliLiter(s) IntraPleural. every 12 hours    MEDICATIONS  (PRN):  acetaminophen     Tablet .. 650 milliGRAM(s) Oral every 6 hours PRN Temp greater or equal to 38C (100.4F), Mild Pain (1 - 3)  guaiFENesin Oral Liquid (Sugar-Free) 100 milliGRAM(s) Oral every 8 hours PRN Cough  melatonin 3 milliGRAM(s) Oral at bedtime PRN Insomnia

## 2022-12-01 NOTE — PROGRESS NOTE ADULT - ASSESSMENT
61 y/o F with PMHx of Crohn's, diverticulosis, asthma, and benign breast cyst (in 1989) presented with acute on chronic cough, weakness, dyspnea, and chest pain x 1 week, CXR with L pleural effusion, found to meet SIRS criteria, likely 2/2 empyema and L pleural effusion. s/p chest tube placement, & pending bronchoscopy 11/30.

## 2022-12-01 NOTE — PROGRESS NOTE ADULT - ASSESSMENT
61y/o female with PMHx of Crohn's, diverticulosis, asthma, and benign breast cyst (in 1989) presented with acute on chronic cough, chest pain, and weakness. Was previously seen at [facility] for chest pain and dyspnea where CT chest and abd/pelvis reportedly showed moderate left pleural effusion, ?cystic mass of left lung base and was dx with empyema and discharged with augmentin and azithromycin for which patient took two days of (also admitted to being given one dose of meropenem by a paramedic family member via IV), started feeling ill with worsened weakness and presented to [facility]. While at [facility], patient was started on zosyn with ultrasound revealing KHADIJAH PNA loculated effusion for which a chest tube was placed with exudative output with no growth to date. s/p bronchoscopy.    Patient's father had history of heavy smoking leading to possible second hand smoke exposure to patient as well as history of benign breast cyst, raising possibility of differential dx of malignancy which can be an etiology for patient's exudative effusion. Differential diagnosis includes malignancy effusion vs parapneumonic effusion vs KHADIJAH PNA. Left upper lobe BAL with gram positive cocci pairs, awaiting speciation. Unclear if empyema given no purulence, normal pH.    Plan:  - c/w zosyn 3.375g q8h  - f/u BAL, sputum culture and grain stain, cytopathology, aspergillus galactomanan, fungitell, pleural fluid  - f/u repeat CT chest  - Exchange left PIV    ID team 1 will continue to follow.

## 2022-12-01 NOTE — SWALLOW BEDSIDE ASSESSMENT ADULT - SLP PERTINENT HISTORY OF CURRENT PROBLEM
61 y/o F with PMHx of Crohn's, diverticulosis, asthma, and benign breast cyst (in 1989) presented with acute on chronic cough, weakness, dyspnea, and chest pain x 1 week, CXR with L pleural effusion, found to meet SIRS criteria, likely 2/2 empyema and L pleural effusion. KHADIJAH PNA

## 2022-12-01 NOTE — DISCHARGE NOTE PROVIDER - HOSPITAL COURSE
63y/o female with PMHx of Crohn's, diverticulosis, asthma, and benign breast cyst (in 1989) presented with acute on chronic cough, chest pain, and weakness. Found to have L pleural effusion. Met SIRS criteria 2/2 PNA with empyema - on Zosyn. S/p chest tube placement with fluid studies consistent with exudative effusion. Bronchoscopy performed 11/30. CT chest performed 1 day after bronchoscopy demonstrated:  Large left basilar consolidation. Small amount of loculated fluid in   the left major fissure.  2. Small left pneumothorax with a smallamount of debris and/or pleural   fluid in the posterior left lung base. Status post left pigtail chest   tube placement.  3. Mediastinal lymphadenopathy, most likely reactive.  4. Small apical pericardial effusion.    Problem List/Main Diagnoses (system-based):    Problem/Plan - 1:  ·  Problem: SIRS (systemic inflammatory response syndrome).   ·  Plan: Pt met 3/4 SIRS criteria on admission: HR 95, RR 22, WBC 19k. Pt with low-grade temperatures (Tmax 99F), acute on chronic cough, CP, dyspnea, and weakness x1 week. CXR with L pleural effusion. CT chest/abdomen at outside hospital on 11/23 with L pleural effusion and probable partially cystic mass at L lung base. Sx likely secondary to empyema. s/p zosyn 3.375 x1 in ED.   Ur legionella neg    Plan:   - Bronch performed 11/30 AM  - c/w zosyn 3.375g q6h  - C/w robitussin 100mg q8 for cough  - BCx NGTD  - F/u MRSA swab, RVP.     Problem/Plan - 2:  ·  Problem: Empyema.   ·  Plan: Pt with low-grade temperatures (Tmax 99F), acute on chronic cough, CP, dyspnea, and weakness x1 week. CXR with L pleural effusion. CT chest/abdomen at outside hospital on 11/23 with L pleural effusion and probable partially cystic mass at L lung base. Was diagnosed with empyema at outside hospital and sent home with Augmentin and azithromycin. At [facility] pt diagnosed with complex parapneumonic effusion. Abx on discharge ____________________________       Problem/Plan - 3:  ·  Problem: Pleural effusion, left.   ·  Plan: Pt with low-grade temperatures (Tmax 99F), acute on chronic cough, CP, dyspnea, and weakness x1 week. CXR with L pleural effusion. CT chest/abdomen at outside hospital on 11/23 with L pleural effusion and probable partially cystic mass at L lung base. s/p chest tube placement. fluid studies consistent with exudative effusion. Bronchoscopy performed.        Problem/Plan - 4:  ·  Problem: Breast mass, right.   ·  Plan: CT chest/abdomen at outside hospital with retroareolar mass. Per patient, last mammogram ~4 years ago, nml.   - F/u outpt for further workup.     Problem/Plan - 5:  ·  Problem: Crohn's disease.   ·  Plan: Pt recently diagnosed with Crohn's disease on outpt colonoscopy (7/26/2022). Pt asymptomatic currently, not on any meds.  - F/u outpt.     Problem/Plan - 6:  ·  Problem: Diverticulosis.   ·  Plan: CT chest/abdomen on 11/23 at outside hospital with colonic diverticulosis. Pt asymptomatic currently.   - F/u outpt.    New medications:                    63y/o female with PMHx of Crohn's, diverticulosis, asthma, and benign breast cyst (in 1989) presented with acute on chronic cough, chest pain, and weakness. Found to have L pleural effusion. Met SIRS criteria 2/2 PNA with empyema - on Zosyn. S/p chest tube placement with fluid studies consistent with exudative effusion. Bronchoscopy performed 11/30. CT chest performed 1 day after bronchoscopy demonstrated:  Large left basilar consolidation. Small amount of loculated fluid in   the left major fissure.  2. Small left pneumothorax with a smallamount of debris and/or pleural   fluid in the posterior left lung base. Status post left pigtail chest   tube placement.  3. Mediastinal lymphadenopathy, most likely reactive.  4. Small apical pericardial effusion.    Problem List/Main Diagnoses (system-based):    Problem/Plan - 1:  ·  Problem: SIRS (systemic inflammatory response syndrome).   ·  Plan: Pt met 3/4 SIRS criteria on admission: HR 95, RR 22, WBC 19k. Pt with low-grade temperatures (Tmax 99F), acute on chronic cough, CP, dyspnea, and weakness x1 week. CXR with L pleural effusion. CT chest/abdomen at outside hospital on 11/23 with L pleural effusion and probable partially cystic mass at L lung base. Sx likely secondary to empyema. Ur legionella neg    Plan:   - Bronch performed 11/30 AM  - zosyn 3.375g q6h while inpt  - robitussin 100mg q8 for cough  - BCx NGTD     Problem/Plan - 2:  ·  Problem: Empyema.   ·  Plan: Pt with low-grade temperatures (Tmax 99F), acute on chronic cough, CP, dyspnea, and weakness x1 week. CXR with L pleural effusion. CT chest/abdomen at outside hospital on 11/23 with L pleural effusion and probable partially cystic mass at L lung base. Was diagnosed with empyema at outside hospital and sent home with Augmentin and azithromycin. At [facility] pt diagnosed with complex parapneumonic effusion. Abx on discharge ____________________________       Problem/Plan - 3:  ·  Problem: Pleural effusion, left.   ·  Plan: Pt with low-grade temperatures (Tmax 99F), acute on chronic cough, CP, dyspnea, and weakness x1 week. CXR with L pleural effusion. CT chest/abdomen at outside hospital on 11/23 with L pleural effusion and probable partially cystic mass at L lung base. s/p chest tube placement. fluid studies consistent with exudative effusion. Bronchoscopy performed. Chest tube placed and removed over the course of this admission.        Problem/Plan - 4:  ·  Problem: Breast mass, right.   ·  Plan: CT chest/abdomen at outside hospital with retroareolar mass. Per patient, last mammogram ~4 years ago, nml.   - F/u outpt for further workup.     Problem/Plan - 5:  ·  Problem: Crohn's disease.   ·  Plan: Pt recently diagnosed with Crohn's disease on outpt colonoscopy (7/26/2022). Pt asymptomatic currently, not on any meds.  - F/u outpt.     Problem/Plan - 6:  ·  Problem: Diverticulosis.   ·  Plan: CT chest/abdomen on 11/23 at outside hospital with colonic diverticulosis. Pt asymptomatic currently.   - F/u outpt.    New medications:                    61y/o female with PMHx of Crohn's, diverticulosis, asthma, and benign breast cyst (in 1989) presented with acute on chronic cough, chest pain, and weakness. Found to have L pleural effusion. Met SIRS criteria 2/2 PNA with empyema - on Zosyn. S/p chest tube placement with fluid studies consistent with exudative effusion. Bronchoscopy performed 11/30. CT chest performed 1 day after bronchoscopy demonstrated:  Large left basilar consolidation. Small amount of loculated fluid in   the left major fissure.  2. Small left pneumothorax with a smallamount of debris and/or pleural   fluid in the posterior left lung base. Status post left pigtail chest   tube placement.  3. Mediastinal lymphadenopathy, most likely reactive.  4. Small apical pericardial effusion.    Problem List/Main Diagnoses (system-based):    Problem/Plan - 1:  ·  Problem: SIRS (systemic inflammatory response syndrome).   ·  Plan: Pt met 3/4 SIRS criteria on admission: HR 95, RR 22, WBC 19k. Pt with low-grade temperatures (Tmax 99F), acute on chronic cough, CP, dyspnea, and weakness x1 week. CXR with L pleural effusion. CT chest/abdomen at outside hospital on 11/23 with L pleural effusion and probable partially cystic mass at L lung base. Sx likely secondary to empyema. Ur legionella neg  Plan:   - Bronch performed 11/30 AM  - zosyn 3.375g q6h while inpt  - robitussin 100mg q8 for cough  - BCx NGTD     Problem/Plan - 2:  ·  Problem: Empyema.   ·  Plan: Pt with low-grade temperatures (Tmax 99F), acute on chronic cough, CP, dyspnea, and weakness x1 week. CXR with L pleural effusion. CT chest/abdomen at outside hospital on 11/23 with L pleural effusion and probable partially cystic mass at L lung base. Was diagnosed with empyema at outside hospital and sent home with Augmentin and azithromycin. At [facility] pt diagnosed with complex parapneumonic effusion. Abx on discharge ____________________________       Problem/Plan - 3:  ·  Problem: Pleural effusion, left.   ·  Plan: Pt with low-grade temperatures (Tmax 99F), acute on chronic cough, CP, dyspnea, and weakness x1 week. CXR with L pleural effusion. CT chest/abdomen at outside hospital on 11/23 with L pleural effusion and probable partially cystic mass at L lung base. s/p chest tube placement. fluid studies consistent with exudative effusion. Bronchoscopy performed. Chest tube placed and removed over the course of this admission.        Problem/Plan - 4:  ·  Problem: Breast mass, right.   ·  Plan: CT chest/abdomen at outside hospital with retroareolar mass. Per patient, last mammogram ~4 years ago, nml.   - F/u outpt for further workup.     Problem/Plan - 5:  ·  Problem: Crohn's disease.   ·  Plan: Pt recently diagnosed with Crohn's disease on outpt colonoscopy (7/26/2022). Pt asymptomatic currently, not on any meds.  - F/u outpt.     Problem/Plan - 6:  ·  Problem: Diverticulosis.   ·  Plan: CT chest/abdomen on 11/23 at outside hospital with colonic diverticulosis. Pt asymptomatic currently.   - F/u outpt.    New medications:                    63y/o female with PMHx of Crohn's, diverticulosis, asthma, and benign breast cyst (in 1989) presented with acute on chronic cough, chest pain, and weakness. Found to have L pleural effusion. Met SIRS criteria 2/2 PNA with empyema - on Zosyn. S/p chest tube placement with fluid studies consistent with exudative effusion. Bronchoscopy performed 11/30. CT chest performed 1 day after bronchoscopy demonstrated:  Large left basilar consolidation. Small amount of loculated fluid in   the left major fissure.  2. Small left pneumothorax with a smallamount of debris and/or pleural   fluid in the posterior left lung base. Status post left pigtail chest   tube placement.  3. Mediastinal lymphadenopathy, most likely reactive.  4. Small apical pericardial effusion.    Problem List/Main Diagnoses (system-based):    Problem/Plan - 1:  ·  Problem: SIRS (systemic inflammatory response syndrome).   ·  Plan: Pt met 3/4 SIRS criteria on admission: HR 95, RR 22, WBC 19k. Pt with low-grade temperatures (Tmax 99F), acute on chronic cough, CP, dyspnea, and weakness x1 week. CXR with L pleural effusion. CT chest/abdomen at outside hospital on 11/23 with L pleural effusion and probable partially cystic mass at L lung base. Sx likely secondary to empyema. Ur legionella neg  Plan:   - Bronch performed 11/30 AM  - zosyn 3.375g q6h while inpt  - robitussin 100mg q8 for cough  - BCx NGTD     Problem/Plan - 2:  ·  Problem: Empyema.   ·  Plan: Pt with low-grade temperatures (Tmax 99F), acute on chronic cough, CP, dyspnea, and weakness x1 week. CXR with L pleural effusion. CT chest/abdomen at outside hospital on 11/23 with L pleural effusion and probable partially cystic mass at L lung base. Was diagnosed with empyema at outside hospital and sent home with Augmentin and azithromycin. At [facility] pt diagnosed with complex parapneumonic effusion. Abx on discharge Augmentin 875/125 from 12/10 to 12/14       Problem/Plan - 3:  ·  Problem: Pleural effusion, left.   ·  Plan: Pt with low-grade temperatures (Tmax 99F), acute on chronic cough, CP, dyspnea, and weakness x1 week. CXR with L pleural effusion. CT chest/abdomen at outside hospital on 11/23 with L pleural effusion and probable partially cystic mass at L lung base. s/p chest tube placement. fluid studies consistent with exudative effusion. Bronchoscopy performed. Chest tube placed and removed over the course of this admission.        Problem/Plan - 4:  ·  Problem: Breast mass, right.   ·  Plan: CT chest/abdomen at outside hospital with retroareolar mass. Per patient, last mammogram ~4 years ago, nml.   - F/u outpt for further workup.     Problem/Plan - 5:  ·  Problem: Crohn's disease.   ·  Plan: Pt recently diagnosed with Crohn's disease on outpt colonoscopy (7/26/2022). Pt asymptomatic currently, not on any meds.  - F/u outpt.     Problem/Plan - 6:  ·  Problem: Diverticulosis.   ·  Plan: CT chest/abdomen on 11/23 at outside hospital with colonic diverticulosis. Pt asymptomatic currently.   - F/u outpt.    New medications: Augmentin 875/125 from 12/10 to 12/14

## 2022-12-01 NOTE — SWALLOW BEDSIDE ASSESSMENT ADULT - SWALLOW EVAL: DIAGNOSIS
Recommend MBS in order to visualize Patient presents with concern for pharyngeal dysphagia given PNA dx, need for supplemental oxygen, Suspected pharyngeal and/or esophageal dysphagia given PNA, clinical sign of aspiration x1, and report of reflux-like symptoms. Recommend MBS in order to visualize pharyngoesophagus given concern for retroflow after the initial pharyngeal swallow, which increases risk for aspiration.

## 2022-12-01 NOTE — SWALLOW BEDSIDE ASSESSMENT ADULT - PHARYNGEAL PHASE
Pharyngeal phase significant for reduced hyolaryngeal movement per palpation and delayed throat clears x1 after swallow of regular solids./Delayed throat clear post oral intake

## 2022-12-01 NOTE — SWALLOW BEDSIDE ASSESSMENT ADULT - ASR SWALLOW ASPIRATION MONITOR
change of breathing pattern/oral hygiene/position upright (90Y)/cough/gurgly voice/pneumonia/throat clearing

## 2022-12-01 NOTE — PROGRESS NOTE ADULT - TIME BILLING
Patient seen and examined with house-staff during bedside rounds.  Resident note read, including vitals, physical findings, laboratory data, and radiological reports.   Revisions included below.  Direct personal management at bed side and extensive interpretation of the data.  Plan was outlined and discussed in details with the housestaff.  Decision making of high complexity  Action taken for acute disease activity to reflect the level of care provided:  - medication reconciliation  - review laboratory data  she is stable  drained 800  CT scan reviewed  continue antibiotic  follow on the culture  follow on cytology  Continue CT drainage

## 2022-12-02 PROBLEM — R73.03 PREDIABETES: Chronic | Status: ACTIVE | Noted: 2022-11-25

## 2022-12-02 PROBLEM — Z00.00 ENCOUNTER FOR PREVENTIVE HEALTH EXAMINATION: Status: ACTIVE | Noted: 2022-12-02

## 2022-12-02 PROBLEM — K57.90 DIVERTICULOSIS OF INTESTINE, PART UNSPECIFIED, WITHOUT PERFORATION OR ABSCESS WITHOUT BLEEDING: Chronic | Status: ACTIVE | Noted: 2022-11-25

## 2022-12-02 PROBLEM — K50.90 CROHN'S DISEASE, UNSPECIFIED, WITHOUT COMPLICATIONS: Chronic | Status: ACTIVE | Noted: 2022-11-25

## 2022-12-02 LAB
ALBUMIN SERPL ELPH-MCNC: 2.8 G/DL — LOW (ref 3.3–5)
ALP SERPL-CCNC: 109 U/L — SIGNIFICANT CHANGE UP (ref 40–120)
ALT FLD-CCNC: 47 U/L — HIGH (ref 10–45)
ANION GAP SERPL CALC-SCNC: 9 MMOL/L — SIGNIFICANT CHANGE UP (ref 5–17)
ANISOCYTOSIS BLD QL: SLIGHT — SIGNIFICANT CHANGE UP
AST SERPL-CCNC: 26 U/L — SIGNIFICANT CHANGE UP (ref 10–40)
BASOPHILS # BLD AUTO: 0 K/UL — SIGNIFICANT CHANGE UP (ref 0–0.2)
BASOPHILS NFR BLD AUTO: 0 % — SIGNIFICANT CHANGE UP (ref 0–2)
BILIRUB SERPL-MCNC: 0.2 MG/DL — SIGNIFICANT CHANGE UP (ref 0.2–1.2)
BUN SERPL-MCNC: 5 MG/DL — LOW (ref 7–23)
CALCIUM SERPL-MCNC: 8.5 MG/DL — SIGNIFICANT CHANGE UP (ref 8.4–10.5)
CHLORIDE SERPL-SCNC: 97 MMOL/L — SIGNIFICANT CHANGE UP (ref 96–108)
CO2 SERPL-SCNC: 27 MMOL/L — SIGNIFICANT CHANGE UP (ref 22–31)
CREAT SERPL-MCNC: 0.65 MG/DL — SIGNIFICANT CHANGE UP (ref 0.5–1.3)
CULTURE RESULTS: SIGNIFICANT CHANGE UP
EGFR: 101 ML/MIN/1.73M2 — SIGNIFICANT CHANGE UP
EOSINOPHIL # BLD AUTO: 0 K/UL — SIGNIFICANT CHANGE UP (ref 0–0.5)
EOSINOPHIL NFR BLD AUTO: 0 % — SIGNIFICANT CHANGE UP (ref 0–6)
FUNGITELL B-D-GLUCAN,  BRONCHIAL LAVAGE: SIGNIFICANT CHANGE UP
GIANT PLATELETS BLD QL SMEAR: PRESENT — SIGNIFICANT CHANGE UP
GLUCOSE SERPL-MCNC: 111 MG/DL — HIGH (ref 70–99)
HCT VFR BLD CALC: 37.6 % — SIGNIFICANT CHANGE UP (ref 34.5–45)
HGB BLD-MCNC: 11.7 G/DL — SIGNIFICANT CHANGE UP (ref 11.5–15.5)
HYPOCHROMIA BLD QL: SLIGHT — SIGNIFICANT CHANGE UP
LYMPHOCYTES # BLD AUTO: 17.4 % — SIGNIFICANT CHANGE UP (ref 13–44)
LYMPHOCYTES # BLD AUTO: 2.51 K/UL — SIGNIFICANT CHANGE UP (ref 1–3.3)
MAGNESIUM SERPL-MCNC: 2.1 MG/DL — SIGNIFICANT CHANGE UP (ref 1.6–2.6)
MANUAL SMEAR VERIFICATION: SIGNIFICANT CHANGE UP
MCHC RBC-ENTMCNC: 29.4 PG — SIGNIFICANT CHANGE UP (ref 27–34)
MCHC RBC-ENTMCNC: 31.1 GM/DL — LOW (ref 32–36)
MCV RBC AUTO: 94.5 FL — SIGNIFICANT CHANGE UP (ref 80–100)
METAMYELOCYTES # FLD: 0.9 % — HIGH (ref 0–0)
MONOCYTES # BLD AUTO: 0.88 K/UL — SIGNIFICANT CHANGE UP (ref 0–0.9)
MONOCYTES NFR BLD AUTO: 6.1 % — SIGNIFICANT CHANGE UP (ref 2–14)
NEUTROPHILS # BLD AUTO: 10.92 K/UL — HIGH (ref 1.8–7.4)
NEUTROPHILS NFR BLD AUTO: 73.9 % — SIGNIFICANT CHANGE UP (ref 43–77)
NEUTS BAND # BLD: 1.7 % — SIGNIFICANT CHANGE UP (ref 0–8)
NON-GYNECOLOGICAL CYTOLOGY STUDY: SIGNIFICANT CHANGE UP
NRBC # BLD: 2 /100 — HIGH (ref 0–0)
NRBC # BLD: SIGNIFICANT CHANGE UP /100 WBCS (ref 0–0)
OVALOCYTES BLD QL SMEAR: SLIGHT — SIGNIFICANT CHANGE UP
PHOSPHATE SERPL-MCNC: 2.4 MG/DL — LOW (ref 2.5–4.5)
PLAT MORPH BLD: ABNORMAL
PLATELET # BLD AUTO: 742 K/UL — HIGH (ref 150–400)
POIKILOCYTOSIS BLD QL AUTO: SLIGHT — SIGNIFICANT CHANGE UP
POLYCHROMASIA BLD QL SMEAR: SLIGHT — SIGNIFICANT CHANGE UP
POTASSIUM SERPL-MCNC: 4.8 MMOL/L — SIGNIFICANT CHANGE UP (ref 3.5–5.3)
POTASSIUM SERPL-SCNC: 4.8 MMOL/L — SIGNIFICANT CHANGE UP (ref 3.5–5.3)
PROT SERPL-MCNC: 6.6 G/DL — SIGNIFICANT CHANGE UP (ref 6–8.3)
RBC # BLD: 3.98 M/UL — SIGNIFICANT CHANGE UP (ref 3.8–5.2)
RBC # FLD: 13.6 % — SIGNIFICANT CHANGE UP (ref 10.3–14.5)
RBC BLD AUTO: ABNORMAL
SMUDGE CELLS # BLD: PRESENT — SIGNIFICANT CHANGE UP
SODIUM SERPL-SCNC: 133 MMOL/L — LOW (ref 135–145)
SPECIMEN SOURCE: SIGNIFICANT CHANGE UP
SPHEROCYTES BLD QL SMEAR: SLIGHT — SIGNIFICANT CHANGE UP
WBC # BLD: 14.44 K/UL — HIGH (ref 3.8–10.5)
WBC # FLD AUTO: 14.44 K/UL — HIGH (ref 3.8–10.5)

## 2022-12-02 PROCEDURE — 99232 SBSQ HOSP IP/OBS MODERATE 35: CPT

## 2022-12-02 PROCEDURE — 71045 X-RAY EXAM CHEST 1 VIEW: CPT | Mod: 26

## 2022-12-02 PROCEDURE — 74230 X-RAY XM SWLNG FUNCJ C+: CPT | Mod: 26

## 2022-12-02 PROCEDURE — 99232 SBSQ HOSP IP/OBS MODERATE 35: CPT | Mod: GC

## 2022-12-02 RX ORDER — IBUPROFEN 200 MG
400 TABLET ORAL ONCE
Refills: 0 | Status: COMPLETED | OUTPATIENT
Start: 2022-12-02 | End: 2022-12-02

## 2022-12-02 RX ORDER — LIDOCAINE 4 G/100G
1 CREAM TOPICAL ONCE
Refills: 0 | Status: COMPLETED | OUTPATIENT
Start: 2022-12-02 | End: 2022-12-02

## 2022-12-02 RX ORDER — AMPICILLIN SODIUM AND SULBACTAM SODIUM 250; 125 MG/ML; MG/ML
3 INJECTION, POWDER, FOR SUSPENSION INTRAMUSCULAR; INTRAVENOUS EVERY 6 HOURS
Refills: 0 | Status: COMPLETED | OUTPATIENT
Start: 2022-12-02 | End: 2022-12-08

## 2022-12-02 RX ADMIN — AMPICILLIN SODIUM AND SULBACTAM SODIUM 200 GRAM(S): 250; 125 INJECTION, POWDER, FOR SUSPENSION INTRAMUSCULAR; INTRAVENOUS at 20:03

## 2022-12-02 RX ADMIN — BUDESONIDE AND FORMOTEROL FUMARATE DIHYDRATE 2 PUFF(S): 160; 4.5 AEROSOL RESPIRATORY (INHALATION) at 06:37

## 2022-12-02 RX ADMIN — Medication 650 MILLIGRAM(S): at 14:20

## 2022-12-02 RX ADMIN — Medication 650 MILLIGRAM(S): at 13:49

## 2022-12-02 RX ADMIN — BUDESONIDE AND FORMOTEROL FUMARATE DIHYDRATE 2 PUFF(S): 160; 4.5 AEROSOL RESPIRATORY (INHALATION) at 18:31

## 2022-12-02 RX ADMIN — AMPICILLIN SODIUM AND SULBACTAM SODIUM 200 GRAM(S): 250; 125 INJECTION, POWDER, FOR SUSPENSION INTRAMUSCULAR; INTRAVENOUS at 14:25

## 2022-12-02 RX ADMIN — LIDOCAINE 1 PATCH: 4 CREAM TOPICAL at 23:25

## 2022-12-02 RX ADMIN — PIPERACILLIN AND TAZOBACTAM 25 GRAM(S): 4; .5 INJECTION, POWDER, LYOPHILIZED, FOR SOLUTION INTRAVENOUS at 07:24

## 2022-12-02 RX ADMIN — LIDOCAINE 1 PATCH: 4 CREAM TOPICAL at 06:30

## 2022-12-02 RX ADMIN — ENOXAPARIN SODIUM 40 MILLIGRAM(S): 100 INJECTION SUBCUTANEOUS at 12:08

## 2022-12-02 RX ADMIN — LIDOCAINE 1 PATCH: 4 CREAM TOPICAL at 12:06

## 2022-12-02 RX ADMIN — Medication 100 MILLIGRAM(S): at 18:30

## 2022-12-02 RX ADMIN — Medication 400 MILLIGRAM(S): at 19:00

## 2022-12-02 RX ADMIN — Medication 400 MILLIGRAM(S): at 18:31

## 2022-12-02 NOTE — SWALLOW VFSS/MBS ASSESSMENT ADULT - SPECIFY REASON(S)
To further assess pharyngoesophagus due to concern for pharyngeal and/or esophageal dysphagia. Concern for dysphagia 2/2 clinical exam as well as pt reports of globus sensation, feeling "bubbles in my chest", and intermittent need to vomit/cough up food To assess swallow physiology given concern for dysphagia 2/2 clinical exam as well as pt reports of globus sensation, feeling "bubbles in my chest", and intermittent need to vomit/cough up food

## 2022-12-02 NOTE — PROGRESS NOTE ADULT - ASSESSMENT
61 y/o F with PMHx of Crohn's, diverticulosis, asthma, and benign breast cyst (in 1989) presented with acute on chronic cough, weakness, dyspnea, and chest pain x 1 week, CXR with L pleural effusion, found to meet SIRS criteria, likely 2/2 empyema and L pleural effusion. s/p chest tube placement and bronchoscopy.

## 2022-12-02 NOTE — SWALLOW VFSS/MBS ASSESSMENT ADULT - DIAGNOSTIC IMPRESSIONS
Patient presents with a functional oropharyngeal swallow with no aspiration observed during swallow. While initial pharyngeal swallow is WFL, patient remains at significant for aspiration and its negative sequelae 2/2 visualized diverticulum and backflow of pooled contrast. Therefore, recommend full liquid diet with slow pace and subsequent swallows, in order to increase pharyngeal clearance. Patient presents with a functional oropharyngeal swallow with no aspiration observed during swallow. Pt’s complaints of vomiting and pharyngeal/esophageal stasis are c/w observations from this study, including backflow of bolus and pharyngeal stasis of backflowed material. Patient remains at risk for aspiration of backflowed material from the diverticulum. A secondary swallow was effective in clearing backflowed material. However the pt was not consistently sensate to the backflowed material and benefited from a cued secondary swallow. Recommend initiate full liquid diet (no purees or solids), with rest breaks between bites and secondary swallows. Pt p/w esophageal dysphagia c/b a large diverticulum in the cervical esophagus, which then backflowed  up to the level of the laryngeal vestibule. These observations are c/w pt’s reported dysphagia complaints. Although no haroldo aspiration of this backflowed material was noted during the study, given the (1) amount of backflowed contrast, (2) the level to which it backflowed (i.e., at level of entry to laryngeal vestibule), and (3) pt’s reduced sensation, there is risk of aspiration over the course of a meal. Given this, it is recommended that the pt start a modified diet of full liquids (with a secondary swallow) with aspiration/reflux precautions.  Given pt’s current PNA, risk for aspiration, and pt’s reported difficulty tolerating PO, consider ENT/GI consult for possible management of the diverticulum.

## 2022-12-02 NOTE — PROGRESS NOTE ADULT - TIME BILLING
Patient seen and examined with house-staff during bedside rounds.  Resident note read, including vitals, physical findings, laboratory data, and radiological reports.   Revisions included below.  Direct personal management at bed side and extensive interpretation of the data.  Plan was outlined and discussed in details with the housestaff.  Decision making of high complexity  Action taken for acute disease activity to reflect the level of care provided:  - medication reconciliation  - review laboratory data  continue zosyn  no tpa/Dornase today Patient seen and examined with house-staff during bedside rounds.  Resident note read, including vitals, physical findings, laboratory data, and radiological reports.   Revisions included below.  Direct personal management at bed side and extensive interpretation of the data.  Plan was outlined and discussed in details with the housestaff.  Decision making of high complexity  Action taken for acute disease activity to reflect the level of care provided:  - medication reconciliation  - review laboratory data  continue zosyn  no tpa/Dornase today  await cytology  DC chest tube tomorrow if drainage is less than 150cc  ENT consult  OOB  wbc flutruates

## 2022-12-02 NOTE — SWALLOW VFSS/MBS ASSESSMENT ADULT - ORAL PHASE COMMENTS
Oral phase unremarkable. Oral phase WNL. Adequate manipulation and AP transport of bolus. Complete oral clearance.

## 2022-12-02 NOTE — PROGRESS NOTE ADULT - PROBLEM SELECTOR PLAN 3
Pt with low-grade temperatures (Tmax 99F), acute on chronic cough, CP, dyspnea, and weakness x1 week. CXR with L pleural effusion. CT chest/abdomen at outside hospital on 11/23 with L pleural effusion and probable partially cystic mass at L lung base.     - s/p chest tube placement. fluid studies consistent with exudative effusion. May consider removing chest tube if output is minimal today.   Plan:   - Bronchoscopy 11/30  - f/u pleural fluid cytology  - f/u repeat CT chest

## 2022-12-02 NOTE — SWALLOW VFSS/MBS ASSESSMENT ADULT - RECOMMENDED CONSISTENCY
Full liquid diet, pending treatment of diverticulum. Full liquid diet as tolerated.  No purees or other solids pending management of diverticulum.   Use of consistent secondary swallows.

## 2022-12-02 NOTE — SWALLOW VFSS/MBS ASSESSMENT ADULT - SLP PERTINENT HISTORY OF CURRENT PROBLEM
63 y/o F with PMHx of Crohn's, diverticulosis, asthma, and benign breast cyst (in 1989) presented with acute on chronic cough, weakness, dyspnea, and chest pain x 1 week, CXR with L pleural effusion, found to meet SIRS criteria, likely 2/2 empyema and L pleural effusion. KHADIJAH PNA

## 2022-12-02 NOTE — PROGRESS NOTE ADULT - ASSESSMENT
61y/o female with PMHx of Crohn's, diverticulosis, asthma, and benign breast cyst (in 1989) presented with acute on chronic cough, chest pain, and weakness. Was previously seen at [facility] for chest pain and dyspnea where CT chest and abd/pelvis reportedly showed moderate left pleural effusion, ?cystic mass of left lung base and was dx with empyema and discharged with augmentin and azithromycin for which patient took two days of (also admitted to being given one dose of meropenem by a paramedic family member via IV), started feeling ill with worsened weakness and presented to [facility]. While at [facility], patient was started on zosyn with ultrasound revealing KHADIJAH PNA loculated effusion for which a chest tube was placed with exudative output with no growth to date. s/p bronchoscopy.    Patient's father had history of heavy smoking leading to possible second hand smoke exposure to patient as well as history of benign breast cyst, raising possibility of differential dx of malignancy which can be an etiology for patient's exudative effusion. Differential diagnosis includes malignancy effusion vs parapneumonic effusion vs KHADIJAH PNA. BAL culture with normal oropharyngeal leonel.    Plan:  - While patient remains inpatient, advise to d/c zosyn and start unasyn 3g IV q6h through 12/8 (total two weeks including zosyn course)  - If PO option then advise augmentin 875mg/125mg PO q12h through 12/8 (reportedly took augmentin at home however not treatment failure as patient only took for 1-2 days)  - f/u cytopathology    ID team 1 will continue to follow. 61y/o female with PMHx of Crohn's, diverticulosis, asthma, and benign breast cyst (in 1989) presented with acute on chronic cough, chest pain, and weakness. Was previously seen at [facility] for chest pain and dyspnea where CT chest and abd/pelvis reportedly showed moderate left pleural effusion, ?cystic mass of left lung base and was dx with empyema and discharged with augmentin and azithromycin for which patient took two days of (also admitted to being given one dose of meropenem by a paramedic family member via IV), started feeling ill with worsened weakness and presented to [facility]. While at [facility], patient was started on zosyn with ultrasound revealing KHADIJAH PNA loculated effusion for which a chest tube was placed with exudative output with no growth to date. s/p bronchoscopy.    Patient's father had history of heavy smoking leading to possible second hand smoke exposure to patient as well as history of benign breast cyst, raising possibility of differential dx of malignancy which can be an etiology for patient's exudative effusion. Differential diagnosis includes malignancy effusion vs parapneumonic effusion vs KHADIJAH PNA. BAL culture with normal oropharyngeal leonel.    Plan:  - While patient remains inpatient, advise to d/c zosyn and start unasyn 3g IV q6h through 12/8 (total two weeks including zosyn course)  - If PO option then advise augmentin 875mg/125mg PO q12h through 12/8 (reportedly took augmentin at home however not treatment failure as patient only took for 1-2 days)  - f/u cytopathology    ID team 1 will sign off. Please reconsult if further ID input is needed.

## 2022-12-02 NOTE — PROGRESS NOTE ADULT - TIME BILLING
LLL aspiration pneumonia with parapneumonic effusion. BAL culture yield normal (oropharyngeal) leonel. Advise transition from IV Zosyn to IV Unasyn while in house with transition to PO Augmentin upon discharge to complete course; advise at least a 2 week course inclusive of Zosyn exposure; defer decision to extend to primary/pulmonary team. Antibiotic treatment options discussed extensively with patient and her .  Please reconsult with ?

## 2022-12-02 NOTE — PROGRESS NOTE ADULT - SUBJECTIVE AND OBJECTIVE BOX
INFECTIOUS DISEASES CONSULT FOLLOW-UP NOTE    INTERVAL HPI/OVERNIGHT EVENTS: No overnight event.    Subjective: Patient seen and examined at bedside. Had xray cineesophagogram swallow function performed. Denies fevers, chills, cough, abdominal pain, diarrhea.      ANTIBIOTICS/RELEVANT:    MEDICATIONS  (STANDING):  ampicillin/sulbactam  IVPB 3 Gram(s) IV Intermittent every 6 hours  budesonide 160 MICROgram(s)/formoterol 4.5 MICROgram(s) Inhaler 2 Puff(s) Inhalation two times a day  enoxaparin Injectable 40 milliGRAM(s) SubCutaneous every 24 hours    MEDICATIONS  (PRN):  acetaminophen     Tablet .. 650 milliGRAM(s) Oral every 6 hours PRN Temp greater or equal to 38C (100.4F), Mild Pain (1 - 3)  guaiFENesin Oral Liquid (Sugar-Free) 100 milliGRAM(s) Oral every 8 hours PRN Cough  melatonin 3 milliGRAM(s) Oral at bedtime PRN Insomnia        Vital Signs Last 24 Hrs  T(C): 36.7 (02 Dec 2022 16:04), Max: 37.2 (01 Dec 2022 20:40)  T(F): 98.1 (02 Dec 2022 16:04), Max: 98.9 (01 Dec 2022 20:40)  HR: 87 (02 Dec 2022 16:04) (83 - 92)  BP: 106/68 (02 Dec 2022 16:04) (98/63 - 115/69)  BP(mean): --  RR: 18 (02 Dec 2022 16:04) (18 - 20)  SpO2: 93% (02 Dec 2022 16:04) (92% - 96%)    Parameters below as of 02 Dec 2022 16:04  Patient On (Oxygen Delivery Method): nasal cannula  O2 Flow (L/min): 2      12-01-22 @ 07:01  -  12-02-22 @ 07:00  --------------------------------------------------------  IN: 50 mL / OUT: 190 mL / NET: -140 mL        PHYSICAL EXAM  Constitutional: alert, NAD  Eyes: the sclera and conjunctiva were normal.   ENT: the ears and nose were normal in appearance.   Neck: the appearance of the neck was normal and the neck was supple.   Pulmonary: no respiratory distress and lungs CTA bilaterally. chest tube c/d/i and without erythema or tenderness around site  Heart: heart rate was normal and rhythm regular, normal S1 and S2  Vascular: no peripheral edema  Abdomen: normal bowel sounds, soft, non-tender  Neurological: no focal deficits  Psychiatric: the affect was normal      LABS:                        11.7   14.44 )-----------( 742      ( 02 Dec 2022 08:04 )             37.6     12-02    133<L>  |  97  |  5<L>  ----------------------------<  111<H>  4.8   |  27  |  0.65    Ca    8.5      02 Dec 2022 08:04  Phos  2.4     12-02  Mg     2.1     12-02    TPro  6.6  /  Alb  2.8<L>  /  TBili  0.2  /  DBili  x   /  AST  26  /  ALT  47<H>  /  AlkPhos  109  12-02    PT/INR - ( 01 Dec 2022 08:04 )   PT: 15.7 sec;   INR: 1.32          PTT - ( 01 Dec 2022 08:04 )  PTT:28.7 sec      MICROBIOLOGY:    Culture - Acid Fast - Bronchial w/Smear (collected 30 Nov 2022 09:23)  Source: BAL AFB-LEFT UPPER LOBE LINGULA BRONCHIAL-BAL-CULTURE    Culture - Fungal, Bronchial (collected 30 Nov 2022 09:23)  Source: BAL FUNGAL-LEFT UPPER LOBE LINGULA BRONCHIAL-BAL-CULTU  Preliminary Report (01 Dec 2022 11:03):    Testing in progress    Culture - Bronchial (collected 30 Nov 2022 09:23)  Source: Lavage LEFT UPPER LOBE LINGULA BRONCHIAL-BAL-CULTURE  Gram Stain (30 Nov 2022 16:30):    No epithelial cells seen    Few WBC's    Rare Gram positive cocci in pairs  Final Report (02 Dec 2022 09:07):    Normal Respiratory Karen present    Culture - Fungal, Body Fluid (collected 28 Nov 2022 18:28)  Source: Pleural Fl L pleural  Preliminary Report (30 Nov 2022 06:47):    Testing in progress    Culture - Acid Fast - Body Fluid w/Smear (collected 28 Nov 2022 18:28)  Source: Pleural Fl L pleural  Preliminary Report (30 Nov 2022 15:05):    Culture is being performed.    Culture - Body Fluid with Gram Stain (collected 28 Nov 2022 18:28)  Source: Pleural Fl L pleural  Gram Stain (28 Nov 2022 20:31):    No organisms seen    Rare WBC's  Preliminary Report (29 Nov 2022 09:10):    No growth to date        RADIOLOGY & ADDITIONAL STUDIES:  Reviewed

## 2022-12-02 NOTE — SWALLOW VFSS/MBS ASSESSMENT ADULT - ESOPHAGEAL STAGE
Per radiologist, “Contrast is seen pooling in a large esophageal outpouching originating from the posterior esophagus at the level of C4-C5 and protruding to the left of the esophagus, consistent with a Zenker's diverticulum.” Backflow of contrast noted from the diverticulum to the entryway of the laryngeal vestibule (worst with 1/2 tbsp of pudding) accompanied by wet vocal quality which can be indicative of airway invasion. Backflowed contrast was cleared with secondary swallow (cued/spontaneous) or liquid wash.   No esophageal dysmotility noted with bolus passing through distal esophagus and GE junction without difficulty.

## 2022-12-02 NOTE — SWALLOW VFSS/MBS ASSESSMENT ADULT - RECOMMENDED FEEDING/EATING TECHNIQUES
allow for swallow between intakes/maintain upright posture during/after eating for 30 mins/oral hygiene/position upright (90 degrees)/provide rest periods between swallows/small sips/bites

## 2022-12-02 NOTE — SWALLOW VFSS/MBS ASSESSMENT ADULT - SLP GENERAL OBSERVATIONS
Patient agreeable to eval. O2 via NC. Patient agreeable to eval. Pt able follow multi step commands and participate in complex conversation. Pt receiving 2L O2 via NC. Patient sitting upright in Hausted chair for lateral view and standing for AP and oblique views.

## 2022-12-02 NOTE — SWALLOW VFSS/MBS ASSESSMENT ADULT - ADDITIONAL RECOMMENDATIONS
ENT consult to address diverticulum. ENT/GI consult to address diverticulum. ENT/GI consult to address diverticulum.    This svc will sign off at this time.

## 2022-12-03 LAB
ALBUMIN SERPL ELPH-MCNC: 2.7 G/DL — LOW (ref 3.3–5)
ALP SERPL-CCNC: 110 U/L — SIGNIFICANT CHANGE UP (ref 40–120)
ALT FLD-CCNC: 37 U/L — SIGNIFICANT CHANGE UP (ref 10–45)
ANION GAP SERPL CALC-SCNC: 8 MMOL/L — SIGNIFICANT CHANGE UP (ref 5–17)
APPEARANCE UR: CLEAR — SIGNIFICANT CHANGE UP
AST SERPL-CCNC: 18 U/L — SIGNIFICANT CHANGE UP (ref 10–40)
BACTERIA # UR AUTO: PRESENT /HPF
BASOPHILS # BLD AUTO: 0.05 K/UL — SIGNIFICANT CHANGE UP (ref 0–0.2)
BASOPHILS NFR BLD AUTO: 0.3 % — SIGNIFICANT CHANGE UP (ref 0–2)
BILIRUB SERPL-MCNC: 0.3 MG/DL — SIGNIFICANT CHANGE UP (ref 0.2–1.2)
BILIRUB UR-MCNC: NEGATIVE — SIGNIFICANT CHANGE UP
BUN SERPL-MCNC: 5 MG/DL — LOW (ref 7–23)
CALCIUM SERPL-MCNC: 8.6 MG/DL — SIGNIFICANT CHANGE UP (ref 8.4–10.5)
CHLORIDE SERPL-SCNC: 99 MMOL/L — SIGNIFICANT CHANGE UP (ref 96–108)
CO2 SERPL-SCNC: 28 MMOL/L — SIGNIFICANT CHANGE UP (ref 22–31)
COLOR SPEC: YELLOW — SIGNIFICANT CHANGE UP
CREAT SERPL-MCNC: 0.55 MG/DL — SIGNIFICANT CHANGE UP (ref 0.5–1.3)
CULTURE RESULTS: NO GROWTH — SIGNIFICANT CHANGE UP
DIFF PNL FLD: ABNORMAL
EGFR: 105 ML/MIN/1.73M2 — SIGNIFICANT CHANGE UP
EOSINOPHIL # BLD AUTO: 0.09 K/UL — SIGNIFICANT CHANGE UP (ref 0–0.5)
EOSINOPHIL NFR BLD AUTO: 0.5 % — SIGNIFICANT CHANGE UP (ref 0–6)
EPI CELLS # UR: SIGNIFICANT CHANGE UP /HPF (ref 0–5)
GLUCOSE SERPL-MCNC: 132 MG/DL — HIGH (ref 70–99)
GLUCOSE UR QL: NEGATIVE — SIGNIFICANT CHANGE UP
HCT VFR BLD CALC: 34.4 % — LOW (ref 34.5–45)
HGB BLD-MCNC: 10.7 G/DL — LOW (ref 11.5–15.5)
IMM GRANULOCYTES NFR BLD AUTO: 1.4 % — HIGH (ref 0–0.9)
KETONES UR-MCNC: NEGATIVE — SIGNIFICANT CHANGE UP
LEUKOCYTE ESTERASE UR-ACNC: NEGATIVE — SIGNIFICANT CHANGE UP
LYMPHOCYTES # BLD AUTO: 15.3 % — SIGNIFICANT CHANGE UP (ref 13–44)
LYMPHOCYTES # BLD AUTO: 2.77 K/UL — SIGNIFICANT CHANGE UP (ref 1–3.3)
MAGNESIUM SERPL-MCNC: 2.1 MG/DL — SIGNIFICANT CHANGE UP (ref 1.6–2.6)
MCHC RBC-ENTMCNC: 28.6 PG — SIGNIFICANT CHANGE UP (ref 27–34)
MCHC RBC-ENTMCNC: 31.1 GM/DL — LOW (ref 32–36)
MCV RBC AUTO: 92 FL — SIGNIFICANT CHANGE UP (ref 80–100)
MONOCYTES # BLD AUTO: 2.03 K/UL — HIGH (ref 0–0.9)
MONOCYTES NFR BLD AUTO: 11.2 % — SIGNIFICANT CHANGE UP (ref 2–14)
NEUTROPHILS # BLD AUTO: 12.9 K/UL — HIGH (ref 1.8–7.4)
NEUTROPHILS NFR BLD AUTO: 71.3 % — SIGNIFICANT CHANGE UP (ref 43–77)
NITRITE UR-MCNC: NEGATIVE — SIGNIFICANT CHANGE UP
NRBC # BLD: 0 /100 WBCS — SIGNIFICANT CHANGE UP (ref 0–0)
PH UR: 6.5 — SIGNIFICANT CHANGE UP (ref 5–8)
PHOSPHATE SERPL-MCNC: 2.5 MG/DL — SIGNIFICANT CHANGE UP (ref 2.5–4.5)
PLATELET # BLD AUTO: 881 K/UL — HIGH (ref 150–400)
POTASSIUM SERPL-MCNC: 4.3 MMOL/L — SIGNIFICANT CHANGE UP (ref 3.5–5.3)
POTASSIUM SERPL-SCNC: 4.3 MMOL/L — SIGNIFICANT CHANGE UP (ref 3.5–5.3)
PROT SERPL-MCNC: 6.5 G/DL — SIGNIFICANT CHANGE UP (ref 6–8.3)
PROT UR-MCNC: NEGATIVE MG/DL — SIGNIFICANT CHANGE UP
RBC # BLD: 3.74 M/UL — LOW (ref 3.8–5.2)
RBC # FLD: 13.1 % — SIGNIFICANT CHANGE UP (ref 10.3–14.5)
RBC CASTS # UR COMP ASSIST: < 5 /HPF — SIGNIFICANT CHANGE UP
SODIUM SERPL-SCNC: 135 MMOL/L — SIGNIFICANT CHANGE UP (ref 135–145)
SP GR SPEC: <=1.005 — SIGNIFICANT CHANGE UP (ref 1–1.03)
SPECIMEN SOURCE: SIGNIFICANT CHANGE UP
UROBILINOGEN FLD QL: 0.2 E.U./DL — SIGNIFICANT CHANGE UP
WBC # BLD: 18.09 K/UL — HIGH (ref 3.8–10.5)
WBC # FLD AUTO: 18.09 K/UL — HIGH (ref 3.8–10.5)
WBC UR QL: < 5 /HPF — SIGNIFICANT CHANGE UP

## 2022-12-03 PROCEDURE — 99232 SBSQ HOSP IP/OBS MODERATE 35: CPT

## 2022-12-03 PROCEDURE — 71045 X-RAY EXAM CHEST 1 VIEW: CPT | Mod: 26

## 2022-12-03 PROCEDURE — 93010 ELECTROCARDIOGRAM REPORT: CPT

## 2022-12-03 RX ORDER — LACTULOSE 10 G/15ML
20 SOLUTION ORAL ONCE
Refills: 0 | Status: COMPLETED | OUTPATIENT
Start: 2022-12-03 | End: 2022-12-03

## 2022-12-03 RX ADMIN — LIDOCAINE 1 PATCH: 4 CREAM TOPICAL at 07:32

## 2022-12-03 RX ADMIN — AMPICILLIN SODIUM AND SULBACTAM SODIUM 200 GRAM(S): 250; 125 INJECTION, POWDER, FOR SUSPENSION INTRAMUSCULAR; INTRAVENOUS at 21:00

## 2022-12-03 RX ADMIN — BUDESONIDE AND FORMOTEROL FUMARATE DIHYDRATE 2 PUFF(S): 160; 4.5 AEROSOL RESPIRATORY (INHALATION) at 18:03

## 2022-12-03 RX ADMIN — Medication 650 MILLIGRAM(S): at 16:12

## 2022-12-03 RX ADMIN — AMPICILLIN SODIUM AND SULBACTAM SODIUM 200 GRAM(S): 250; 125 INJECTION, POWDER, FOR SUSPENSION INTRAMUSCULAR; INTRAVENOUS at 03:37

## 2022-12-03 RX ADMIN — Medication 650 MILLIGRAM(S): at 15:12

## 2022-12-03 RX ADMIN — BUDESONIDE AND FORMOTEROL FUMARATE DIHYDRATE 2 PUFF(S): 160; 4.5 AEROSOL RESPIRATORY (INHALATION) at 06:43

## 2022-12-03 RX ADMIN — LACTULOSE 20 GRAM(S): 10 SOLUTION ORAL at 04:35

## 2022-12-03 RX ADMIN — AMPICILLIN SODIUM AND SULBACTAM SODIUM 200 GRAM(S): 250; 125 INJECTION, POWDER, FOR SUSPENSION INTRAMUSCULAR; INTRAVENOUS at 08:59

## 2022-12-03 RX ADMIN — ENOXAPARIN SODIUM 40 MILLIGRAM(S): 100 INJECTION SUBCUTANEOUS at 13:27

## 2022-12-03 RX ADMIN — AMPICILLIN SODIUM AND SULBACTAM SODIUM 200 GRAM(S): 250; 125 INJECTION, POWDER, FOR SUSPENSION INTRAMUSCULAR; INTRAVENOUS at 14:57

## 2022-12-03 NOTE — PROGRESS NOTE ADULT - SUBJECTIVE AND OBJECTIVE BOX
Interval Events: Reviewed  Patient seen and examined at bedside.    Patient is a 62y old  Female who presents with a chief complaint of productive cough and weakness (29 Nov 2022 09:26)  no acute event overnight, 4L NC 95%      PAST MEDICAL & SURGICAL HISTORY:  Prediabetes      Diverticulosis      Crohn&#x27;s disease      History of cholecystectomy      S/P parathyroidectomy      Benign cyst of breast          MEDICATIONS:  Pulmonary:  budesonide 160 MICROgram(s)/formoterol 4.5 MICROgram(s) Inhaler 2 Puff(s) Inhalation two times a day  guaiFENesin Oral Liquid (Sugar-Free) 100 milliGRAM(s) Oral every 8 hours PRN    Antimicrobials:  ampicillin/sulbactam  IVPB 3 Gram(s) IV Intermittent every 6 hours    Anticoagulants:  enoxaparin Injectable 40 milliGRAM(s) SubCutaneous every 24 hours    Cardiac:      Allergies    tetracyclines (Swelling)    Intolerances        Vital Signs Last 24 Hrs  T(C): 36.6 (03 Dec 2022 05:49), Max: 36.7 (02 Dec 2022 13:43)  T(F): 97.9 (03 Dec 2022 05:49), Max: 98.1 (02 Dec 2022 13:43)  HR: 90 (03 Dec 2022 05:49) (79 - 92)  BP: 99/63 (03 Dec 2022 05:49) (99/63 - 115/69)  BP(mean): --  RR: 18 (03 Dec 2022 05:49) (18 - 18)  SpO2: 97% (03 Dec 2022 05:49) (93% - 97%)    Parameters below as of 03 Dec 2022 05:49    O2 Flow (L/min): 4 12-02 @ 07:01  -  12-03 @ 07:00  --------------------------------------------------------  IN: 0 mL / OUT: 0 mL / NET: 0 mL          Review of Systems:   •	General: negative  •	Skin/Breast: negative  •	Ophthalmologic: negative  •	ENMT: negative  •	Respiratory and Thorax: negative  •	Cardiovascular: negative  •	Gastrointestinal: negative  •	Genitourinary: negative  •	Musculoskeletal: negative  •	Neurological: negative  •	Psychiatric: negative  •	Hematology/Lymphatics: negative  •	Endocrine: negative  •	Allergic/Immunologic: negative    Physical Exam:   • Constitutional:	NAD  • Eyes:	EOMI; PERRL; no drainage or redness  • ENMT:	No oral lesions; no gross abnormalities  • Neck	no thyromegaly or nodules  • Breasts:	not examined  • Back:	No deformity or limitation of movement  • Respiratory:	Breath Sounds equal & clear to auscultation, no accessory muscle use, left side CT intact   • Cardiovascular:	Regular rate & rhythm, normal S1, S2; no murmurs, gallops or rubs; no S3, S4  • Gastrointestinal:	Soft, non-tender, no hepatosplenomegaly, normal bowel sounds  • Genitourinary:	not examined  • Rectal: not examined  • Extremities:	No cyanosis, clubbing or edema  • Vascular:	Equal and normal pulses (dorsalis pedis)  • Neurologica:l	not examined  • Skin:	No lesions; no rash  • Lymph Nodes:	No lymphadedenopathy  • Musculoskeletal:	No joint pain, swelling or deformity; no limitation of movement        LABS:      CBC Full  -  ( 03 Dec 2022 06:27 )  WBC Count : 18.09 K/uL  RBC Count : 3.74 M/uL  Hemoglobin : 10.7 g/dL  Hematocrit : 34.4 %  Platelet Count - Automated : 881 K/uL  Mean Cell Volume : 92.0 fl  Mean Cell Hemoglobin : 28.6 pg  Mean Cell Hemoglobin Concentration : 31.1 gm/dL  Auto Neutrophil # : 12.90 K/uL  Auto Lymphocyte # : 2.77 K/uL  Auto Monocyte # : 2.03 K/uL  Auto Eosinophil # : 0.09 K/uL  Auto Basophil # : 0.05 K/uL  Auto Neutrophil % : 71.3 %  Auto Lymphocyte % : 15.3 %  Auto Monocyte % : 11.2 %  Auto Eosinophil % : 0.5 %  Auto Basophil % : 0.3 %    12-03    135  |  99  |  5<L>  ----------------------------<  132<H>  4.3   |  28  |  0.55    Ca    8.6      03 Dec 2022 06:27  Phos  2.5     12-03  Mg     2.1     12-03    TPro  6.5  /  Alb  2.7<L>  /  TBili  0.3  /  DBili  x   /  AST  18  /  ALT  37  /  AlkPhos  110  12-03    PT/INR - ( 01 Dec 2022 08:04 )   PT: 15.7 sec;   INR: 1.32          PTT - ( 01 Dec 2022 08:04 )  PTT:28.7 sec                    RADIOLOGY & ADDITIONAL STUDIES (The following images were personally reviewed):  Hopkins:                                     No  Urine output:                       adequate  DVT prophylaxis:                 Yes  Flattus:                                  Yes  Bowel movement:              No

## 2022-12-03 NOTE — CHART NOTE - NSCHARTNOTEFT_GEN_A_CORE
Alerted this morning at approximately 9 AM that patients chest tube had disconnected from her pleurevac when she was ambulating to the bathroom. The pleurevac was replaced, but an air leak was noted by primary team. Came to assess the patient, no air leak appreciated. Chest tube flushing, no clot or obstruction appreciated. Initial air most likely from environment when chest tube was disconnected.     Discussed with nursing - approximately 1L of output over last 24 hours but only 100 ccs this shift. Will continue to assess; if output remains low, will pull chest tube tomorrow.     Discussed with Dr. Delgado

## 2022-12-04 LAB
ALBUMIN SERPL ELPH-MCNC: 2.6 G/DL — LOW (ref 3.3–5)
ALP SERPL-CCNC: 98 U/L — SIGNIFICANT CHANGE UP (ref 40–120)
ALT FLD-CCNC: 35 U/L — SIGNIFICANT CHANGE UP (ref 10–45)
ANION GAP SERPL CALC-SCNC: 6 MMOL/L — SIGNIFICANT CHANGE UP (ref 5–17)
APTT BLD: 29.1 SEC — SIGNIFICANT CHANGE UP (ref 27.5–35.5)
AST SERPL-CCNC: 26 U/L — SIGNIFICANT CHANGE UP (ref 10–40)
BILIRUB SERPL-MCNC: 0.2 MG/DL — SIGNIFICANT CHANGE UP (ref 0.2–1.2)
BLD GP AB SCN SERPL QL: NEGATIVE — SIGNIFICANT CHANGE UP
BUN SERPL-MCNC: 4 MG/DL — LOW (ref 7–23)
CALCIUM SERPL-MCNC: 8.4 MG/DL — SIGNIFICANT CHANGE UP (ref 8.4–10.5)
CHLORIDE SERPL-SCNC: 102 MMOL/L — SIGNIFICANT CHANGE UP (ref 96–108)
CO2 SERPL-SCNC: 29 MMOL/L — SIGNIFICANT CHANGE UP (ref 22–31)
CREAT SERPL-MCNC: 0.6 MG/DL — SIGNIFICANT CHANGE UP (ref 0.5–1.3)
EGFR: 103 ML/MIN/1.73M2 — SIGNIFICANT CHANGE UP
GLUCOSE SERPL-MCNC: 103 MG/DL — HIGH (ref 70–99)
HCT VFR BLD CALC: 31.3 % — LOW (ref 34.5–45)
HGB BLD-MCNC: 10 G/DL — LOW (ref 11.5–15.5)
INR BLD: 1.35 — HIGH (ref 0.88–1.16)
MAGNESIUM SERPL-MCNC: 2.2 MG/DL — SIGNIFICANT CHANGE UP (ref 1.6–2.6)
MCHC RBC-ENTMCNC: 29 PG — SIGNIFICANT CHANGE UP (ref 27–34)
MCHC RBC-ENTMCNC: 31.9 GM/DL — LOW (ref 32–36)
MCV RBC AUTO: 90.7 FL — SIGNIFICANT CHANGE UP (ref 80–100)
NRBC # BLD: 0 /100 WBCS — SIGNIFICANT CHANGE UP (ref 0–0)
PHOSPHATE SERPL-MCNC: 2.4 MG/DL — LOW (ref 2.5–4.5)
PLATELET # BLD AUTO: 799 K/UL — HIGH (ref 150–400)
POTASSIUM SERPL-MCNC: 4.2 MMOL/L — SIGNIFICANT CHANGE UP (ref 3.5–5.3)
POTASSIUM SERPL-SCNC: 4.2 MMOL/L — SIGNIFICANT CHANGE UP (ref 3.5–5.3)
PROT SERPL-MCNC: 6.2 G/DL — SIGNIFICANT CHANGE UP (ref 6–8.3)
PROTHROM AB SERPL-ACNC: 16.1 SEC — HIGH (ref 10.5–13.4)
RBC # BLD: 3.45 M/UL — LOW (ref 3.8–5.2)
RBC # FLD: 13.3 % — SIGNIFICANT CHANGE UP (ref 10.3–14.5)
RH IG SCN BLD-IMP: POSITIVE — SIGNIFICANT CHANGE UP
SODIUM SERPL-SCNC: 137 MMOL/L — SIGNIFICANT CHANGE UP (ref 135–145)
WBC # BLD: 15.48 K/UL — HIGH (ref 3.8–10.5)
WBC # FLD AUTO: 15.48 K/UL — HIGH (ref 3.8–10.5)

## 2022-12-04 RX ADMIN — AMPICILLIN SODIUM AND SULBACTAM SODIUM 200 GRAM(S): 250; 125 INJECTION, POWDER, FOR SUSPENSION INTRAMUSCULAR; INTRAVENOUS at 22:14

## 2022-12-04 RX ADMIN — AMPICILLIN SODIUM AND SULBACTAM SODIUM 200 GRAM(S): 250; 125 INJECTION, POWDER, FOR SUSPENSION INTRAMUSCULAR; INTRAVENOUS at 03:03

## 2022-12-04 RX ADMIN — BUDESONIDE AND FORMOTEROL FUMARATE DIHYDRATE 2 PUFF(S): 160; 4.5 AEROSOL RESPIRATORY (INHALATION) at 06:01

## 2022-12-04 RX ADMIN — Medication 650 MILLIGRAM(S): at 22:11

## 2022-12-04 RX ADMIN — AMPICILLIN SODIUM AND SULBACTAM SODIUM 200 GRAM(S): 250; 125 INJECTION, POWDER, FOR SUSPENSION INTRAMUSCULAR; INTRAVENOUS at 08:57

## 2022-12-04 RX ADMIN — AMPICILLIN SODIUM AND SULBACTAM SODIUM 200 GRAM(S): 250; 125 INJECTION, POWDER, FOR SUSPENSION INTRAMUSCULAR; INTRAVENOUS at 14:08

## 2022-12-04 RX ADMIN — ENOXAPARIN SODIUM 40 MILLIGRAM(S): 100 INJECTION SUBCUTANEOUS at 12:51

## 2022-12-04 RX ADMIN — Medication 650 MILLIGRAM(S): at 23:11

## 2022-12-04 RX ADMIN — BUDESONIDE AND FORMOTEROL FUMARATE DIHYDRATE 2 PUFF(S): 160; 4.5 AEROSOL RESPIRATORY (INHALATION) at 17:46

## 2022-12-04 RX ADMIN — Medication 3 MILLIGRAM(S): at 22:12

## 2022-12-04 NOTE — PROGRESS NOTE ADULT - ASSESSMENT
63 y/o F with PMHx of Crohn's, diverticulosis, asthma, and benign breast cyst (in 1989) presented with acute on chronic cough, weakness, dyspnea, and chest pain x 1 week, CXR with L pleural effusion, found to meet SIRS criteria, likely 2/2 empyema and L pleural effusion. s/p chest tube placement and bronchoscopy.

## 2022-12-04 NOTE — PROGRESS NOTE ADULT - PROBLEM SELECTOR PLAN 3
Pt with low-grade temperatures (Tmax 99F), acute on chronic cough, CP, dyspnea, and weakness x1 week. CXR with L pleural effusion. CT chest/abdomen at outside hospital on 11/23 with L pleural effusion and probable partially cystic mass at L lung base. Bronchoscopy performed on 11/30    - s/p chest tube placement. fluid studies consistent with exudative effusion. May consider removing chest tube if output is minimal today.   Plan:   - f/u pleural fluid cytology Pt with low-grade temperatures (Tmax 99F), acute on chronic cough, CP, dyspnea, and weakness x1 week. CXR with L pleural effusion. CT chest/abdomen at outside hospital on 11/23 with L pleural effusion and probable partially cystic mass at L lung base. Was diagnosed with empyema at outside hospital and sent home with Augmentin and azithromycin.  - c/w zosyn 3.375g q8h  - ID consulted, f/u recs

## 2022-12-04 NOTE — PROGRESS NOTE ADULT - SUBJECTIVE AND OBJECTIVE BOX
Interval Events: Reviewed  Patient seen and examined at bedside.    Patient is a 62y old  Female who presents with a chief complaint of cough and weakness (03 Dec 2022 07:41)  no acute event overnight, 3L NC 95%      PAST MEDICAL & SURGICAL HISTORY:  Prediabetes      Diverticulosis      Crohn&#x27;s disease      History of cholecystectomy      S/P parathyroidectomy      Benign cyst of breast          MEDICATIONS:  Pulmonary:  budesonide 160 MICROgram(s)/formoterol 4.5 MICROgram(s) Inhaler 2 Puff(s) Inhalation two times a day  guaiFENesin Oral Liquid (Sugar-Free) 100 milliGRAM(s) Oral every 8 hours PRN    Antimicrobials:  ampicillin/sulbactam  IVPB 3 Gram(s) IV Intermittent every 6 hours    Anticoagulants:  enoxaparin Injectable 40 milliGRAM(s) SubCutaneous every 24 hours    Cardiac:      Allergies    tetracyclines (Swelling)    Intolerances        Vital Signs Last 24 Hrs  T(C): 37.1 (04 Dec 2022 11:31), Max: 38.4 (03 Dec 2022 15:08)  T(F): 98.8 (04 Dec 2022 11:31), Max: 101.1 (03 Dec 2022 15:08)  HR: 89 (04 Dec 2022 11:31) (75 - 91)  BP: 129/74 (04 Dec 2022 11:31) (108/67 - 129/74)  BP(mean): --  RR: 19 (04 Dec 2022 11:31) (18 - 19)  SpO2: 95% (04 Dec 2022 11:31) (95% - 98%)    Parameters below as of 04 Dec 2022 11:31  Patient On (Oxygen Delivery Method): nasal cannula  O2 Flow (L/min): 3      12-03 @ 07:01  -  12-04 @ 07:00  --------------------------------------------------------  IN: 0 mL / OUT: 1120 mL / NET: -1120 mL          Review of Systems:   •	General: negative  •	Skin/Breast: negative  •	Ophthalmologic: negative  •	ENMT: negative  •	Respiratory and Thorax: negative  •	Cardiovascular: negative  •	Gastrointestinal: negative  •	Genitourinary: negative  •	Musculoskeletal: negative  •	Neurological: negative  •	Psychiatric: negative  •	Hematology/Lymphatics: negative  •	Endocrine: negative  •	Allergic/Immunologic: negative    Physical Exam:   • Constitutional:	NAD  • Eyes:	EOMI; PERRL; no drainage or redness  • ENMT:	No oral lesions; no gross abnormalities  • Neck	no thyromegaly or nodules  • Breasts:	not examined  • Back:	No deformity or limitation of movement  • Respiratory:	Breath Sounds equal & clear to auscultation, no accessory muscle use, left chest tube intact   • Cardiovascular:	Regular rate & rhythm, normal S1, S2; no murmurs, gallops or rubs; no S3, S4  • Gastrointestinal:	Soft, non-tender, no hepatosplenomegaly, normal bowel sounds  • Genitourinary:	not examined  • Rectal: not examined  • Extremities:	No cyanosis, clubbing or edema  • Vascular:	Equal and normal pulses (dorsalis pedis)  • Neurologica:l	not examined  • Skin:	No lesions; no rash  • Lymph Nodes:	No lymphadedenopathy  • Musculoskeletal:	No joint pain, swelling or deformity; no limitation of movement        LABS:      CBC Full  -  ( 04 Dec 2022 05:30 )  WBC Count : 15.48 K/uL  RBC Count : 3.45 M/uL  Hemoglobin : 10.0 g/dL  Hematocrit : 31.3 %  Platelet Count - Automated : 799 K/uL  Mean Cell Volume : 90.7 fl  Mean Cell Hemoglobin : 29.0 pg  Mean Cell Hemoglobin Concentration : 31.9 gm/dL  Auto Neutrophil # : x  Auto Lymphocyte # : x  Auto Monocyte # : x  Auto Eosinophil # : x  Auto Basophil # : x  Auto Neutrophil % : x  Auto Lymphocyte % : x  Auto Monocyte % : x  Auto Eosinophil % : x  Auto Basophil % : x    12-04    137  |  102  |  4<L>  ----------------------------<  103<H>  4.2   |  29  |  0.60    Ca    8.4      04 Dec 2022 05:30  Phos  2.4     12-04  Mg     2.2     12-04    TPro  6.2  /  Alb  2.6<L>  /  TBili  0.2  /  DBili  x   /  AST  26  /  ALT  35  /  AlkPhos  98  12-04    PT/INR - ( 04 Dec 2022 05:30 )   PT: 16.1 sec;   INR: 1.35          PTT - ( 04 Dec 2022 05:30 )  PTT:29.1 sec      Urinalysis Basic - ( 03 Dec 2022 19:01 )    Color: Yellow / Appearance: Clear / SG: <=1.005 / pH: x  Gluc: x / Ketone: NEGATIVE  / Bili: Negative / Urobili: 0.2 E.U./dL   Blood: x / Protein: NEGATIVE mg/dL / Nitrite: NEGATIVE   Leuk Esterase: NEGATIVE / RBC: < 5 /HPF / WBC < 5 /HPF   Sq Epi: x / Non Sq Epi: 0-5 /HPF / Bacteria: Present /HPF              Culture Results:   No growth at 12 hours (12-03 @ 18:10)      RADIOLOGY & ADDITIONAL STUDIES (The following images were personally reviewed):  Hopkins:                                     No  Urine output:                       adequate  DVT prophylaxis:                 Yes  Flattus:                                  Yes  Bowel movement:              No

## 2022-12-04 NOTE — PROGRESS NOTE ADULT - SUBJECTIVE AND OBJECTIVE BOX
OVERNIGHT EVENTS: LAURENCE    SUBJECTIVE:  Patient seen and examined at bedside.  ROS: Patient denies h/n/v/d, fever, chills, cp, palpitations, sob, abd pain, leg swelling, rashes, dysuria, and changes in BM. Pt endorses discomfort from chest tube site.     Vital Signs Last 12 Hrs  T(F): 98.2 (12-04-22 @ 05:29), Max: 98.4 (12-03-22 @ 21:37)  HR: 75 (12-04-22 @ 05:29) (75 - 82)  BP: 114/69 (12-04-22 @ 05:29) (114/69 - 118/69)  BP(mean): --  RR: 18 (12-04-22 @ 05:29) (18 - 18)  SpO2: 95% (12-04-22 @ 05:29) (95% - 98%)  I&O's Summary    03 Dec 2022 07:01  -  04 Dec 2022 07:00  --------------------------------------------------------  IN: 0 mL / OUT: 1120 mL / NET: -1120 mL        PHYSICAL EXAM:  Constitutional: resting comfortably in bed on 3L NC; NAD  HEENT: NC/AT, PERRL, EOMI, anicteric sclera, no nasal discharge; MMM  Neck: supple; no JVD  Respiratory: unlabored breathing, decreased breath sounds to left lung; no wheezing, rhonchi, or crackles, no intercostal retractions, no accessory muscle use, no nasal flaring  Cardiac: +S1/S2; RRR; no M/R/G  Gastrointestinal: soft, NT/ND; no rebound or guarding; +BS  Extremities: no clubbing or cyanosis; no peripheral edema  Dermatologic: skin warm, dry and intact  Neurologic: AAOx3; no focal deficits        LABS:                        10.0   15.48 )-----------( 799      ( 04 Dec 2022 05:30 )             31.3     12-04    137  |  102  |  4<L>  ----------------------------<  103<H>  4.2   |  29  |  0.60    Ca    8.4      04 Dec 2022 05:30  Phos  2.4     12-04  Mg     2.2     12-04    TPro  6.2  /  Alb  2.6<L>  /  TBili  0.2  /  DBili  x   /  AST  26  /  ALT  35  /  AlkPhos  98  12-04    PT/INR - ( 04 Dec 2022 05:30 )   PT: 16.1 sec;   INR: 1.35          PTT - ( 04 Dec 2022 05:30 )  PTT:29.1 sec  Urinalysis Basic - ( 03 Dec 2022 19:01 )    Color: Yellow / Appearance: Clear / SG: <=1.005 / pH: x  Gluc: x / Ketone: NEGATIVE  / Bili: Negative / Urobili: 0.2 E.U./dL   Blood: x / Protein: NEGATIVE mg/dL / Nitrite: NEGATIVE   Leuk Esterase: NEGATIVE / RBC: < 5 /HPF / WBC < 5 /HPF   Sq Epi: x / Non Sq Epi: 0-5 /HPF / Bacteria: Present /HPF          RADIOLOGY & ADDITIONAL TESTS:    MEDICATIONS  (STANDING):  ampicillin/sulbactam  IVPB 3 Gram(s) IV Intermittent every 6 hours  budesonide 160 MICROgram(s)/formoterol 4.5 MICROgram(s) Inhaler 2 Puff(s) Inhalation two times a day  enoxaparin Injectable 40 milliGRAM(s) SubCutaneous every 24 hours    MEDICATIONS  (PRN):  acetaminophen     Tablet .. 650 milliGRAM(s) Oral every 6 hours PRN Temp greater or equal to 38C (100.4F), Mild Pain (1 - 3)  guaiFENesin Oral Liquid (Sugar-Free) 100 milliGRAM(s) Oral every 8 hours PRN Cough  melatonin 3 milliGRAM(s) Oral at bedtime PRN Insomnia

## 2022-12-04 NOTE — PROGRESS NOTE ADULT - PROBLEM SELECTOR PLAN 3
Pt with low-grade temperatures (Tmax 101.1) now 98.8F, acute on chronic cough, CP, dyspnea, and weakness x1 week. CXR with L pleural effusion. CT chest/abdomen at outside hospital on 11/23 with L pleural effusion and probable partially cystic mass at L lung base.     - s/p chest tube placement. fluid studies consistent with exudative effusion. May consider removing chest tube if output is minimal today.   Plan:   - Bronchoscopy 11/30  - f/u pleural fluid cytology  - f/u repeat CT chest

## 2022-12-04 NOTE — PROGRESS NOTE ADULT - PROBLEM SELECTOR PLAN 2
Pt with low-grade temperatures (Tmax 99F), acute on chronic cough, CP, dyspnea, and weakness x1 week. CXR with L pleural effusion. CT chest/abdomen at outside hospital on 11/23 with L pleural effusion and probable partially cystic mass at L lung base. Was diagnosed with empyema at outside hospital and sent home with Augmentin and azithromycin.  - c/w zosyn 3.375g q8h  - ID consulted, f/u recs Pt met 3/4 SIRS criteria on admission: HR 95, RR 22, WBC 19k. Pt with low-grade temperatures (Tmax 99F), acute on chronic cough, CP, dyspnea, and weakness x1 week. CXR with L pleural effusion. CT chest/abdomen at outside hospital on 11/23 with L pleural effusion and probable partially cystic mass at L lung base. Sx likely secondary to empyema. s/p zosyn 3.375 x1 in ED.   Ur legionella neg    Plan:   - Bronch performed 11/30 AM  - c/w zosyn 3.375g q6h  - C/w robitussin 100mg q8 for cough  - BCx NGTD  - F/u MRSA swab, RVP

## 2022-12-04 NOTE — PROGRESS NOTE ADULT - PROBLEM SELECTOR PLAN 4
CT chest/abdomen at outside hospital with retroareolar mass. Per patient, last mammogram ~4 years ago, nml.     - F/u outpt for further workup Pt with low-grade temperatures (Tmax 99F), acute on chronic cough, CP, dyspnea, and weakness x1 week. CXR with L pleural effusion. CT chest/abdomen at outside hospital on 11/23 with L pleural effusion and probable partially cystic mass at L lung base. Bronchoscopy performed on 11/30    - s/p chest tube placement. fluid studies consistent with exudative effusion. May consider removing chest tube if output is minimal today.   Plan:   - f/u pleural fluid cytology

## 2022-12-04 NOTE — PROGRESS NOTE ADULT - PROBLEM SELECTOR PLAN 7
Plan:  F: None  E: replete K<4, Mg<2  N: kosher diet  VTE: Lovenox 40 Qd  GI: none  C: Full Code  D: 7Wo CT chest/abdomen on 11/23 at outside hospital with colonic diverticulosis. Pt asymptomatic currently.     - F/u outpt

## 2022-12-04 NOTE — PROGRESS NOTE ADULT - PROBLEM SELECTOR PLAN 1
Pt met 3/4 SIRS criteria on admission: HR 95, RR 22, WBC 19k. Pt with low-grade temperatures (Tmax 99F), acute on chronic cough, CP, dyspnea, and weakness x1 week. CXR with L pleural effusion. CT chest/abdomen at outside hospital on 11/23 with L pleural effusion and probable partially cystic mass at L lung base. Sx likely secondary to empyema. s/p zosyn 3.375 x1 in ED.   Ur legionella neg    Plan:   - Bronch performed 11/30 AM  - c/w zosyn 3.375g q6h  - C/w robitussin 100mg q8 for cough  - BCx NGTD  - F/u MRSA swab, RVP CT A/P demonstrated CTAP demonstrated finding in uterine c/f Endometrial tumor, polyp or submucosal fibroid. Transvaginal US ordered.   - F/u Transvaginal US results.

## 2022-12-05 LAB
ALBUMIN SERPL ELPH-MCNC: 2.3 G/DL — LOW (ref 3.3–5)
ALP SERPL-CCNC: 87 U/L — SIGNIFICANT CHANGE UP (ref 40–120)
ALT FLD-CCNC: 47 U/L — HIGH (ref 10–45)
ANION GAP SERPL CALC-SCNC: 5 MMOL/L — SIGNIFICANT CHANGE UP (ref 5–17)
AST SERPL-CCNC: 32 U/L — SIGNIFICANT CHANGE UP (ref 10–40)
BILIRUB SERPL-MCNC: <0.2 MG/DL — SIGNIFICANT CHANGE UP (ref 0.2–1.2)
BUN SERPL-MCNC: 5 MG/DL — LOW (ref 7–23)
CALCIUM SERPL-MCNC: 9 MG/DL — SIGNIFICANT CHANGE UP (ref 8.4–10.5)
CHLORIDE SERPL-SCNC: 105 MMOL/L — SIGNIFICANT CHANGE UP (ref 96–108)
CO2 SERPL-SCNC: 31 MMOL/L — SIGNIFICANT CHANGE UP (ref 22–31)
CREAT SERPL-MCNC: 0.62 MG/DL — SIGNIFICANT CHANGE UP (ref 0.5–1.3)
EGFR: 102 ML/MIN/1.73M2 — SIGNIFICANT CHANGE UP
GLUCOSE SERPL-MCNC: 105 MG/DL — HIGH (ref 70–99)
HCT VFR BLD CALC: 31.2 % — LOW (ref 34.5–45)
HGB BLD-MCNC: 9.6 G/DL — LOW (ref 11.5–15.5)
MAGNESIUM SERPL-MCNC: 2.2 MG/DL — SIGNIFICANT CHANGE UP (ref 1.6–2.6)
MCHC RBC-ENTMCNC: 28.7 PG — SIGNIFICANT CHANGE UP (ref 27–34)
MCHC RBC-ENTMCNC: 30.8 GM/DL — LOW (ref 32–36)
MCV RBC AUTO: 93.1 FL — SIGNIFICANT CHANGE UP (ref 80–100)
NRBC # BLD: 0 /100 WBCS — SIGNIFICANT CHANGE UP (ref 0–0)
PHOSPHATE SERPL-MCNC: 3.5 MG/DL — SIGNIFICANT CHANGE UP (ref 2.5–4.5)
PLATELET # BLD AUTO: 925 K/UL — HIGH (ref 150–400)
POTASSIUM SERPL-MCNC: 4.7 MMOL/L — SIGNIFICANT CHANGE UP (ref 3.5–5.3)
POTASSIUM SERPL-SCNC: 4.7 MMOL/L — SIGNIFICANT CHANGE UP (ref 3.5–5.3)
PROT SERPL-MCNC: 5.9 G/DL — LOW (ref 6–8.3)
RBC # BLD: 3.35 M/UL — LOW (ref 3.8–5.2)
RBC # FLD: 13.3 % — SIGNIFICANT CHANGE UP (ref 10.3–14.5)
SODIUM SERPL-SCNC: 141 MMOL/L — SIGNIFICANT CHANGE UP (ref 135–145)
WBC # BLD: 10.62 K/UL — HIGH (ref 3.8–10.5)
WBC # FLD AUTO: 10.62 K/UL — HIGH (ref 3.8–10.5)

## 2022-12-05 PROCEDURE — 74177 CT ABD & PELVIS W/CONTRAST: CPT | Mod: 26

## 2022-12-05 PROCEDURE — 99233 SBSQ HOSP IP/OBS HIGH 50: CPT

## 2022-12-05 PROCEDURE — 99232 SBSQ HOSP IP/OBS MODERATE 35: CPT | Mod: GC

## 2022-12-05 RX ADMIN — AMPICILLIN SODIUM AND SULBACTAM SODIUM 200 GRAM(S): 250; 125 INJECTION, POWDER, FOR SUSPENSION INTRAMUSCULAR; INTRAVENOUS at 14:26

## 2022-12-05 RX ADMIN — BUDESONIDE AND FORMOTEROL FUMARATE DIHYDRATE 2 PUFF(S): 160; 4.5 AEROSOL RESPIRATORY (INHALATION) at 17:04

## 2022-12-05 RX ADMIN — BUDESONIDE AND FORMOTEROL FUMARATE DIHYDRATE 2 PUFF(S): 160; 4.5 AEROSOL RESPIRATORY (INHALATION) at 08:53

## 2022-12-05 RX ADMIN — AMPICILLIN SODIUM AND SULBACTAM SODIUM 200 GRAM(S): 250; 125 INJECTION, POWDER, FOR SUSPENSION INTRAMUSCULAR; INTRAVENOUS at 21:05

## 2022-12-05 RX ADMIN — AMPICILLIN SODIUM AND SULBACTAM SODIUM 200 GRAM(S): 250; 125 INJECTION, POWDER, FOR SUSPENSION INTRAMUSCULAR; INTRAVENOUS at 04:31

## 2022-12-05 RX ADMIN — AMPICILLIN SODIUM AND SULBACTAM SODIUM 200 GRAM(S): 250; 125 INJECTION, POWDER, FOR SUSPENSION INTRAMUSCULAR; INTRAVENOUS at 09:58

## 2022-12-05 RX ADMIN — ENOXAPARIN SODIUM 40 MILLIGRAM(S): 100 INJECTION SUBCUTANEOUS at 14:26

## 2022-12-05 NOTE — PROGRESS NOTE ADULT - SUBJECTIVE AND OBJECTIVE BOX
INFECTIOUS DISEASES CONSULT FOLLOW-UP NOTE    INTERVAL HPI/OVERNIGHT EVENTS: ID reconsulted for BAL growing yeast and elevated BAL fungitell.     Subjective: Patient seen and examined at bedside. Reports respiratory symptoms have remained stable. Has occasional cough but denies worsening. Denies fevers, chills, abdominal pain, diarrhea.      ANTIBIOTICS/RELEVANT:    MEDICATIONS  (STANDING):  ampicillin/sulbactam  IVPB 3 Gram(s) IV Intermittent every 6 hours  budesonide 160 MICROgram(s)/formoterol 4.5 MICROgram(s) Inhaler 2 Puff(s) Inhalation two times a day  enoxaparin Injectable 40 milliGRAM(s) SubCutaneous every 24 hours    MEDICATIONS  (PRN):  acetaminophen     Tablet .. 650 milliGRAM(s) Oral every 6 hours PRN Temp greater or equal to 38C (100.4F), Mild Pain (1 - 3)  guaiFENesin Oral Liquid (Sugar-Free) 100 milliGRAM(s) Oral every 8 hours PRN Cough  melatonin 3 milliGRAM(s) Oral at bedtime PRN Insomnia        Vital Signs Last 24 Hrs  T(C): 36.6 (05 Dec 2022 10:03), Max: 36.6 (04 Dec 2022 22:21)  T(F): 97.8 (05 Dec 2022 10:03), Max: 97.9 (05 Dec 2022 05:30)  HR: 78 (05 Dec 2022 10:03) (77 - 93)  BP: 120/76 (05 Dec 2022 10:03) (106/62 - 120/76)  BP(mean): --  RR: 18 (05 Dec 2022 10:03) (18 - 18)  SpO2: 97% (05 Dec 2022 10:03) (96% - 97%)    Parameters below as of 05 Dec 2022 10:03  Patient On (Oxygen Delivery Method): room air        12-04-22 @ 07:01  -  12-05-22 @ 07:00  --------------------------------------------------------  IN: 200 mL / OUT: 160 mL / NET: 40 mL        PHYSICAL EXAM  Constitutional: alert, NAD  Eyes: the sclera and conjunctiva were normal.   ENT: the ears and nose were normal in appearance.   Neck: the appearance of the neck was normal and the neck was supple.   Pulmonary: no respiratory distress and lungs CTA bilaterally. Chest tube site c/d/i  Heart: heart rate was normal and rhythm regular, normal S1 and S2  Vascular: no peripheral edema  Abdomen: normal bowel sounds, soft, non-tender  Neurological: no focal deficits  Psychiatric: the affect was normal      LABS:                        9.6    10.62 )-----------( 925      ( 05 Dec 2022 07:10 )             31.2     12-05    141  |  105  |  5<L>  ----------------------------<  105<H>  4.7   |  31  |  0.62    Ca    9.0      05 Dec 2022 07:10  Phos  3.5     12-05  Mg     2.2     12-05    TPro  5.9<L>  /  Alb  2.3<L>  /  TBili  <0.2  /  DBili  x   /  AST  32  /  ALT  47<H>  /  AlkPhos  87  12-05    PT/INR - ( 04 Dec 2022 05:30 )   PT: 16.1 sec;   INR: 1.35          PTT - ( 04 Dec 2022 05:30 )  PTT:29.1 sec  Urinalysis Basic - ( 03 Dec 2022 19:01 )    Color: Yellow / Appearance: Clear / SG: <=1.005 / pH: x  Gluc: x / Ketone: NEGATIVE  / Bili: Negative / Urobili: 0.2 E.U./dL   Blood: x / Protein: NEGATIVE mg/dL / Nitrite: NEGATIVE   Leuk Esterase: NEGATIVE / RBC: < 5 /HPF / WBC < 5 /HPF   Sq Epi: x / Non Sq Epi: 0-5 /HPF / Bacteria: Present /HPF        MICROBIOLOGY:    Culture - Blood (collected 03 Dec 2022 18:10)  Source: .Blood Blood  Preliminary Report (04 Dec 2022 21:00):    No growth at 1 day.        RADIOLOGY & ADDITIONAL STUDIES:  Reviewed

## 2022-12-05 NOTE — PROGRESS NOTE ADULT - PROBLEM SELECTOR PLAN 1
Pt met 3/4 SIRS criteria on admission: HR 95, RR 22, WBC 19k. Pt with low-grade temperatures (Tmax 99F), acute on chronic cough, CP, dyspnea, and weakness x1 week. CXR with L pleural effusion. CT chest/abdomen at outside hospital on 11/23 with L pleural effusion and probable partially cystic mass at L lung base. Sx likely secondary to empyema. s/p zosyn 3.375 x1 in ED.   Ur legionella neg    Plan:   - Bronch performed 11/30 AM  - c/w zosyn 3.375g q6h  - C/w robitussin 100mg q8 for cough  - BCx NGTD  - F/u MRSA swab, RVP Pt met 3/4 SIRS criteria on admission: HR 95, RR 22, WBC 19k. Pt with low-grade temperatures (Tmax 99F), acute on chronic cough, CP, dyspnea, and weakness x1 week. CXR with L pleural effusion. CT chest/abdomen at outside hospital on 11/23 with L pleural effusion and probable partially cystic mass at L lung base. Sx likely secondary to empyema. s/p zosyn 3.375 x1 in ED.   Ur legionella neg    Plan:   - Bronch performed 11/30 AM  - c/w Unasyn 3g Q6  - C/w robitussin 100mg q8 for cough  - ID reconsulted  - BCx NGTD  - F/u MRSA swab, RVP

## 2022-12-05 NOTE — PROGRESS NOTE ADULT - ASSESSMENT
61y/o female with PMHx of Crohn's, diverticulosis, asthma, and benign breast cyst (in 1989) presented with acute on chronic cough, chest pain, and weakness. Was previously seen at [facility] for chest pain and dyspnea where CT chest and abd/pelvis reportedly showed moderate left pleural effusion, ?cystic mass of left lung base and was dx with empyema and discharged with augmentin and azithromycin for which patient took two days of (also admitted to being given one dose of meropenem by a paramedic family member via IV), started feeling ill with worsened weakness and presented to [facility]. While at [facility], patient was started on zosyn with ultrasound revealing KHADIJAH PNA loculated effusion for which a chest tube was placed with exudative output with no growth to date. Has remained on unasyn since last consult. s/p bronchoscopy with BAL 11/30 with yeast and BAL positive fungitell, ID reconsulted for recs.    11/30 BAL culture growing yeast which likely in setting of colonization of respiratory tract (candida) and elevated BAL fungitell is likely a reflection of the colonization.      Plan:  - c/w unasyn 3g IV q6h through 12/8 however will defer decision to extend abx course to pulm  - No indication for antifungal therapy at this time    ID team 1 will sign off. Please reconsult if further ID input is needed.

## 2022-12-05 NOTE — PROGRESS NOTE ADULT - PROBLEM SELECTOR PLAN 3
Pt with low-grade temperatures (Tmax 101.1) now 98.8F, acute on chronic cough, CP, dyspnea, and weakness x1 week. CXR with L pleural effusion. CT chest/abdomen at outside hospital on 11/23 with L pleural effusion and probable partially cystic mass at L lung base.     - s/p chest tube placement. fluid studies consistent with exudative effusion. May consider removing chest tube if output is minimal today.   Plan:   - Bronchoscopy 11/30  - f/u pleural fluid cytology  - f/u repeat CT chest Pt with low-grade temperatures (Tmax 101.1) now 98.8F, acute on chronic cough, CP, dyspnea, and weakness x1 week. CXR with L pleural effusion. CT chest/abdomen at outside hospital on 11/23 with L pleural effusion and probable partially cystic mass at L lung base.     - s/p chest tube placement. fluid studies consistent with exudative effusion. May consider removing chest tube if output is minimal today.   Plan:   - Bronchoscopy 11/30  - f/u pleural fluid cytology  - f/u repeat CT chest  - C/T A/P to assess for ascites that may be contributing

## 2022-12-05 NOTE — PROGRESS NOTE ADULT - PROBLEM SELECTOR PLAN 2
Pt with low-grade temperatures (Tmax 99F), acute on chronic cough, CP, dyspnea, and weakness x1 week. CXR with L pleural effusion. CT chest/abdomen at outside hospital on 11/23 with L pleural effusion and probable partially cystic mass at L lung base. Was diagnosed with empyema at outside hospital and sent home with Augmentin and azithromycin.  - c/w zosyn 3.375g q8h  - ID consulted, f/u recs Pt with low-grade temperatures (Tmax 99F), acute on chronic cough, CP, dyspnea, and weakness x1 week. CXR with L pleural effusion. CT chest/abdomen at outside hospital on 11/23 with L pleural effusion and probable partially cystic mass at L lung base. Was diagnosed with empyema at outside hospital and sent home with Augmentin and azithromycin.  - c/w Unasyn 3g Q6  - ID consulted, f/u recs

## 2022-12-05 NOTE — PROGRESS NOTE ADULT - SUBJECTIVE AND OBJECTIVE BOX
OVERNIGHT EVENTS: LAURENCE    SUBJECTIVE:  Patient seen and examined at bedside.  ROS: Patient denies h/n/v/d, fever, chills, cp, palpitations, sob, abd pain, leg swelling, rashes, dysuria, and changes in BM.     Vital Signs Last 12 Hrs  T(F): 97.8 (12-05-22 @ 10:03), Max: 97.9 (12-05-22 @ 05:30)  HR: 78 (12-05-22 @ 10:03) (77 - 78)  BP: 120/76 (12-05-22 @ 10:03) (106/62 - 120/76)  BP(mean): --  RR: 18 (12-05-22 @ 10:03) (18 - 18)  SpO2: 97% (12-05-22 @ 10:03) (97% - 97%)  I&O's Summary    04 Dec 2022 07:01  -  05 Dec 2022 07:00  --------------------------------------------------------  IN: 200 mL / OUT: 160 mL / NET: 40 mL        PHYSICAL EXAM:  Constitutional: NAD, comfortable in bed.  HEENT: NC/AT, PERRLA, EOMI, no conjunctival pallor or scleral icterus, MMM  Neck: Supple, no JVD  Respiratory: CTA B/L. No w/r/r.   Cardiovascular: RRR, normal S1 and S2, no m/r/g.   Gastrointestinal: +BS, soft NTND, no guarding or rebound tenderness, no palpable masses   Extremities: wwp; no cyanosis, clubbing or edema.   Vascular: Pulses equal and strong throughout.   Neurological: AAOx3, no CN deficits, strength and sensation intact throughout.   Skin: No gross skin abnormalities or rashes        LABS:                        9.6    10.62 )-----------( 925      ( 05 Dec 2022 07:10 )             31.2     12-05    141  |  105  |  5<L>  ----------------------------<  105<H>  4.7   |  31  |  0.62    Ca    9.0      05 Dec 2022 07:10  Phos  3.5     12-05  Mg     2.2     12-05    TPro  5.9<L>  /  Alb  2.3<L>  /  TBili  <0.2  /  DBili  x   /  AST  32  /  ALT  47<H>  /  AlkPhos  87  12-05    PT/INR - ( 04 Dec 2022 05:30 )   PT: 16.1 sec;   INR: 1.35          PTT - ( 04 Dec 2022 05:30 )  PTT:29.1 sec  Urinalysis Basic - ( 03 Dec 2022 19:01 )    Color: Yellow / Appearance: Clear / SG: <=1.005 / pH: x  Gluc: x / Ketone: NEGATIVE  / Bili: Negative / Urobili: 0.2 E.U./dL   Blood: x / Protein: NEGATIVE mg/dL / Nitrite: NEGATIVE   Leuk Esterase: NEGATIVE / RBC: < 5 /HPF / WBC < 5 /HPF   Sq Epi: x / Non Sq Epi: 0-5 /HPF / Bacteria: Present /HPF          RADIOLOGY & ADDITIONAL TESTS:    MEDICATIONS  (STANDING):  ampicillin/sulbactam  IVPB 3 Gram(s) IV Intermittent every 6 hours  budesonide 160 MICROgram(s)/formoterol 4.5 MICROgram(s) Inhaler 2 Puff(s) Inhalation two times a day  enoxaparin Injectable 40 milliGRAM(s) SubCutaneous every 24 hours    MEDICATIONS  (PRN):  acetaminophen     Tablet .. 650 milliGRAM(s) Oral every 6 hours PRN Temp greater or equal to 38C (100.4F), Mild Pain (1 - 3)  guaiFENesin Oral Liquid (Sugar-Free) 100 milliGRAM(s) Oral every 8 hours PRN Cough  melatonin 3 milliGRAM(s) Oral at bedtime PRN Insomnia

## 2022-12-05 NOTE — PROGRESS NOTE ADULT - TIME BILLING
ID reconsulted for BAL cx 11/30 growing yeast and elevated BAL Fungitell (1,3 beta-D-glucan). Elevation is likely in setting of yeast (probably Candida) colonization of respiratory tract; no indication for antifungal therapy. Patient endorses history R 5th finger onychomycosis which is unrelated/irrelavant to above and follow up with her outpatient dermatologist for routine care was recommended. Reactive thrombocytosis noted. Please refer to ID note from 12/2 for additional "final" recommendations.  Please reconsult again with ?

## 2022-12-05 NOTE — PROGRESS NOTE ADULT - TIME BILLING
Patient seen and examined with house-staff during bedside rounds.  Resident note read, including vitals, physical findings, laboratory data, and radiological reports.   Revisions included below.  Direct personal management at bed side and extensive interpretation of the data.  Plan was outlined and discussed in details with the housestaff.  Decision making of high complexity  Action taken for acute disease activity to reflect the level of care provided:  - medication reconciliation  - review laboratory data  I discussed the case in detail off with the family.  Patient has increased drainage from going up pleural space.  Chest x-ray was reviewed.  Cytology was negative from the pleural fluid and the bronchial wash.  ID to comment on the positive Fungitell.  My concern that the drainage if there patient pleural fluid with ambulation are not draining in the situs.  CT scan of the abdomen to rule out any abdominal pathology.  I will discuss with ID duration of the antibiotic.

## 2022-12-06 DIAGNOSIS — N85.8 OTHER SPECIFIED NONINFLAMMATORY DISORDERS OF UTERUS: ICD-10-CM

## 2022-12-06 DIAGNOSIS — Z86.2 PERSONAL HISTORY OF DISEASES OF THE BLOOD AND BLOOD-FORMING ORGANS AND CERTAIN DISORDERS INVOLVING THE IMMUNE MECHANISM: ICD-10-CM

## 2022-12-06 LAB
ANION GAP SERPL CALC-SCNC: 8 MMOL/L — SIGNIFICANT CHANGE UP (ref 5–17)
BASOPHILS # BLD AUTO: 0.04 K/UL — SIGNIFICANT CHANGE UP (ref 0–0.2)
BASOPHILS NFR BLD AUTO: 0.4 % — SIGNIFICANT CHANGE UP (ref 0–2)
BUN SERPL-MCNC: 4 MG/DL — LOW (ref 7–23)
CALCIUM SERPL-MCNC: 8.9 MG/DL — SIGNIFICANT CHANGE UP (ref 8.4–10.5)
CHLORIDE SERPL-SCNC: 102 MMOL/L — SIGNIFICANT CHANGE UP (ref 96–108)
CO2 SERPL-SCNC: 30 MMOL/L — SIGNIFICANT CHANGE UP (ref 22–31)
CREAT SERPL-MCNC: 0.61 MG/DL — SIGNIFICANT CHANGE UP (ref 0.5–1.3)
EGFR: 102 ML/MIN/1.73M2 — SIGNIFICANT CHANGE UP
EOSINOPHIL # BLD AUTO: 0.28 K/UL — SIGNIFICANT CHANGE UP (ref 0–0.5)
EOSINOPHIL NFR BLD AUTO: 2.8 % — SIGNIFICANT CHANGE UP (ref 0–6)
GLUCOSE SERPL-MCNC: 99 MG/DL — SIGNIFICANT CHANGE UP (ref 70–99)
HCT VFR BLD CALC: 32.2 % — LOW (ref 34.5–45)
HGB BLD-MCNC: 10.1 G/DL — LOW (ref 11.5–15.5)
IMM GRANULOCYTES NFR BLD AUTO: 1.3 % — HIGH (ref 0–0.9)
LYMPHOCYTES # BLD AUTO: 2.72 K/UL — SIGNIFICANT CHANGE UP (ref 1–3.3)
LYMPHOCYTES # BLD AUTO: 26.9 % — SIGNIFICANT CHANGE UP (ref 13–44)
MAGNESIUM SERPL-MCNC: 2 MG/DL — SIGNIFICANT CHANGE UP (ref 1.6–2.6)
MCHC RBC-ENTMCNC: 28.4 PG — SIGNIFICANT CHANGE UP (ref 27–34)
MCHC RBC-ENTMCNC: 31.4 GM/DL — LOW (ref 32–36)
MCV RBC AUTO: 90.4 FL — SIGNIFICANT CHANGE UP (ref 80–100)
MONOCYTES # BLD AUTO: 0.92 K/UL — HIGH (ref 0–0.9)
MONOCYTES NFR BLD AUTO: 9.1 % — SIGNIFICANT CHANGE UP (ref 2–14)
NEUTROPHILS # BLD AUTO: 6.03 K/UL — SIGNIFICANT CHANGE UP (ref 1.8–7.4)
NEUTROPHILS NFR BLD AUTO: 59.5 % — SIGNIFICANT CHANGE UP (ref 43–77)
NRBC # BLD: 0 /100 WBCS — SIGNIFICANT CHANGE UP (ref 0–0)
PHOSPHATE SERPL-MCNC: 3.2 MG/DL — SIGNIFICANT CHANGE UP (ref 2.5–4.5)
PLATELET # BLD AUTO: 1029 K/UL — CRITICAL HIGH (ref 150–400)
POTASSIUM SERPL-MCNC: 4 MMOL/L — SIGNIFICANT CHANGE UP (ref 3.5–5.3)
POTASSIUM SERPL-SCNC: 4 MMOL/L — SIGNIFICANT CHANGE UP (ref 3.5–5.3)
RBC # BLD: 3.56 M/UL — LOW (ref 3.8–5.2)
RBC # FLD: 13 % — SIGNIFICANT CHANGE UP (ref 10.3–14.5)
SODIUM SERPL-SCNC: 140 MMOL/L — SIGNIFICANT CHANGE UP (ref 135–145)
WBC # BLD: 10.12 K/UL — SIGNIFICANT CHANGE UP (ref 3.8–10.5)
WBC # FLD AUTO: 10.12 K/UL — SIGNIFICANT CHANGE UP (ref 3.8–10.5)

## 2022-12-06 PROCEDURE — 99233 SBSQ HOSP IP/OBS HIGH 50: CPT | Mod: GC

## 2022-12-06 RX ADMIN — AMPICILLIN SODIUM AND SULBACTAM SODIUM 200 GRAM(S): 250; 125 INJECTION, POWDER, FOR SUSPENSION INTRAMUSCULAR; INTRAVENOUS at 02:58

## 2022-12-06 RX ADMIN — BUDESONIDE AND FORMOTEROL FUMARATE DIHYDRATE 2 PUFF(S): 160; 4.5 AEROSOL RESPIRATORY (INHALATION) at 17:23

## 2022-12-06 RX ADMIN — ENOXAPARIN SODIUM 40 MILLIGRAM(S): 100 INJECTION SUBCUTANEOUS at 13:14

## 2022-12-06 RX ADMIN — AMPICILLIN SODIUM AND SULBACTAM SODIUM 200 GRAM(S): 250; 125 INJECTION, POWDER, FOR SUSPENSION INTRAMUSCULAR; INTRAVENOUS at 08:50

## 2022-12-06 RX ADMIN — AMPICILLIN SODIUM AND SULBACTAM SODIUM 200 GRAM(S): 250; 125 INJECTION, POWDER, FOR SUSPENSION INTRAMUSCULAR; INTRAVENOUS at 21:23

## 2022-12-06 RX ADMIN — BUDESONIDE AND FORMOTEROL FUMARATE DIHYDRATE 2 PUFF(S): 160; 4.5 AEROSOL RESPIRATORY (INHALATION) at 05:06

## 2022-12-06 RX ADMIN — AMPICILLIN SODIUM AND SULBACTAM SODIUM 200 GRAM(S): 250; 125 INJECTION, POWDER, FOR SUSPENSION INTRAMUSCULAR; INTRAVENOUS at 14:24

## 2022-12-06 NOTE — CONSULT NOTE ADULT - SUBJECTIVE AND OBJECTIVE BOX
Patient is a 62y old  Female who presents with a chief complaint of cough and weakness (04 Dec 2022 11:49)        HPI:  63 y/o F with PMHx of Crohn's, diverticulosis, asthma, and benign breast cyst (in 1989) presented with acute on chronic cough, chest pain, and weakness. Pt states she had a chronic intermittent cough with sputum production for 4 years; sputum varied in color but sometimes greenish. Reports increased cough frequency, increased sputum production, weakness, and decreased PO intake over the past week, with associated dyspnea and chest pain over the past 2 days. Also reports low-grade temperatures at home (Tmax 99F). Pt presented to her PCP 2 days ago, who sent her to the hospital. At the hospital, she had a CT chest and abdomen that demonstrated moderate L-sided pleural effusion, probably partially cystic mass at L lung base anteriorly, ~4.7cm, retroareolar R breast mass, moderately sized hiatal hernia, hepatomegaly, absent spleen, and diverticulosis. She was diagnosed with empyema and discharged home with Augmentin and azithromycin, but she  continued feeling weak so she presented to the ED today. Reports improvement in dyspnea while on 2L NC. Dyspnea is worse when lying down. No HA, dizziness, abd pain.     ED Course:   Vitals on Presentation: T99.8F | /89 | HR 95 | RR 22 | O2sat 93% on RA --> 98% on 2L NC  Labs notable for WBC 19.56, platelets 491  CXR: +large L pleural effusion   Interventions: Tylenol 650mg x1, Zosyn 3.375 x1 (25 Nov 2022 17:30)           PAST MEDICAL & SURGICAL HISTORY:  Prediabetes      Diverticulosis      Crohn&#x27;s disease      History of cholecystectomy      S/P parathyroidectomy      Benign cyst of breast            FAMILY HISTORY:  FH: lung cancer (Father)    Family history of CLL (chronic lymphoid leukemia) (Mother)        Social History:    Allergies    tetracyclines (Swelling)    Intolerances        MEDICATIONS  (STANDING):  ampicillin/sulbactam  IVPB 3 Gram(s) IV Intermittent every 6 hours  budesonide 160 MICROgram(s)/formoterol 4.5 MICROgram(s) Inhaler 2 Puff(s) Inhalation two times a day  enoxaparin Injectable 40 milliGRAM(s) SubCutaneous every 24 hours    MEDICATIONS  (PRN):  acetaminophen     Tablet .. 650 milliGRAM(s) Oral every 6 hours PRN Temp greater or equal to 38C (100.4F), Mild Pain (1 - 3)  guaiFENesin Oral Liquid (Sugar-Free) 100 milliGRAM(s) Oral every 8 hours PRN Cough  melatonin 3 milliGRAM(s) Oral at bedtime PRN Insomnia      Vital Signs Last 24 Hrs  T(C): 36.6 (06 Dec 2022 11:00), Max: 36.9 (06 Dec 2022 05:02)  T(F): 97.9 (06 Dec 2022 11:00), Max: 98.5 (06 Dec 2022 05:02)  HR: 87 (06 Dec 2022 11:00) (75 - 87)  BP: 126/75 (06 Dec 2022 11:00) (103/61 - 126/75)  BP(mean): --  RR: 19 (06 Dec 2022 11:00) (16 - 19)  SpO2: 94% (06 Dec 2022 11:00) (93% - 97%)    Parameters below as of 06 Dec 2022 11:00  Patient On (Oxygen Delivery Method): room air        ROS:  Negative except for:    PHYSICAL EXAM  General: adult in NAD  HEENT: clear oropharynx, anicteric sclera, pink conjunctiva  Neck: supple  CV: normal S1/S2 with no murmur rubs or gallops  Lungs: positive air movement b/l ant lungs,clear to auscultation, no wheezes, no rales  Abdomen: soft non-tender non-distended, no hepatosplenomegaly  Ext: no clubbing cyanosis or edema  Skin: no rashes and no petechiae  Neuro: alert and oriented X 4, no focal deficits      LABS:                          10.1   10.12 )-----------( 1029     ( 06 Dec 2022 08:14 )             32.2         Mean Cell Volume : 90.4 fl  Mean Cell Hemoglobin : 28.4 pg  Mean Cell Hemoglobin Concentration : 31.4 gm/dL  Auto Neutrophil # : 6.03 K/uL  Auto Lymphocyte # : 2.72 K/uL  Auto Monocyte # : 0.92 K/uL  Auto Eosinophil # : 0.28 K/uL  Auto Basophil # : 0.04 K/uL  Auto Neutrophil % : 59.5 %  Auto Lymphocyte % : 26.9 %  Auto Monocyte % : 9.1 %  Auto Eosinophil % : 2.8 %  Auto Basophil % : 0.4 %      Serial CBC's  12-06 @ 08:14  Hct-32.2 / Hgb-10.1 / Plat-1029 / RBC-3.56 / WBC-10.12  Serial CBC's  12-05 @ 07:10  Hct-31.2 / Hgb-9.6 / Plat-925 / RBC-3.35 / WBC-10.62  Serial CBC's  12-04 @ 05:30  Hct-31.3 / Hgb-10.0 / Plat-799 / RBC-3.45 / WBC-15.48  Serial CBC's  12-03 @ 06:27  Hct-34.4 / Hgb-10.7 / Plat-881 / RBC-3.74 / WBC-18.09      12-06    140  |  102  |  4<L>  ----------------------------<  99  4.0   |  30  |  0.61    Ca    8.9      06 Dec 2022 08:14  Phos  3.2     12-06  Mg     2.0     12-06    TPro  5.9<L>  /  Alb  2.3<L>  /  TBili  <0.2  /  DBili  x   /  AST  32  /  ALT  47<H>  /  AlkPhos  87  12-05                    LIVER FUNCTIONS - ( 05 Dec 2022 07:10 )  Alb: 2.3 g/dL / Pro: 5.9 g/dL / ALK PHOS: 87 U/L / ALT: 47 U/L / AST: 32 U/L / GGT: x           BLOOD SMEAR INTERPRETATION:       RADIOLOGY & ADDITIONAL STUDIES:    Assessment And Plan:     Patient is a 62y old  Female who presents with a chief complaint of cough and weakness (04 Dec 2022 11:49)        HPI:  63 y/o F with PMHx of Crohn's, diverticulosis, asthma, and benign breast cyst (in 1989) presented with acute on chronic cough, chest pain, and weakness. Pt states she had a chronic intermittent cough with sputum production for 4 years; sputum varied in color but sometimes greenish. Reports increased cough frequency, increased sputum production, weakness, and decreased PO intake over the past week, with associated dyspnea and chest pain over the past 2 days. Also reports low-grade temperatures at home (Tmax 99F). Pt presented to her PCP 2 days ago, who sent her to the hospital. At the hospital, she had a CT chest and abdomen that demonstrated moderate L-sided pleural effusion, probably partially cystic mass at L lung base anteriorly, ~4.7cm, retroareolar R breast mass, moderately sized hiatal hernia, hepatomegaly, absent spleen, and diverticulosis. She was diagnosed with empyema and discharged home with Augmentin and azithromycin, but she  continued feeling weak so she presented to the ED today. Reports improvement in dyspnea while on 2L NC. Dyspnea is worse when lying down. No HA, dizziness, abd pain.     ED Course:   Vitals on Presentation: T99.8F | /89 | HR 95 | RR 22 | O2sat 93% on RA --> 98% on 2L NC  Labs notable for WBC 19.56, platelets 491  CXR: +large L pleural effusion   Interventions: Tylenol 650mg x1, Zosyn 3.375 x1 (25 Nov 2022 17:30)           PAST MEDICAL & SURGICAL HISTORY:  Prediabetes      Diverticulosis      Crohn&#x27;s disease      History of cholecystectomy      S/P parathyroidectomy      Benign cyst of breast            FAMILY HISTORY:  FH: lung cancer (Father)    Family history of CLL (chronic lymphoid leukemia) (Mother)        Social History:    Allergies    tetracyclines (Swelling)    Intolerances        MEDICATIONS  (STANDING):  ampicillin/sulbactam  IVPB 3 Gram(s) IV Intermittent every 6 hours  budesonide 160 MICROgram(s)/formoterol 4.5 MICROgram(s) Inhaler 2 Puff(s) Inhalation two times a day  enoxaparin Injectable 40 milliGRAM(s) SubCutaneous every 24 hours    MEDICATIONS  (PRN):  acetaminophen     Tablet .. 650 milliGRAM(s) Oral every 6 hours PRN Temp greater or equal to 38C (100.4F), Mild Pain (1 - 3)  guaiFENesin Oral Liquid (Sugar-Free) 100 milliGRAM(s) Oral every 8 hours PRN Cough  melatonin 3 milliGRAM(s) Oral at bedtime PRN Insomnia      Vital Signs Last 24 Hrs  T(C): 36.6 (06 Dec 2022 11:00), Max: 36.9 (06 Dec 2022 05:02)  T(F): 97.9 (06 Dec 2022 11:00), Max: 98.5 (06 Dec 2022 05:02)  HR: 87 (06 Dec 2022 11:00) (75 - 87)  BP: 126/75 (06 Dec 2022 11:00) (103/61 - 126/75)  BP(mean): --  RR: 19 (06 Dec 2022 11:00) (16 - 19)  SpO2: 94% (06 Dec 2022 11:00) (93% - 97%)    Parameters below as of 06 Dec 2022 11:00  Patient On (Oxygen Delivery Method): room air        ROS:  Negative except for:    PHYSICAL EXAM  General: adult in NAD  HEENT: clear oropharynx, anicteric sclera, pink conjunctiva  Neck: supple  CV: normal S1/S2 with no murmur rubs or gallops  Lungs: positive air movement b/l ant lungs,clear to auscultation, no wheezes, no rales, CT in place  Abdomen: soft non-tender non-distended, no hepatosplenomegaly  Ext: no clubbing cyanosis or edema  Skin: no rashes and no petechiae  Neuro: alert and oriented X 4, no focal deficits      LABS:                          10.1   10.12 )-----------( 1029     ( 06 Dec 2022 08:14 )             32.2         Mean Cell Volume : 90.4 fl  Mean Cell Hemoglobin : 28.4 pg  Mean Cell Hemoglobin Concentration : 31.4 gm/dL  Auto Neutrophil # : 6.03 K/uL  Auto Lymphocyte # : 2.72 K/uL  Auto Monocyte # : 0.92 K/uL  Auto Eosinophil # : 0.28 K/uL  Auto Basophil # : 0.04 K/uL  Auto Neutrophil % : 59.5 %  Auto Lymphocyte % : 26.9 %  Auto Monocyte % : 9.1 %  Auto Eosinophil % : 2.8 %  Auto Basophil % : 0.4 %      Serial CBC's  12-06 @ 08:14  Hct-32.2 / Hgb-10.1 / Plat-1029 / RBC-3.56 / WBC-10.12  Serial CBC's  12-05 @ 07:10  Hct-31.2 / Hgb-9.6 / Plat-925 / RBC-3.35 / WBC-10.62  Serial CBC's  12-04 @ 05:30  Hct-31.3 / Hgb-10.0 / Plat-799 / RBC-3.45 / WBC-15.48  Serial CBC's  12-03 @ 06:27  Hct-34.4 / Hgb-10.7 / Plat-881 / RBC-3.74 / WBC-18.09      12-06    140  |  102  |  4<L>  ----------------------------<  99  4.0   |  30  |  0.61    Ca    8.9      06 Dec 2022 08:14  Phos  3.2     12-06  Mg     2.0     12-06    TPro  5.9<L>  /  Alb  2.3<L>  /  TBili  <0.2  /  DBili  x   /  AST  32  /  ALT  47<H>  /  AlkPhos  87  12-05                    LIVER FUNCTIONS - ( 05 Dec 2022 07:10 )  Alb: 2.3 g/dL / Pro: 5.9 g/dL / ALK PHOS: 87 U/L / ALT: 47 U/L / AST: 32 U/L / GGT: x           BLOOD SMEAR INTERPRETATION:       RADIOLOGY & ADDITIONAL STUDIES:    < from: CT Abdomen and Pelvis w/ IV Cont (12.05.22 @ 23:06) >  IMPRESSION:  Endometrial tumor, polyp or submucosal fibroid. Recommend pelvic   ultrasound correlation.  Unchanged left basilar pneumonia. Small residual pleural fluid including   anterior and fissural loculated components, with resolved basilar   pneumothorax.  Enlarged liver.    < end of copied text >      < from: CT Chest No Cont (12.01.22 @ 10:28) >  IMPRESSION:    1. Large left basilar consolidation. Small amount of loculated fluid in   the left major fissure.  2. Small left pneumothorax with a smallamount of debris and/or pleural   fluid in the posterior left lung base. Status post left pigtail chest   tube placement.  3. Mediastinal lymphadenopathy, most likely reactive.  4. Small apical pericardial effusion.    < end of copied text >

## 2022-12-06 NOTE — PROGRESS NOTE ADULT - PROBLEM SELECTOR PLAN 3
Pt with low-grade temperatures (Tmax 99F), acute on chronic cough, CP, dyspnea, and weakness x1 week. CXR with L pleural effusion. CT chest/abdomen at outside hospital on 11/23 with L pleural effusion and probable partially cystic mass at L lung base. Was diagnosed with empyema at outside hospital and sent home with Augmentin and azithromycin.  - c/w Unasyn 3g Q6  - ID consulted, f/u recs

## 2022-12-06 NOTE — PROGRESS NOTE ADULT - PROBLEM SELECTOR PLAN 1
CTAP demonstrated finding in uterine c/f Endometrial tumor, polyp or submucosal fibroid. Transvaginal US ordered.     - Heme onc c/s, appreciate recs  - Pending PET scan

## 2022-12-06 NOTE — PROGRESS NOTE ADULT - TIME BILLING
Patient seen and examined with house-staff during bedside rounds.  Resident note read, including vitals, physical findings, laboratory data, and radiological reports.   Revisions included below.  Direct personal management at bed side and extensive interpretation of the data.  Plan was outlined and discussed in details with the housestaff.  Decision making of high complexity  Action taken for acute disease activity to reflect the level of care provided:  - medication reconciliation  - review laboratory data      I reviewed the CT scan of the abdomen.  There is worsening of the finding on the left lower lobe with the loculation effusion which is different from the previous CT scan also there is an element of uterine mass and the CT scan of the chest revealed left breast mass.  Vaginal ultrasound.  Hematology consult    thrombocytosis which is active and could be possibility if there to underlying inflammatory versus infectious process.  PET scan to rule out malignancy.    Continue antibiotic.  The patient hemodynamically stable        From the left chest tube decreased and will observe for the next 24 hours after less than 150 with then we will remove the chest tube

## 2022-12-06 NOTE — PROGRESS NOTE ADULT - PROBLEM SELECTOR PLAN 4
Pt with low-grade temperatures (Tmax 101.1) now 98.8F, acute on chronic cough, CP, dyspnea, and weakness x1 week. CXR with L pleural effusion. CT chest/abdomen at outside hospital on 11/23 with L pleural effusion and probable partially cystic mass at L lung base.     - s/p chest tube placement. fluid studies consistent with exudative effusion. May consider removing chest tube if output is minimal today.   Plan:   - Bronchoscopy 11/30  - f/u pleural fluid cytology  - f/u repeat CT chest  - C/T A/P to assess for ascites that may be contributing

## 2022-12-06 NOTE — PROGRESS NOTE ADULT - PROBLEM SELECTOR PLAN 2
Pt met 3/4 SIRS criteria on admission: HR 95, RR 22, WBC 19k. Pt with low-grade temperatures (Tmax 99F), acute on chronic cough, CP, dyspnea, and weakness x1 week. CXR with L pleural effusion. CT chest/abdomen at outside hospital on 11/23 with L pleural effusion and probable partially cystic mass at L lung base. Sx likely secondary to empyema. s/p zosyn 3.375 x1 in ED.   Ur legionella neg    Plan:   - Bronch performed 11/30 AM  - c/w Unasyn 3g Q6  - C/w robitussin 100mg q8 for cough  - ID reconsulted  - BCx NGTD  - F/u MRSA swab, RVP

## 2022-12-06 NOTE — CHART NOTE - NSCHARTNOTEFT_GEN_A_CORE
Admitting Diagnosis:   Patient is a 62y old  Female who presents with a chief complaint of productive cough and weakness    PAST MEDICAL & SURGICAL HISTORY:  Prediabetes    Diverticulosis    Crohn&#x27;s disease    History of cholecystectomy    S/P parathyroidectomy    Benign cyst of breast    Current Nutrition Order: Full liquids (Kosher)    PO Intake: Good (%) [   ]  Fair (50-75%) [ x ] Poor (<25%) [   ]    GI Issues:   No N/V/D/C  Noted hx of Crohn's    Pain:  No pain noted    Skin Integrity:  Terrell score 20    Labs:       141  |  105  |  5<L>  ----------------------------<  105<H>  4.7   |  31  |  0.62    Ca    9.0      05 Dec 2022 07:10  Phos  3.5       Mg     2.2         TPro  5.9<L>  /  Alb  2.3<L>  /  TBili  <0.2  /  DBili  x   /  AST  32  /  ALT  47<H>  /  AlkPhos  87      Medications:  MEDICATIONS  (STANDING):  ampicillin/sulbactam  IVPB 3 Gram(s) IV Intermittent every 6 hours  budesonide 160 MICROgram(s)/formoterol 4.5 MICROgram(s) Inhaler 2 Puff(s) Inhalation two times a day  enoxaparin Injectable 40 milliGRAM(s) SubCutaneous every 24 hours    MEDICATIONS  (PRN):  acetaminophen     Tablet .. 650 milliGRAM(s) Oral every 6 hours PRN Temp greater or equal to 38C (100.4F), Mild Pain (1 - 3)  guaiFENesin Oral Liquid (Sugar-Free) 100 milliGRAM(s) Oral every 8 hours PRN Cough  melatonin 3 milliGRAM(s) Oral at bedtime PRN Insomnia    Admission Anthropometrics:  Height for BMI (FEET)	5 Feet  Height for BMI (INCHES)	9 Inch(s)  Height for BMI (CENTIMETERS)	175.26 Centimeter(s)  Weight for BMI (lbs)	191 lb  Weight for BMI (kg)	86.6 kg  Body Mass Index	28.1    Weight Change: Pt reports UBW 186lbs, current wt 191lbs reflects 5lb fluctuation.     Estimated energy needs:   IBW used for calculations as pt >120% of IBW (131%), adjusted for age  25-30kcal/k-1971kcal  1-1.2g/k-79gprotein  Fluids per team    Subjective:   63 y/o F with PMHx of Crohn's, diverticulosis, asthma, and benign breast cyst (in ) presented with acute on chronic cough, weakness, dyspnea, and chest pain x 1 week, CXR with L pleural effusion, found to meet SIRS criteria, likely 2/2 empyema and L pleural effusion. s/p chest tube placement and bronchoscopy.     Pt assessed at bedside. OOB in chair, eating breakfast. Pt on full liquids since . Eating oatmeal at time of visit and observed multiple juices/shakes at bedside. Reports good tolerance, feeling hungry and wanting to optimize nutrition. Reviewed oral nutrition supplements and pt amenable to trialing ensure clear and ensure enlive. Will continue to monitor PO intake. Please see full nutrition recommendations below. Will continue to follow per RD protocol.     Previous Nutrition Diagnosis: Inadequate oral intake RT PO <EER AEB full liquid diet, restricted PO  Active [ X ]  Resolved [  ]    Recommendations:  1. Continue full liquid diet (kosher) diet until medically feasible to advance to regular diet (kosher)  2. Recommend add ensure clear BID, ensure enlive once/day  3. Consider bowel regimen  4. Honor food preferences as able  5. RD to remain available prn    Education: Encouraged PO intake, discussed oral nutrition supplements    Risk Level: High [   ] Moderate [ X ] Low [   ].

## 2022-12-06 NOTE — CONSULT NOTE ADULT - ASSESSMENT
Thrombocytosis, primary vs reactive  --Obtain iron studies   --Check Jak2, MPL, CALR, BCR/ABL, flow   --Patient is asplenic, unclear why, she denies h/o splenectomy, spleen appears atrophic, this will contribute to thrombocytosis  --[facility] records were reviewed, mild thrombocytosis was noted in 2021 (mid 400K range)   --Patient denies h/o bleeding/thrombotic complications     Uterine ?mass/polyp/intramural fibroid  --Pelvic US, may benefit from MRI pelvis, GYN eval for biopsy, can be deferred for outpatient work up     Breast abnormality noted on CT  --Neg mammo in 2016, no recent mammograms   --Mammo/breast surgery referral can be done as outpatient     Pleural effusion  --Cytology is neg for malignancy   --Consolidation, ? PNA, attention to follow up       Will follow, discussed with housestaff

## 2022-12-06 NOTE — PROGRESS NOTE ADULT - SUBJECTIVE AND OBJECTIVE BOX
**INCOMPLETE NOTE    OVERNIGHT EVENTS:    SUBJECTIVE:  Patient seen and examined at bedside.  ROS: Patient denies h/n/v/d, fever, chills, cp, palpitations, sob, abd pain, leg swelling, rashes, dysuria, and changes in BM.     Vital Signs Last 12 Hrs  T(F): 98.5 (12-06-22 @ 05:02), Max: 98.5 (12-06-22 @ 05:02)  HR: 83 (12-06-22 @ 05:02) (83 - 83)  BP: 103/61 (12-06-22 @ 05:02) (103/61 - 103/61)  BP(mean): --  RR: 16 (12-06-22 @ 05:02) (16 - 16)  SpO2: 93% (12-06-22 @ 05:02) (93% - 93%)  I&O's Summary      PHYSICAL EXAM:  Constitutional: NAD, comfortable in bed.  HEENT: NC/AT, PERRLA, EOMI, no conjunctival pallor or scleral icterus, MMM  Neck: Supple, no JVD  Respiratory: CTA B/L. No w/r/r.   Cardiovascular: RRR, normal S1 and S2, no m/r/g.   Gastrointestinal: +BS, soft NTND, no guarding or rebound tenderness, no palpable masses   Extremities: wwp; no cyanosis, clubbing or edema.   Vascular: Pulses equal and strong throughout.   Neurological: AAOx3, no CN deficits, strength and sensation intact throughout.   Skin: No gross skin abnormalities or rashes        LABS:                        10.1   10.12 )-----------( 1029     ( 06 Dec 2022 08:14 )             32.2     12-06    140  |  102  |  4<L>  ----------------------------<  99  4.0   |  30  |  0.61    Ca    8.9      06 Dec 2022 08:14  Phos  3.2     12-06  Mg     2.0     12-06    TPro  5.9<L>  /  Alb  2.3<L>  /  TBili  <0.2  /  DBili  x   /  AST  32  /  ALT  47<H>  /  AlkPhos  87  12-05            RADIOLOGY & ADDITIONAL TESTS:    MEDICATIONS  (STANDING):  ampicillin/sulbactam  IVPB 3 Gram(s) IV Intermittent every 6 hours  budesonide 160 MICROgram(s)/formoterol 4.5 MICROgram(s) Inhaler 2 Puff(s) Inhalation two times a day  enoxaparin Injectable 40 milliGRAM(s) SubCutaneous every 24 hours    MEDICATIONS  (PRN):  acetaminophen     Tablet .. 650 milliGRAM(s) Oral every 6 hours PRN Temp greater or equal to 38C (100.4F), Mild Pain (1 - 3)  guaiFENesin Oral Liquid (Sugar-Free) 100 milliGRAM(s) Oral every 8 hours PRN Cough  melatonin 3 milliGRAM(s) Oral at bedtime PRN Insomnia   OVERNIGHT EVENTS: LAURENCE    SUBJECTIVE:  Patient seen and examined at bedside.  ROS: Patient denies h/n/v/d, fever, chills, cp, palpitations, sob, abd pain, leg swelling, rashes, dysuria, and changes in BM. Pt endorses mild pain over chest tube site.    Vital Signs Last 12 Hrs  T(F): 98.5 (12-06-22 @ 05:02), Max: 98.5 (12-06-22 @ 05:02)  HR: 83 (12-06-22 @ 05:02) (83 - 83)  BP: 103/61 (12-06-22 @ 05:02) (103/61 - 103/61)  BP(mean): --  RR: 16 (12-06-22 @ 05:02) (16 - 16)  SpO2: 93% (12-06-22 @ 05:02) (93% - 93%)  I&O's Summary      PHYSICAL EXAM:  Constitutional: NAD, comfortable in bed.  HEENT: NC/AT, PERRLA, EOMI, no conjunctival pallor or scleral icterus, MMM  Neck: Supple, no JVD  Respiratory: CTA B/L. No w/r/r.   Cardiovascular: RRR, normal S1 and S2, no m/r/g.   Gastrointestinal: +BS, soft NTND, no guarding or rebound tenderness, no palpable masses   Extremities: wwp; no cyanosis, clubbing or edema.   Vascular: Pulses equal and strong throughout.   Neurological: AAOx3, no CN deficits, strength and sensation intact throughout.   Skin: No gross skin abnormalities or rashes        LABS:                        10.1   10.12 )-----------( 1029     ( 06 Dec 2022 08:14 )             32.2     12-06    140  |  102  |  4<L>  ----------------------------<  99  4.0   |  30  |  0.61    Ca    8.9      06 Dec 2022 08:14  Phos  3.2     12-06  Mg     2.0     12-06    TPro  5.9<L>  /  Alb  2.3<L>  /  TBili  <0.2  /  DBili  x   /  AST  32  /  ALT  47<H>  /  AlkPhos  87  12-05            RADIOLOGY & ADDITIONAL TESTS:    MEDICATIONS  (STANDING):  ampicillin/sulbactam  IVPB 3 Gram(s) IV Intermittent every 6 hours  budesonide 160 MICROgram(s)/formoterol 4.5 MICROgram(s) Inhaler 2 Puff(s) Inhalation two times a day  enoxaparin Injectable 40 milliGRAM(s) SubCutaneous every 24 hours    MEDICATIONS  (PRN):  acetaminophen     Tablet .. 650 milliGRAM(s) Oral every 6 hours PRN Temp greater or equal to 38C (100.4F), Mild Pain (1 - 3)  guaiFENesin Oral Liquid (Sugar-Free) 100 milliGRAM(s) Oral every 8 hours PRN Cough  melatonin 3 milliGRAM(s) Oral at bedtime PRN Insomnia

## 2022-12-07 ENCOUNTER — RESULT REVIEW (OUTPATIENT)
Age: 62
End: 2022-12-07

## 2022-12-07 LAB
ANION GAP SERPL CALC-SCNC: 8 MMOL/L — SIGNIFICANT CHANGE UP (ref 5–17)
BASOPHILS # BLD AUTO: 0.06 K/UL — SIGNIFICANT CHANGE UP (ref 0–0.2)
BASOPHILS NFR BLD AUTO: 0.6 % — SIGNIFICANT CHANGE UP (ref 0–2)
BUN SERPL-MCNC: 5 MG/DL — LOW (ref 7–23)
CALCIUM SERPL-MCNC: 9.2 MG/DL — SIGNIFICANT CHANGE UP (ref 8.4–10.5)
CHLORIDE SERPL-SCNC: 103 MMOL/L — SIGNIFICANT CHANGE UP (ref 96–108)
CO2 SERPL-SCNC: 31 MMOL/L — SIGNIFICANT CHANGE UP (ref 22–31)
CREAT SERPL-MCNC: 0.64 MG/DL — SIGNIFICANT CHANGE UP (ref 0.5–1.3)
EGFR: 101 ML/MIN/1.73M2 — SIGNIFICANT CHANGE UP
EOSINOPHIL # BLD AUTO: 0.24 K/UL — SIGNIFICANT CHANGE UP (ref 0–0.5)
EOSINOPHIL NFR BLD AUTO: 2.5 % — SIGNIFICANT CHANGE UP (ref 0–6)
FERRITIN SERPL-MCNC: 211 NG/ML — HIGH (ref 15–150)
FOLATE SERPL-MCNC: 7.7 NG/ML — SIGNIFICANT CHANGE UP
GALACTOMANNAN AG SERPL-ACNC: 0.3 INDEX — SIGNIFICANT CHANGE UP (ref 0–0.49)
GLUCOSE SERPL-MCNC: 136 MG/DL — HIGH (ref 70–99)
HAPTOGLOB SERPL-MCNC: 512 MG/DL — HIGH (ref 34–200)
HCT VFR BLD CALC: 34.3 % — LOW (ref 34.5–45)
HGB BLD-MCNC: 10.4 G/DL — LOW (ref 11.5–15.5)
IMM GRANULOCYTES NFR BLD AUTO: 1.8 % — HIGH (ref 0–0.9)
IRON SATN MFR SERPL: 23 % — SIGNIFICANT CHANGE UP (ref 14–50)
IRON SATN MFR SERPL: 46 UG/DL — SIGNIFICANT CHANGE UP (ref 30–160)
LDH SERPL L TO P-CCNC: 146 U/L — SIGNIFICANT CHANGE UP (ref 50–242)
LYMPHOCYTES # BLD AUTO: 2.71 K/UL — SIGNIFICANT CHANGE UP (ref 1–3.3)
LYMPHOCYTES # BLD AUTO: 28.7 % — SIGNIFICANT CHANGE UP (ref 13–44)
MAGNESIUM SERPL-MCNC: 2 MG/DL — SIGNIFICANT CHANGE UP (ref 1.6–2.6)
MCHC RBC-ENTMCNC: 28.2 PG — SIGNIFICANT CHANGE UP (ref 27–34)
MCHC RBC-ENTMCNC: 30.3 GM/DL — LOW (ref 32–36)
MCV RBC AUTO: 93 FL — SIGNIFICANT CHANGE UP (ref 80–100)
MONOCYTES # BLD AUTO: 1.07 K/UL — HIGH (ref 0–0.9)
MONOCYTES NFR BLD AUTO: 11.3 % — SIGNIFICANT CHANGE UP (ref 2–14)
NEUTROPHILS # BLD AUTO: 5.19 K/UL — SIGNIFICANT CHANGE UP (ref 1.8–7.4)
NEUTROPHILS NFR BLD AUTO: 55.1 % — SIGNIFICANT CHANGE UP (ref 43–77)
NRBC # BLD: 0 /100 WBCS — SIGNIFICANT CHANGE UP (ref 0–0)
PHOSPHATE SERPL-MCNC: 3.2 MG/DL — SIGNIFICANT CHANGE UP (ref 2.5–4.5)
PLATELET # BLD AUTO: 1106 K/UL — CRITICAL HIGH (ref 150–400)
POTASSIUM SERPL-MCNC: 4.5 MMOL/L — SIGNIFICANT CHANGE UP (ref 3.5–5.3)
POTASSIUM SERPL-SCNC: 4.5 MMOL/L — SIGNIFICANT CHANGE UP (ref 3.5–5.3)
RBC # BLD: 3.69 M/UL — LOW (ref 3.8–5.2)
RBC # BLD: 3.69 M/UL — LOW (ref 3.8–5.2)
RBC # FLD: 13.3 % — SIGNIFICANT CHANGE UP (ref 10.3–14.5)
RETICS #: 65.3 K/UL — SIGNIFICANT CHANGE UP (ref 25–125)
RETICS/RBC NFR: 1.8 % — SIGNIFICANT CHANGE UP (ref 0.5–2.5)
SODIUM SERPL-SCNC: 142 MMOL/L — SIGNIFICANT CHANGE UP (ref 135–145)
TIBC SERPL-MCNC: 204 UG/DL — LOW (ref 220–430)
UIBC SERPL-MCNC: 158 UG/DL — SIGNIFICANT CHANGE UP (ref 110–370)
VIT B12 SERPL-MCNC: 417 PG/ML — SIGNIFICANT CHANGE UP (ref 232–1245)
WBC # BLD: 9.44 K/UL — SIGNIFICANT CHANGE UP (ref 3.8–10.5)
WBC # FLD AUTO: 9.44 K/UL — SIGNIFICANT CHANGE UP (ref 3.8–10.5)

## 2022-12-07 PROCEDURE — 71045 X-RAY EXAM CHEST 1 VIEW: CPT | Mod: 26

## 2022-12-07 PROCEDURE — 99232 SBSQ HOSP IP/OBS MODERATE 35: CPT | Mod: GC

## 2022-12-07 PROCEDURE — 88300 SURGICAL PATH GROSS: CPT | Mod: 26

## 2022-12-07 RX ADMIN — ENOXAPARIN SODIUM 40 MILLIGRAM(S): 100 INJECTION SUBCUTANEOUS at 15:30

## 2022-12-07 RX ADMIN — AMPICILLIN SODIUM AND SULBACTAM SODIUM 200 GRAM(S): 250; 125 INJECTION, POWDER, FOR SUSPENSION INTRAMUSCULAR; INTRAVENOUS at 03:36

## 2022-12-07 RX ADMIN — AMPICILLIN SODIUM AND SULBACTAM SODIUM 200 GRAM(S): 250; 125 INJECTION, POWDER, FOR SUSPENSION INTRAMUSCULAR; INTRAVENOUS at 08:59

## 2022-12-07 RX ADMIN — BUDESONIDE AND FORMOTEROL FUMARATE DIHYDRATE 2 PUFF(S): 160; 4.5 AEROSOL RESPIRATORY (INHALATION) at 21:52

## 2022-12-07 RX ADMIN — AMPICILLIN SODIUM AND SULBACTAM SODIUM 200 GRAM(S): 250; 125 INJECTION, POWDER, FOR SUSPENSION INTRAMUSCULAR; INTRAVENOUS at 21:50

## 2022-12-07 RX ADMIN — BUDESONIDE AND FORMOTEROL FUMARATE DIHYDRATE 2 PUFF(S): 160; 4.5 AEROSOL RESPIRATORY (INHALATION) at 05:30

## 2022-12-07 RX ADMIN — AMPICILLIN SODIUM AND SULBACTAM SODIUM 200 GRAM(S): 250; 125 INJECTION, POWDER, FOR SUSPENSION INTRAMUSCULAR; INTRAVENOUS at 15:30

## 2022-12-07 NOTE — PROGRESS NOTE ADULT - ASSESSMENT
Thrombocytosis, primary vs reactive  --Iron studies are WNL  --Check Jak2, MPL, CALR, BCR/ABL, flow   --Patient is asplenic, unclear why, she denies h/o splenectomy, spleen appears atrophic, this will contribute to thrombocytosis  --[facility] records were reviewed, mild thrombocytosis was noted in 2021 (mid 400K range)   --Patient denies h/o bleeding/thrombotic complications   --DVT ppx per protocol     Uterine ?mass/polyp/intramural fibroid  --Pelvic US, may benefit from MRI pelvis, GYN eval for biopsy, can be deferred for outpatient work up   --Prior work up in [place] in 2018, asked family to bring records/images     Breast abnormality noted on CT  --Neg mammo in 2016, no recent mammograms   --Mammo/breast surgery referral can be done as outpatient   --PET CT ordered, pending     Pleural effusion  --Cytology is neg for malignancy   --Consolidation, ? PNA, attention to follow up       Will follow, discussed with housestaff and primary attending

## 2022-12-07 NOTE — PROGRESS NOTE ADULT - SUBJECTIVE AND OBJECTIVE BOX
OVERNIGHT EVENTS: LAURENCE     SUBJECTIVE:  Patient seen and examined at bedside.  ROS: Patient denies h/n/v/d, fever, chills, cp, palpitations, sob, abd pain, leg swelling, rashes, dysuria, and changes in BM.     Vital Signs Last 12 Hrs  T(F): 98.2 (12-07-22 @ 05:21), Max: 98.2 (12-07-22 @ 05:21)  HR: 83 (12-07-22 @ 05:21) (83 - 83)  BP: 116/74 (12-07-22 @ 05:21) (116/74 - 116/74)  BP(mean): --  RR: 18 (12-07-22 @ 05:21) (18 - 18)  SpO2: 93% (12-07-22 @ 05:21) (93% - 93%)  I&O's Summary    06 Dec 2022 07:01  -  07 Dec 2022 07:00  --------------------------------------------------------  IN: 0 mL / OUT: 125 mL / NET: -125 mL        PHYSICAL EXAM:  Constitutional: NAD, comfortable in bed.  HEENT: NC/AT, PERRLA, EOMI, no conjunctival pallor or scleral icterus, MMM  Neck: Supple, no JVD  Respiratory: CTA B/L. No w/r/r. L chest tube intact.  Cardiovascular: RRR, normal S1 and S2, no m/r/g.   Gastrointestinal: +BS, soft NTND, no guarding or rebound tenderness, no palpable masses   Extremities: wwp; no cyanosis, clubbing or edema.   Vascular: Pulses equal and strong throughout.   Neurological: AAOx3, no CN deficits, strength and sensation intact throughout.   Skin: No gross skin abnormalities or rashes        LABS:                        10.4   9.44  )-----------( 1106     ( 07 Dec 2022 10:07 )             34.3     12-06    140  |  102  |  4<L>  ----------------------------<  99  4.0   |  30  |  0.61    Ca    8.9      06 Dec 2022 08:14  Phos  3.2     12-06  Mg     2.0     12-06              RADIOLOGY & ADDITIONAL TESTS:    MEDICATIONS  (STANDING):  ampicillin/sulbactam  IVPB 3 Gram(s) IV Intermittent every 6 hours  budesonide 160 MICROgram(s)/formoterol 4.5 MICROgram(s) Inhaler 2 Puff(s) Inhalation two times a day  enoxaparin Injectable 40 milliGRAM(s) SubCutaneous every 24 hours    MEDICATIONS  (PRN):  acetaminophen     Tablet .. 650 milliGRAM(s) Oral every 6 hours PRN Temp greater or equal to 38C (100.4F), Mild Pain (1 - 3)  guaiFENesin Oral Liquid (Sugar-Free) 100 milliGRAM(s) Oral every 8 hours PRN Cough  melatonin 3 milliGRAM(s) Oral at bedtime PRN Insomnia

## 2022-12-07 NOTE — PROGRESS NOTE ADULT - SUBJECTIVE AND OBJECTIVE BOX
Patient is a 62y old  Female who presents with a chief complaint of cough and weakness (04 Dec 2022 11:49)        HPI:  61 y/o F with PMHx of Crohn's, diverticulosis, asthma, and benign breast cyst (in 1989) presented with acute on chronic cough, chest pain, and weakness. Pt states she had a chronic intermittent cough with sputum production for 4 years; sputum varied in color but sometimes greenish. Reports increased cough frequency, increased sputum production, weakness, and decreased PO intake over the past week, with associated dyspnea and chest pain over the past 2 days. Also reports low-grade temperatures at home (Tmax 99F). Pt presented to her PCP 2 days ago, who sent her to the hospital. At the hospital, she had a CT chest and abdomen that demonstrated moderate L-sided pleural effusion, probably partially cystic mass at L lung base anteriorly, ~4.7cm, retroareolar R breast mass, moderately sized hiatal hernia, hepatomegaly, absent spleen, and diverticulosis. She was diagnosed with empyema and discharged home with Augmentin and azithromycin, but she  continued feeling weak so she presented to the ED today. Reports improvement in dyspnea while on 2L NC. Dyspnea is worse when lying down. No HA, dizziness, abd pain.     ED Course:   Vitals on Presentation: T99.8F | /89 | HR 95 | RR 22 | O2sat 93% on RA --> 98% on 2L NC  Labs notable for WBC 19.56, platelets 491  CXR: +large L pleural effusion   Interventions: Tylenol 650mg x1, Zosyn 3.375 x1 (25 Nov 2022 17:30)           PAST MEDICAL & SURGICAL HISTORY:  Prediabetes      Diverticulosis      Crohn&#x27;s disease      History of cholecystectomy      S/P parathyroidectomy      Benign cyst of breast            FAMILY HISTORY:  FH: lung cancer (Father)    Family history of CLL (chronic lymphoid leukemia) (Mother)        Social History:    Allergies    tetracyclines (Swelling)    Intolerances      MEDICATIONS  (STANDING):  ampicillin/sulbactam  IVPB 3 Gram(s) IV Intermittent every 6 hours  budesonide 160 MICROgram(s)/formoterol 4.5 MICROgram(s) Inhaler 2 Puff(s) Inhalation two times a day  enoxaparin Injectable 40 milliGRAM(s) SubCutaneous every 24 hours    MEDICATIONS  (PRN):  acetaminophen     Tablet .. 650 milliGRAM(s) Oral every 6 hours PRN Temp greater or equal to 38C (100.4F), Mild Pain (1 - 3)  guaiFENesin Oral Liquid (Sugar-Free) 100 milliGRAM(s) Oral every 8 hours PRN Cough  melatonin 3 milliGRAM(s) Oral at bedtime PRN Insomnia    ICU Vital Signs Last 24 Hrs  T(C): 36.8 (07 Dec 2022 11:22), Max: 36.8 (07 Dec 2022 05:21)  T(F): 98.3 (07 Dec 2022 11:22), Max: 98.3 (07 Dec 2022 11:22)  HR: 89 (07 Dec 2022 11:22) (79 - 89)  BP: 119/72 (07 Dec 2022 11:22) (116/74 - 146/70)  BP(mean): --  ABP: --  ABP(mean): --  RR: 19 (07 Dec 2022 11:22) (18 - 20)  SpO2: 96% (07 Dec 2022 11:22) (93% - 96%)    O2 Parameters below as of 07 Dec 2022 11:22  Patient On (Oxygen Delivery Method): room air        ROS:  Negative except for:    PHYSICAL EXAM  General: adult in NAD  HEENT: clear oropharynx, anicteric sclera, pink conjunctiva  Neck: supple  CV: normal S1/S2 with no murmur rubs or gallops  Lungs: positive air movement b/l ant lungs,clear to auscultation, no wheezes, no rales, CT in place  Abdomen: soft non-tender non-distended, no hepatosplenomegaly  Ext: no clubbing cyanosis or edema  Skin: no rashes and no petechiae  Neuro: alert and oriented X 4, no focal deficits      LABS:    CBC Full  -  ( 07 Dec 2022 10:07 )  WBC Count : 9.44 K/uL  RBC Count : 3.69 M/uL  Hemoglobin : 10.4 g/dL  Hematocrit : 34.3 %  Platelet Count - Automated : 1106 K/uL  Mean Cell Volume : 93.0 fl  Mean Cell Hemoglobin : 28.2 pg  Mean Cell Hemoglobin Concentration : 30.3 gm/dL  Auto Neutrophil # : 5.19 K/uL  Auto Lymphocyte # : 2.71 K/uL  Auto Monocyte # : 1.07 K/uL  Auto Eosinophil # : 0.24 K/uL  Auto Basophil # : 0.06 K/uL  Auto Neutrophil % : 55.1 %  Auto Lymphocyte % : 28.7 %  Auto Monocyte % : 11.3 %  Auto Eosinophil % : 2.5 %  Auto Basophil % : 0.6 %        Serial CBC's  12-06 @ 08:14  Hct-32.2 / Hgb-10.1 / Plat-1029 / RBC-3.56 / WBC-10.12  Serial CBC's  12-05 @ 07:10  Hct-31.2 / Hgb-9.6 / Plat-925 / RBC-3.35 / WBC-10.62  Serial CBC's  12-04 @ 05:30  Hct-31.3 / Hgb-10.0 / Plat-799 / RBC-3.45 / WBC-15.48  Serial CBC's  12-03 @ 06:27  Hct-34.4 / Hgb-10.7 / Plat-881 / RBC-3.74 / WBC-18.09    12-07    142  |  103  |  5<L>  ----------------------------<  136<H>  4.5   |  31  |  0.64    Ca    9.2      07 Dec 2022 10:07  Phos  3.2     12-07  Mg     2.0     12-07        LIVER FUNCTIONS - ( 05 Dec 2022 07:10 )  Alb: 2.3 g/dL / Pro: 5.9 g/dL / ALK PHOS: 87 U/L / ALT: 47 U/L / AST: 32 U/L / GGT: x           BLOOD SMEAR INTERPRETATION:       RADIOLOGY & ADDITIONAL STUDIES:    < from: CT Abdomen and Pelvis w/ IV Cont (12.05.22 @ 23:06) >  IMPRESSION:  Endometrial tumor, polyp or submucosal fibroid. Recommend pelvic   ultrasound correlation.  Unchanged left basilar pneumonia. Small residual pleural fluid including   anterior and fissural loculated components, with resolved basilar   pneumothorax.  Enlarged liver.    < end of copied text >      < from: CT Chest No Cont (12.01.22 @ 10:28) >  IMPRESSION:    1. Large left basilar consolidation. Small amount of loculated fluid in   the left major fissure.  2. Small left pneumothorax with a smallamount of debris and/or pleural   fluid in the posterior left lung base. Status post left pigtail chest   tube placement.  3. Mediastinal lymphadenopathy, most likely reactive.  4. Small apical pericardial effusion.    < end of copied text >

## 2022-12-07 NOTE — PROGRESS NOTE ADULT - TIME BILLING
Patient seen and examined with house-staff during bedside rounds.  Resident note read, including vitals, physical findings, laboratory data, and radiological reports.   Revisions included below.  Direct personal management at bed side and extensive interpretation of the data.  Plan was outlined and discussed in details with the housestaff.  Decision making of high complexity  Action taken for acute disease activity to reflect the level of care provided:  - medication reconciliation  - review laboratory data  CT drarined less than 100cc  DCed CT tube  await pet and vaginal US  follow with heme  discussed with

## 2022-12-07 NOTE — PROGRESS NOTE ADULT - PROBLEM SELECTOR PLAN 8
Plt ct elevated and slowly uptrending since admission. Plt 1029 12/6    - Heme/onc consulted, f/u recs Plt ct elevated and slowly uptrending since admission. Plt 1029 12/6 Plt 1100 12/7    - Heme/onc consulted, f/u recs

## 2022-12-08 ENCOUNTER — TRANSCRIPTION ENCOUNTER (OUTPATIENT)
Age: 62
End: 2022-12-08

## 2022-12-08 LAB
CULTURE RESULTS: SIGNIFICANT CHANGE UP
GLUCOSE BLDC GLUCOMTR-MCNC: 85 MG/DL — SIGNIFICANT CHANGE UP (ref 70–99)
SPECIMEN SOURCE: SIGNIFICANT CHANGE UP

## 2022-12-08 PROCEDURE — 76830 TRANSVAGINAL US NON-OB: CPT | Mod: 26

## 2022-12-08 PROCEDURE — 99233 SBSQ HOSP IP/OBS HIGH 50: CPT | Mod: GC

## 2022-12-08 PROCEDURE — 78815 PET IMAGE W/CT SKULL-THIGH: CPT | Mod: 26,PI

## 2022-12-08 PROCEDURE — 76856 US EXAM PELVIC COMPLETE: CPT | Mod: 26

## 2022-12-08 RX ORDER — AMPICILLIN SODIUM AND SULBACTAM SODIUM 250; 125 MG/ML; MG/ML
3 INJECTION, POWDER, FOR SUSPENSION INTRAMUSCULAR; INTRAVENOUS EVERY 6 HOURS
Refills: 0 | Status: DISCONTINUED | OUTPATIENT
Start: 2022-12-08 | End: 2022-12-09

## 2022-12-08 RX ADMIN — AMPICILLIN SODIUM AND SULBACTAM SODIUM 200 GRAM(S): 250; 125 INJECTION, POWDER, FOR SUSPENSION INTRAMUSCULAR; INTRAVENOUS at 19:11

## 2022-12-08 RX ADMIN — ENOXAPARIN SODIUM 40 MILLIGRAM(S): 100 INJECTION SUBCUTANEOUS at 13:45

## 2022-12-08 RX ADMIN — BUDESONIDE AND FORMOTEROL FUMARATE DIHYDRATE 2 PUFF(S): 160; 4.5 AEROSOL RESPIRATORY (INHALATION) at 08:17

## 2022-12-08 RX ADMIN — AMPICILLIN SODIUM AND SULBACTAM SODIUM 200 GRAM(S): 250; 125 INJECTION, POWDER, FOR SUSPENSION INTRAMUSCULAR; INTRAVENOUS at 13:52

## 2022-12-08 RX ADMIN — AMPICILLIN SODIUM AND SULBACTAM SODIUM 200 GRAM(S): 250; 125 INJECTION, POWDER, FOR SUSPENSION INTRAMUSCULAR; INTRAVENOUS at 04:12

## 2022-12-08 RX ADMIN — BUDESONIDE AND FORMOTEROL FUMARATE DIHYDRATE 2 PUFF(S): 160; 4.5 AEROSOL RESPIRATORY (INHALATION) at 17:30

## 2022-12-08 NOTE — PROGRESS NOTE ADULT - PROBLEM SELECTOR PLAN 3
Pt with low-grade temperatures (Tmax 99F), acute on chronic cough, CP, dyspnea, and weakness x1 week. CXR with L pleural effusion. CT chest/abdomen at outside hospital on 11/23 with L pleural effusion and probable partially cystic mass at L lung base. Was diagnosed with empyema at outside hospital and sent home with Augmentin and azithromycin.   - Chest tube removed 12/7, post-removal CXR does not demonstrate pneumothorax  - c/w Unasyn 3g Q6, last day of abx 12/8  - ID consulted, f/u recs

## 2022-12-08 NOTE — PROGRESS NOTE ADULT - TIME BILLING
Patient seen and examined with house-staff during bedside rounds.  Resident note read, including vitals, physical findings, laboratory data, and radiological reports.   Revisions included below.  Direct personal management at bed side and extensive interpretation of the data.  Plan was outlined and discussed in details with the housestaff.  Decision making of high complexity  Action taken for acute disease activity to reflect the level of care provided:  - medication reconciliation  - review laboratory data  I had multiple discussion with the family and in nuclear medicine.  I reviewed the case with nuclear medicine prior to the PET scan.  And secondly I reviewed after the PET scan.  The area in the in the lingula decreased in size and the only uptake is the pleural reaction which is corresponding to complicated pleural effusion which she had.  The vaginal ultrasound revealed possible fibroids but cannot rule out underlying malignancy.  As the patient stated that she had work-up for that previously and was negative.  Unofficially the area in the breast and uterus were negative on the PET scan.  Platelet count is still elevated and await recommendation by hematology.  Tomorrow is the last day of antibiotic.  Will follow with breast surgeon within the patient need to be seen outpatient or not.  Possible discharge tomorrow.  Discussed the case after all the results with the hospitalist.

## 2022-12-08 NOTE — PROGRESS NOTE ADULT - SUBJECTIVE AND OBJECTIVE BOX
OVERNIGHT EVENTS: Pt comfortable o/n, no SOB. (S/p chest tube removal yesterday)    SUBJECTIVE:  Patient seen and examined at bedside.  ROS: Patient denies h/n/v/d, fever, chills, cp, palpitations, sob, abd pain, leg swelling, rashes, dysuria, and changes in BM.     Vital Signs Last 12 Hrs  T(F): 98.9 (12-08-22 @ 05:57), Max: 98.9 (12-08-22 @ 05:57)  HR: 76 (12-08-22 @ 05:57) (76 - 76)  BP: 107/66 (12-08-22 @ 05:57) (107/66 - 107/66)  BP(mean): --  RR: 16 (12-08-22 @ 05:57) (16 - 16)  SpO2: 94% (12-08-22 @ 05:57) (94% - 94%)  I&O's Summary      PHYSICAL EXAM:  Constitutional: NAD, comfortable in bed.  HEENT: NC/AT, PERRLA, EOMI, no conjunctival pallor or scleral icterus, MMM  Neck: Supple, no JVD  Respiratory: CTA B/L. No w/r/r.   Cardiovascular: RRR, normal S1 and S2, no m/r/g.   Gastrointestinal: +BS, soft NTND, no guarding or rebound tenderness, no palpable masses   Extremities: wwp; no cyanosis, clubbing or edema.   Vascular: Pulses equal and strong throughout.   Neurological: AAOx3, no CN deficits, strength and sensation intact throughout.   Skin: No gross skin abnormalities or rashes        LABS:                        10.4   9.44  )-----------( 1106     ( 07 Dec 2022 10:07 )             34.3     12-07    142  |  103  |  5<L>  ----------------------------<  136<H>  4.5   |  31  |  0.64    Ca    9.2      07 Dec 2022 10:07  Phos  3.2     12-07  Mg     2.0     12-07              RADIOLOGY & ADDITIONAL TESTS:    MEDICATIONS  (STANDING):  ampicillin/sulbactam  IVPB 3 Gram(s) IV Intermittent every 6 hours  budesonide 160 MICROgram(s)/formoterol 4.5 MICROgram(s) Inhaler 2 Puff(s) Inhalation two times a day  enoxaparin Injectable 40 milliGRAM(s) SubCutaneous every 24 hours    MEDICATIONS  (PRN):  acetaminophen     Tablet .. 650 milliGRAM(s) Oral every 6 hours PRN Temp greater or equal to 38C (100.4F), Mild Pain (1 - 3)  guaiFENesin Oral Liquid (Sugar-Free) 100 milliGRAM(s) Oral every 8 hours PRN Cough  melatonin 3 milliGRAM(s) Oral at bedtime PRN Insomnia

## 2022-12-08 NOTE — PROGRESS NOTE ADULT - PROBLEM SELECTOR PLAN 8
Plt ct elevated and slowly uptrending since admission. Plt 1029 12/6 Plt 1100 12/7    - Heme/onc consulted, f/u recs

## 2022-12-09 ENCOUNTER — TRANSCRIPTION ENCOUNTER (OUTPATIENT)
Age: 62
End: 2022-12-09

## 2022-12-09 VITALS
DIASTOLIC BLOOD PRESSURE: 74 MMHG | RESPIRATION RATE: 18 BRPM | TEMPERATURE: 98 F | HEART RATE: 76 BPM | OXYGEN SATURATION: 96 % | SYSTOLIC BLOOD PRESSURE: 118 MMHG

## 2022-12-09 LAB
ANION GAP SERPL CALC-SCNC: 8 MMOL/L — SIGNIFICANT CHANGE UP (ref 5–17)
APTT BLD: 33.7 SEC — SIGNIFICANT CHANGE UP (ref 27.5–35.5)
BASOPHILS # BLD AUTO: 0 K/UL — SIGNIFICANT CHANGE UP (ref 0–0.2)
BASOPHILS NFR BLD AUTO: 0 % — SIGNIFICANT CHANGE UP (ref 0–2)
BCR/ABL BY RT - PCR QUANTITATIVE: SIGNIFICANT CHANGE UP
BUN SERPL-MCNC: 6 MG/DL — LOW (ref 7–23)
BURR CELLS BLD QL SMEAR: PRESENT — SIGNIFICANT CHANGE UP
CALCIUM SERPL-MCNC: 9.2 MG/DL — SIGNIFICANT CHANGE UP (ref 8.4–10.5)
CHLORIDE SERPL-SCNC: 104 MMOL/L — SIGNIFICANT CHANGE UP (ref 96–108)
CO2 SERPL-SCNC: 26 MMOL/L — SIGNIFICANT CHANGE UP (ref 22–31)
CREAT SERPL-MCNC: 0.63 MG/DL — SIGNIFICANT CHANGE UP (ref 0.5–1.3)
DACRYOCYTES BLD QL SMEAR: SLIGHT — SIGNIFICANT CHANGE UP
EGFR: 101 ML/MIN/1.73M2 — SIGNIFICANT CHANGE UP
EOSINOPHIL # BLD AUTO: 0.14 K/UL — SIGNIFICANT CHANGE UP (ref 0–0.5)
EOSINOPHIL NFR BLD AUTO: 1.8 % — SIGNIFICANT CHANGE UP (ref 0–6)
GIANT PLATELETS BLD QL SMEAR: PRESENT — SIGNIFICANT CHANGE UP
GLUCOSE SERPL-MCNC: 96 MG/DL — SIGNIFICANT CHANGE UP (ref 70–99)
HCT VFR BLD CALC: 34.8 % — SIGNIFICANT CHANGE UP (ref 34.5–45)
HGB BLD-MCNC: 10.7 G/DL — LOW (ref 11.5–15.5)
INR BLD: 1.07 — SIGNIFICANT CHANGE UP (ref 0.88–1.16)
LYMPHOCYTES # BLD AUTO: 3.24 K/UL — SIGNIFICANT CHANGE UP (ref 1–3.3)
LYMPHOCYTES # BLD AUTO: 43.1 % — SIGNIFICANT CHANGE UP (ref 13–44)
MAGNESIUM SERPL-MCNC: 2.2 MG/DL — SIGNIFICANT CHANGE UP (ref 1.6–2.6)
MANUAL SMEAR VERIFICATION: SIGNIFICANT CHANGE UP
MCHC RBC-ENTMCNC: 28.5 PG — SIGNIFICANT CHANGE UP (ref 27–34)
MCHC RBC-ENTMCNC: 30.7 GM/DL — LOW (ref 32–36)
MCV RBC AUTO: 92.8 FL — SIGNIFICANT CHANGE UP (ref 80–100)
MONOCYTES # BLD AUTO: 0.56 K/UL — SIGNIFICANT CHANGE UP (ref 0–0.9)
MONOCYTES NFR BLD AUTO: 7.4 % — SIGNIFICANT CHANGE UP (ref 2–14)
MYELOCYTES NFR BLD: 1.8 % — HIGH (ref 0–0)
NEUTROPHILS # BLD AUTO: 3.45 K/UL — SIGNIFICANT CHANGE UP (ref 1.8–7.4)
NEUTROPHILS NFR BLD AUTO: 45.9 % — SIGNIFICANT CHANGE UP (ref 43–77)
NRBC # BLD: 3 /100 — HIGH (ref 0–0)
NRBC # BLD: SIGNIFICANT CHANGE UP /100 WBCS (ref 0–0)
OVALOCYTES BLD QL SMEAR: SLIGHT — SIGNIFICANT CHANGE UP
PHOSPHATE SERPL-MCNC: 2.9 MG/DL — SIGNIFICANT CHANGE UP (ref 2.5–4.5)
PLAT MORPH BLD: NORMAL — SIGNIFICANT CHANGE UP
PLATELET # BLD AUTO: 1155 K/UL — CRITICAL HIGH (ref 150–400)
POIKILOCYTOSIS BLD QL AUTO: SIGNIFICANT CHANGE UP
POLYCHROMASIA BLD QL SMEAR: SLIGHT — SIGNIFICANT CHANGE UP
POTASSIUM SERPL-MCNC: 4.2 MMOL/L — SIGNIFICANT CHANGE UP (ref 3.5–5.3)
POTASSIUM SERPL-SCNC: 4.2 MMOL/L — SIGNIFICANT CHANGE UP (ref 3.5–5.3)
PROTHROM AB SERPL-ACNC: 12.8 SEC — SIGNIFICANT CHANGE UP (ref 10.5–13.4)
RBC # BLD: 3.75 M/UL — LOW (ref 3.8–5.2)
RBC # FLD: 13.3 % — SIGNIFICANT CHANGE UP (ref 10.3–14.5)
RBC BLD AUTO: ABNORMAL
SMUDGE CELLS # BLD: PRESENT — SIGNIFICANT CHANGE UP
SODIUM SERPL-SCNC: 138 MMOL/L — SIGNIFICANT CHANGE UP (ref 135–145)
WBC # BLD: 7.52 K/UL — SIGNIFICANT CHANGE UP (ref 3.8–10.5)
WBC # FLD AUTO: 7.52 K/UL — SIGNIFICANT CHANGE UP (ref 3.8–10.5)

## 2022-12-09 PROCEDURE — 87449 NOS EACH ORGANISM AG IA: CPT

## 2022-12-09 PROCEDURE — 82746 ASSAY OF FOLIC ACID SERUM: CPT

## 2022-12-09 PROCEDURE — 76856 US EXAM PELVIC COMPLETE: CPT

## 2022-12-09 PROCEDURE — 87070 CULTURE OTHR SPECIMN AEROBIC: CPT

## 2022-12-09 PROCEDURE — 87015 SPECIMEN INFECT AGNT CONCNTJ: CPT

## 2022-12-09 PROCEDURE — 89051 BODY FLUID CELL COUNT: CPT

## 2022-12-09 PROCEDURE — 78815 PET IMAGE W/CT SKULL-THIGH: CPT

## 2022-12-09 PROCEDURE — 81338 MPL GENE COMMON VARIANTS: CPT

## 2022-12-09 PROCEDURE — 81219 CALR GENE COM VARIANTS: CPT

## 2022-12-09 PROCEDURE — 76830 TRANSVAGINAL US NON-OB: CPT

## 2022-12-09 PROCEDURE — 87116 MYCOBACTERIA CULTURE: CPT

## 2022-12-09 PROCEDURE — 87635 SARS-COV-2 COVID-19 AMP PRB: CPT

## 2022-12-09 PROCEDURE — 83735 ASSAY OF MAGNESIUM: CPT

## 2022-12-09 PROCEDURE — 93005 ELECTROCARDIOGRAM TRACING: CPT

## 2022-12-09 PROCEDURE — 84311 SPECTROPHOTOMETRY: CPT

## 2022-12-09 PROCEDURE — 0027U JAK2 GENE TRGT SEQ ALYS: CPT

## 2022-12-09 PROCEDURE — 99239 HOSP IP/OBS DSCHRG MGMT >30: CPT | Mod: GC

## 2022-12-09 PROCEDURE — 87102 FUNGUS ISOLATION CULTURE: CPT

## 2022-12-09 PROCEDURE — 86850 RBC ANTIBODY SCREEN: CPT

## 2022-12-09 PROCEDURE — 82962 GLUCOSE BLOOD TEST: CPT

## 2022-12-09 PROCEDURE — 74230 X-RAY XM SWLNG FUNCJ C+: CPT

## 2022-12-09 PROCEDURE — 86140 C-REACTIVE PROTEIN: CPT

## 2022-12-09 PROCEDURE — 83986 ASSAY PH BODY FLUID NOS: CPT

## 2022-12-09 PROCEDURE — U0003: CPT

## 2022-12-09 PROCEDURE — 81207 BCR/ABL1 GENE MINOR BP: CPT

## 2022-12-09 PROCEDURE — 83550 IRON BINDING TEST: CPT

## 2022-12-09 PROCEDURE — 85730 THROMBOPLASTIN TIME PARTIAL: CPT

## 2022-12-09 PROCEDURE — 94640 AIRWAY INHALATION TREATMENT: CPT

## 2022-12-09 PROCEDURE — 85027 COMPLETE CBC AUTOMATED: CPT

## 2022-12-09 PROCEDURE — 83010 ASSAY OF HAPTOGLOBIN QUANT: CPT

## 2022-12-09 PROCEDURE — 86900 BLOOD TYPING SEROLOGIC ABO: CPT

## 2022-12-09 PROCEDURE — 92610 EVALUATE SWALLOWING FUNCTION: CPT

## 2022-12-09 PROCEDURE — 83540 ASSAY OF IRON: CPT

## 2022-12-09 PROCEDURE — 82042 OTHER SOURCE ALBUMIN QUAN EA: CPT

## 2022-12-09 PROCEDURE — 82945 GLUCOSE OTHER FLUID: CPT

## 2022-12-09 PROCEDURE — 88305 TISSUE EXAM BY PATHOLOGIST: CPT

## 2022-12-09 PROCEDURE — 88300 SURGICAL PATH GROSS: CPT

## 2022-12-09 PROCEDURE — 84145 PROCALCITONIN (PCT): CPT

## 2022-12-09 PROCEDURE — 85610 PROTHROMBIN TIME: CPT

## 2022-12-09 PROCEDURE — 87205 SMEAR GRAM STAIN: CPT

## 2022-12-09 PROCEDURE — U0005: CPT

## 2022-12-09 PROCEDURE — 92611 MOTION FLUOROSCOPY/SWALLOW: CPT

## 2022-12-09 PROCEDURE — 83615 LACTATE (LD) (LDH) ENZYME: CPT

## 2022-12-09 PROCEDURE — 85025 COMPLETE CBC W/AUTO DIFF WBC: CPT

## 2022-12-09 PROCEDURE — 84157 ASSAY OF PROTEIN OTHER: CPT

## 2022-12-09 PROCEDURE — 81206 BCR/ABL1 GENE MAJOR BP: CPT

## 2022-12-09 PROCEDURE — 84100 ASSAY OF PHOSPHORUS: CPT

## 2022-12-09 PROCEDURE — 87206 SMEAR FLUORESCENT/ACID STAI: CPT

## 2022-12-09 PROCEDURE — 36415 COLL VENOUS BLD VENIPUNCTURE: CPT

## 2022-12-09 PROCEDURE — 88312 SPECIAL STAINS GROUP 1: CPT

## 2022-12-09 PROCEDURE — 82728 ASSAY OF FERRITIN: CPT

## 2022-12-09 PROCEDURE — 81001 URINALYSIS AUTO W/SCOPE: CPT

## 2022-12-09 PROCEDURE — A9552: CPT

## 2022-12-09 PROCEDURE — 74177 CT ABD & PELVIS W/CONTRAST: CPT

## 2022-12-09 PROCEDURE — 82607 VITAMIN B-12: CPT

## 2022-12-09 PROCEDURE — 99285 EMERGENCY DEPT VISIT HI MDM: CPT

## 2022-12-09 PROCEDURE — 86901 BLOOD TYPING SEROLOGIC RH(D): CPT

## 2022-12-09 PROCEDURE — 85045 AUTOMATED RETICULOCYTE COUNT: CPT

## 2022-12-09 PROCEDURE — 80048 BASIC METABOLIC PNL TOTAL CA: CPT

## 2022-12-09 PROCEDURE — 87305 ASPERGILLUS AG IA: CPT

## 2022-12-09 PROCEDURE — 86803 HEPATITIS C AB TEST: CPT

## 2022-12-09 PROCEDURE — 87075 CULTR BACTERIA EXCEPT BLOOD: CPT

## 2022-12-09 PROCEDURE — 80053 COMPREHEN METABOLIC PANEL: CPT

## 2022-12-09 PROCEDURE — 71250 CT THORAX DX C-: CPT

## 2022-12-09 PROCEDURE — 87040 BLOOD CULTURE FOR BACTERIA: CPT

## 2022-12-09 PROCEDURE — 71045 X-RAY EXAM CHEST 1 VIEW: CPT

## 2022-12-09 PROCEDURE — 81270 JAK2 GENE: CPT

## 2022-12-09 PROCEDURE — 88112 CYTOPATH CELL ENHANCE TECH: CPT

## 2022-12-09 RX ADMIN — AMPICILLIN SODIUM AND SULBACTAM SODIUM 200 GRAM(S): 250; 125 INJECTION, POWDER, FOR SUSPENSION INTRAMUSCULAR; INTRAVENOUS at 00:45

## 2022-12-09 RX ADMIN — AMPICILLIN SODIUM AND SULBACTAM SODIUM 200 GRAM(S): 250; 125 INJECTION, POWDER, FOR SUSPENSION INTRAMUSCULAR; INTRAVENOUS at 06:49

## 2022-12-09 RX ADMIN — BUDESONIDE AND FORMOTEROL FUMARATE DIHYDRATE 2 PUFF(S): 160; 4.5 AEROSOL RESPIRATORY (INHALATION) at 06:47

## 2022-12-09 RX ADMIN — AMPICILLIN SODIUM AND SULBACTAM SODIUM 200 GRAM(S): 250; 125 INJECTION, POWDER, FOR SUSPENSION INTRAMUSCULAR; INTRAVENOUS at 12:49

## 2022-12-09 NOTE — PROGRESS NOTE ADULT - ASSESSMENT
Thrombocytosis, primary vs reactive  --Iron studies are WNL  --Check Jak2, MPL, CALR, BCR/ABL, flow   --Patient is asplenic, unclear why, she denies h/o splenectomy, spleen appears atrophic, this will contribute to thrombocytosis  --[facility] records were reviewed, mild thrombocytosis was noted in 2021 (mid 400K range)   --Patient denies h/o bleeding/thrombotic complications   --DVT ppx per protocol   --OK for home d/c, follow up with Sary 12/14/2022 12/15,  is aware of appointment  --May need to consider BMBx as outpatient   --Hold off on ASA 2/2 possibility of development of acquired vW 2/2 thrombocytosis     Uterine ?mass/polyp/intramural fibroid  --Pelvic US, may benefit from MRI pelvis, GYN eval for biopsy, can be deferred for outpatient work up   --Prior work up in [place] in 2018, asked family to bring records/images   --Patient is aware to follow up with her GYN, Dr. Radha Vaughn, outside records were reviewed     Breast abnormality noted on CT  --Neg mammo in 2016, no recent mammograms   --Mammo/breast surgery referral can be done as outpatient   --PET CT reviewed  --R retroareolar cyst removed, mammo BIRADS2 in 2016, PET reveals ? retroareolar scarring on the R, will need outpatient mammo/sono     Pleural effusion  --Cytology is neg for malignancy   --Consolidation, improving. attention to follow up       Will follow, discussed with housestaff and primary attending

## 2022-12-09 NOTE — PROGRESS NOTE ADULT - REASON FOR ADMISSION
PNA
PNA
productive cough and weakness
cough and weakness
productive cough and weakness
cough and weakness

## 2022-12-09 NOTE — PROGRESS NOTE ADULT - SUBJECTIVE AND OBJECTIVE BOX
Patient is a 62y old  Female who presents with a chief complaint of cough and weakness (04 Dec 2022 11:49)        HPI:  63 y/o F with PMHx of Crohn's, diverticulosis, asthma, and benign breast cyst (in 1989) presented with acute on chronic cough, chest pain, and weakness. Pt states she had a chronic intermittent cough with sputum production for 4 years; sputum varied in color but sometimes greenish. Reports increased cough frequency, increased sputum production, weakness, and decreased PO intake over the past week, with associated dyspnea and chest pain over the past 2 days. Also reports low-grade temperatures at home (Tmax 99F). Pt presented to her PCP 2 days ago, who sent her to the hospital. At the hospital, she had a CT chest and abdomen that demonstrated moderate L-sided pleural effusion, probably partially cystic mass at L lung base anteriorly, ~4.7cm, retroareolar R breast mass, moderately sized hiatal hernia, hepatomegaly, absent spleen, and diverticulosis. She was diagnosed with empyema and discharged home with Augmentin and azithromycin, but she  continued feeling weak so she presented to the ED today. Reports improvement in dyspnea while on 2L NC. Dyspnea is worse when lying down. No HA, dizziness, abd pain.     ED Course:   Vitals on Presentation: T99.8F | /89 | HR 95 | RR 22 | O2sat 93% on RA --> 98% on 2L NC  Labs notable for WBC 19.56, platelets 491  CXR: +large L pleural effusion   Interventions: Tylenol 650mg x1, Zosyn 3.375 x1 (25 Nov 2022 17:30)     12/9/2022: Patient feeling well, CT has been removed.   PET CT results were discussed, lower suspicion for malignancy based on results, however patient is aware she will need to get GYN follow up, mammo/sono, and close follow up with pulm re resolving/improving lung infiltrate.   All myeloproliferative work up is still pending, OK for D/C, patient is aware that she may require BMBx as outpatient.       PAST MEDICAL & SURGICAL HISTORY:  Prediabetes      Diverticulosis      Crohn&#x27;s disease      History of cholecystectomy      S/P parathyroidectomy      Benign cyst of breast            FAMILY HISTORY:  FH: lung cancer (Father)    Family history of CLL (chronic lymphoid leukemia) (Mother)        Social History:    Allergies    tetracyclines (Swelling)    Intolerances    MEDICATIONS  (STANDING):  ampicillin/sulbactam  IVPB 3 Gram(s) IV Intermittent every 6 hours  budesonide 160 MICROgram(s)/formoterol 4.5 MICROgram(s) Inhaler 2 Puff(s) Inhalation two times a day  enoxaparin Injectable 40 milliGRAM(s) SubCutaneous every 24 hours    MEDICATIONS  (PRN):  acetaminophen     Tablet .. 650 milliGRAM(s) Oral every 6 hours PRN Temp greater or equal to 38C (100.4F), Mild Pain (1 - 3)  guaiFENesin Oral Liquid (Sugar-Free) 100 milliGRAM(s) Oral every 8 hours PRN Cough  melatonin 3 milliGRAM(s) Oral at bedtime PRN Insomnia    ICU Vital Signs Last 24 Hrs  T(C): 36.8 (09 Dec 2022 06:03), Max: 36.8 (09 Dec 2022 06:03)  T(F): 98.2 (09 Dec 2022 06:03), Max: 98.2 (09 Dec 2022 06:03)  HR: 78 (09 Dec 2022 06:03) (78 - 82)  BP: 108/65 (09 Dec 2022 06:03) (108/65 - 134/80)  BP(mean): --  ABP: --  ABP(mean): --  RR: 18 (09 Dec 2022 06:03) (18 - 18)  SpO2: 95% (09 Dec 2022 06:03) (95% - 96%)    O2 Parameters below as of 08 Dec 2022 20:52  Patient On (Oxygen Delivery Method): room air                ROS:  Negative except for: as per HPI     PHYSICAL EXAM  General: adult in NAD  HEENT: clear oropharynx, anicteric sclera, pink conjunctiva  Neck: supple  CV: normal S1/S2 with no murmur rubs or gallops  Lungs: positive air movement b/l ant lungs,clear to auscultation, no wheezes, no rales, CT in place  Abdomen: soft non-tender non-distended, no hepatosplenomegaly  Ext: no clubbing cyanosis or edema  Skin: no rashes and no petechiae  Neuro: alert and oriented X 4, no focal deficits      LABS:                          10.7   7.52  )-----------( 1155     ( 09 Dec 2022 05:30 )             34.8     12-09    138  |  104  |  6<L>  ----------------------------<  96  4.2   |  26  |  0.63    Ca    9.2      09 Dec 2022 05:30  Phos  2.9     12-09  Mg     2.2     12-09          LIVER FUNCTIONS - ( 05 Dec 2022 07:10 )  Alb: 2.3 g/dL / Pro: 5.9 g/dL / ALK PHOS: 87 U/L / ALT: 47 U/L / AST: 32 U/L / GGT: x           BLOOD SMEAR INTERPRETATION:       RADIOLOGY & ADDITIONAL STUDIES:    < from: CT Abdomen and Pelvis w/ IV Cont (12.05.22 @ 23:06) >  IMPRESSION:  Endometrial tumor, polyp or submucosal fibroid. Recommend pelvic   ultrasound correlation.  Unchanged left basilar pneumonia. Small residual pleural fluid including   anterior and fissural loculated components, with resolved basilar   pneumothorax.  Enlarged liver.    < end of copied text >      < from: CT Chest No Cont (12.01.22 @ 10:28) >  IMPRESSION:    1. Large left basilar consolidation. Small amount of loculated fluid in   the left major fissure.  2. Small left pneumothorax with a smallamount of debris and/or pleural   fluid in the posterior left lung base. Status post left pigtail chest   tube placement.  3. Mediastinal lymphadenopathy, most likely reactive.  4. Small apical pericardial effusion.    < end of copied text >

## 2022-12-09 NOTE — DISCHARGE NOTE NURSING/CASE MANAGEMENT/SOCIAL WORK - PATIENT PORTAL LINK FT
You can access the FollowMyHealth Patient Portal offered by [facility] by registering at the following website: [email or web address]. By joining [employer]’s FollowMyHealth portal, you will also be able to view your health information using other applications (apps) compatible with our system.

## 2022-12-09 NOTE — DISCHARGE NOTE NURSING/CASE MANAGEMENT/SOCIAL WORK - NSDCFUADDAPPT_GEN_ALL_CORE_FT
Please go to your follow up appointment with your gastroenterology doctor (Dr. Ivy) on 3pm on 1/3/2023.     Please schedule a follow up appointment with your hematology/oncology/blood doctor (Dr. Okeefe) within 2 weeks of leaving the hospital. Please call her office at [phone] to schedule the appointment.     Please schedule a follow up appointment with Dr. Glez, our chief of breast surgery, within 2 weeks of leaving the hospital. Please call [phone] to make the appointment.     Please schedule a follow up appointment with a women's health doctor within 2 weeks of leaving the hospital. Please call [phone] to schedule the appointment if you would like to see one of our women's health doctors.

## 2022-12-09 NOTE — PROGRESS NOTE ADULT - TIME BILLING
Patient seen and examined with house-staff during bedside rounds.  Resident note read, including vitals, physical findings, laboratory data, and radiological reports.   Revisions included below.  Direct personal management at bed side and extensive interpretation of the data.  Plan was outlined and discussed in details with the housestaff.  Decision making of high complexity  Action taken for acute disease activity to reflect the level of care provided:  - medication reconciliation  - review laboratory data  Stable.  She is aware of the negative PET scan.  She will follow-up with breast surgery, hematology, GYN, and will follow-up in the office.  CT scan in 6 weeks.

## 2022-12-12 LAB — CALRETICULIN INTERPRETATION: SIGNIFICANT CHANGE UP

## 2022-12-13 LAB — HEMATOPATHOLOGY REPORT: SIGNIFICANT CHANGE UP

## 2022-12-14 ENCOUNTER — APPOINTMENT (OUTPATIENT)
Dept: PULMONOLOGY | Facility: CLINIC | Age: 62
End: 2022-12-14

## 2022-12-14 DIAGNOSIS — D75.839 THROMBOCYTOSIS, UNSPECIFIED: ICD-10-CM

## 2022-12-14 DIAGNOSIS — Z80.1 FAMILY HISTORY OF MALIGNANT NEOPLASM OF TRACHEA, BRONCHUS AND LUNG: ICD-10-CM

## 2022-12-14 LAB — MPL EXON 10 MUTATION: SIGNIFICANT CHANGE UP

## 2022-12-14 PROCEDURE — 99214 OFFICE O/P EST MOD 30 MIN: CPT

## 2022-12-14 RX ORDER — BUDESONIDE AND FORMOTEROL FUMARATE DIHYDRATE 160; 4.5 UG/1; UG/1
160-4.5 AEROSOL RESPIRATORY (INHALATION)
Refills: 0 | Status: ACTIVE | COMMUNITY
Start: 2022-12-14

## 2022-12-14 NOTE — ASSESSMENT
[FreeTextEntry1] : Patient was admitted with left upper lobe pneumonia and complicated left pleural effusion.  The patient had a pleural chest tube inserted and the fluid analysis was consistent with exudative effusion.  Cultures from the pleural fluid and cytology were negative.  Patient underwent bronchoscopy and was no evidence of endobronchial lesion.  Bronchial wash cytology and culture all negative.  Patient was on IV antibiotic and was discharged on oral antibiotic.  Patient is on last day of Augmentin.\par \par The patient is slowly improving with increase in her exercise capacity.  Baseline oxygen saturation is stable.\par \par Advised the patient the contact me if if there is any change in the clinical status after the last dose of the antibiotic.  Would have follow-up with CT scan of the chest to confirm the resolution of the left upper lobe abnormality.  The previous PET scan prior to discharge revealed improvement in the abnormality already and was negative on the PET scan.  The patient is scheduled to see a breast surgeon for the left breast abnormality, GYN for her uterine fibroids\par Plt count imprpoved

## 2022-12-14 NOTE — HISTORY OF PRESENT ILLNESS
[Never] : never [TextBox_4] : She was discharged from the hospital.  She is walking but she is still short of breath when she exerts herself but she is getting better.  Today is the last day of the antibiotic.  She is having problems sleeping at night.  No fever occasional cough no chills.  Good appetite.  She has a scheduled appointment with the hematologist the breast surgeon at [place] and the GYN. [ESS] : 0

## 2022-12-15 LAB
EXTRACTION: SIGNIFICANT CHANGE UP
JAK2 EXON 13 MUT ANL BLD/T: SIGNIFICANT CHANGE UP
JAK2 EXONS 12-15 PCR: SIGNIFICANT CHANGE UP
Lab: SIGNIFICANT CHANGE UP
METHOD:: SIGNIFICANT CHANGE UP
REFERENCES: SIGNIFICANT CHANGE UP
REFLEX:: SIGNIFICANT CHANGE UP

## 2022-12-16 DIAGNOSIS — J18.9 PNEUMONIA, UNSPECIFIED ORGANISM: ICD-10-CM

## 2022-12-16 DIAGNOSIS — K50.90 CROHN'S DISEASE, UNSPECIFIED, WITHOUT COMPLICATIONS: ICD-10-CM

## 2022-12-16 DIAGNOSIS — K59.00 CONSTIPATION, UNSPECIFIED: ICD-10-CM

## 2022-12-16 DIAGNOSIS — J93.9 PNEUMOTHORAX, UNSPECIFIED: ICD-10-CM

## 2022-12-16 DIAGNOSIS — N63.10 UNSPECIFIED LUMP IN THE RIGHT BREAST, UNSPECIFIED QUADRANT: ICD-10-CM

## 2022-12-16 DIAGNOSIS — J90 PLEURAL EFFUSION, NOT ELSEWHERE CLASSIFIED: ICD-10-CM

## 2022-12-16 DIAGNOSIS — N85.8 OTHER SPECIFIED NONINFLAMMATORY DISORDERS OF UTERUS: ICD-10-CM

## 2022-12-16 DIAGNOSIS — K57.30 DIVERTICULOSIS OF LARGE INTESTINE WITHOUT PERFORATION OR ABSCESS WITHOUT BLEEDING: ICD-10-CM

## 2022-12-16 DIAGNOSIS — Z77.22 CONTACT WITH AND (SUSPECTED) EXPOSURE TO ENVIRONMENTAL TOBACCO SMOKE (ACUTE) (CHRONIC): ICD-10-CM

## 2022-12-16 DIAGNOSIS — I31.39 OTHER PERICARDIAL EFFUSION (NONINFLAMMATORY): ICD-10-CM

## 2022-12-16 DIAGNOSIS — D75.839 THROMBOCYTOSIS, UNSPECIFIED: ICD-10-CM

## 2022-12-16 DIAGNOSIS — Z88.0 ALLERGY STATUS TO PENICILLIN: ICD-10-CM

## 2022-12-16 DIAGNOSIS — R73.03 PREDIABETES: ICD-10-CM

## 2022-12-16 DIAGNOSIS — K44.9 DIAPHRAGMATIC HERNIA WITHOUT OBSTRUCTION OR GANGRENE: ICD-10-CM

## 2022-12-16 DIAGNOSIS — A41.9 SEPSIS, UNSPECIFIED ORGANISM: ICD-10-CM

## 2022-12-16 DIAGNOSIS — J86.9 PYOTHORAX WITHOUT FISTULA: ICD-10-CM

## 2022-12-16 DIAGNOSIS — R59.0 LOCALIZED ENLARGED LYMPH NODES: ICD-10-CM

## 2022-12-16 DIAGNOSIS — J45.909 UNSPECIFIED ASTHMA, UNCOMPLICATED: ICD-10-CM

## 2022-12-28 LAB
CULTURE RESULTS: SIGNIFICANT CHANGE UP
CULTURE RESULTS: SIGNIFICANT CHANGE UP
SPECIMEN SOURCE: SIGNIFICANT CHANGE UP
SPECIMEN SOURCE: SIGNIFICANT CHANGE UP

## 2023-01-03 ENCOUNTER — APPOINTMENT (OUTPATIENT)
Dept: GASTROENTEROLOGY | Facility: CLINIC | Age: 63
End: 2023-01-03
Payer: COMMERCIAL

## 2023-01-03 VITALS
DIASTOLIC BLOOD PRESSURE: 75 MMHG | WEIGHT: 176 LBS | RESPIRATION RATE: 15 BRPM | SYSTOLIC BLOOD PRESSURE: 130 MMHG | OXYGEN SATURATION: 97 % | HEART RATE: 81 BPM | TEMPERATURE: 98.5 F

## 2023-01-03 DIAGNOSIS — K22.5 DIVERTICULUM OF ESOPHAGUS, ACQUIRED: ICD-10-CM

## 2023-01-03 PROCEDURE — 99203 OFFICE O/P NEW LOW 30 MIN: CPT

## 2023-01-11 ENCOUNTER — APPOINTMENT (OUTPATIENT)
Dept: PULMONOLOGY | Facility: CLINIC | Age: 63
End: 2023-01-11
Payer: COMMERCIAL

## 2023-01-11 VITALS
OXYGEN SATURATION: 98 % | WEIGHT: 175 LBS | HEIGHT: 69 IN | SYSTOLIC BLOOD PRESSURE: 149 MMHG | BODY MASS INDEX: 25.92 KG/M2 | DIASTOLIC BLOOD PRESSURE: 88 MMHG | HEART RATE: 77 BPM

## 2023-01-11 DIAGNOSIS — J90 PLEURAL EFFUSION, NOT ELSEWHERE CLASSIFIED: ICD-10-CM

## 2023-01-11 DIAGNOSIS — Z01.811 ENCOUNTER FOR PREPROCEDURAL RESPIRATORY EXAMINATION: ICD-10-CM

## 2023-01-11 DIAGNOSIS — R06.02 SHORTNESS OF BREATH: ICD-10-CM

## 2023-01-11 LAB
CULTURE RESULTS: SIGNIFICANT CHANGE UP
SPECIMEN SOURCE: SIGNIFICANT CHANGE UP

## 2023-01-11 PROCEDURE — 99214 OFFICE O/P EST MOD 30 MIN: CPT

## 2023-01-11 NOTE — HISTORY OF PRESENT ILLNESS
[Never] : never [TextBox_4] : 62 year old returning for follow up at 4 weeks post completion of antibiotics for empyema. Patient states that she is doing better and was found to have Zenker's diverticulum with plan to have this closed. She is trying to eat liquid diet but has been eating whole food. Cough has improved the last few days with no SOB and able to walk multiple blocks without stopping. She denies any other symptoms [ESS] : 0

## 2023-01-11 NOTE — END OF VISIT
[] : Fellow [FreeTextEntry3] : CT scan of chest and PFT.  Cleared for the procedure [Time Spent: ___ minutes] : I have spent [unfilled] minutes of time on the encounter.

## 2023-01-11 NOTE — PHYSICAL EXAM
[No Acute Distress] : no acute distress [Normal Oropharynx] : normal oropharynx [Normal Appearance] : normal appearance [No Neck Mass] : no neck mass [Normal Rate/Rhythm] : normal rate/rhythm [Normal S1, S2] : normal s1, s2 [No Murmurs] : no murmurs [No Resp Distress] : no resp distress [Clear to Auscultation Bilaterally] : clear to auscultation bilaterally [Normal Gait] : normal gait [No Clubbing] : no clubbing [No Edema] : no edema [Normal Color/ Pigmentation] : normal color/ pigmentation [No Focal Deficits] : no focal deficits

## 2023-01-11 NOTE — ASSESSMENT
[FreeTextEntry1] : Reviewed:\par \par \par PT/CT 12/2022- uptake in LLL at site of effusion and consolidation\par \par CXR 12/022- Post chest tube removal with improvement in LLL consolidation and effusion\par \par \par Bacterial pneumonia LLL\par Complex parapneumonic effusion\par Zenker's diverticulum\par \par \par Patient symptoms are much improved from a pulmonary perspective and having no SOB. Given that she completed antibiotics for now 4 weeks will plan for CT scan of the chest to see resolution of infectious process. She is optimized to go for endoscopic procedure from a pulmonary perspective. Infection was likely secondary to aspiration from this diverticulum and closing should resolve this.\par \par She is cleared for the endoscopy and cough could be related to aspiration from the diverticulum.  Follow on the CT scan.  PFT once stable. \par \par \par

## 2023-01-14 LAB
CULTURE RESULTS: SIGNIFICANT CHANGE UP
SPECIMEN SOURCE: SIGNIFICANT CHANGE UP

## 2023-02-03 ENCOUNTER — APPOINTMENT (OUTPATIENT)
Dept: PULMONOLOGY | Facility: CLINIC | Age: 63
End: 2023-02-03
Payer: COMMERCIAL

## 2023-02-03 DIAGNOSIS — Z01.812 ENCOUNTER FOR PREPROCEDURAL LABORATORY EXAMINATION: ICD-10-CM

## 2023-02-03 PROCEDURE — 99443: CPT

## 2023-02-03 NOTE — REASON FOR VISIT
[Home] : at home, [unfilled] , at the time of the educational consult. [Medical Office: ([facility])___] : at the medical office located in  [This encounter was initiated by telehealth (audio with video) and converted to telephone (audio only) due to technical difficulties.] : This encounter was initiated by telehealth (audio with video) and converted to telephone (audio only) due to technical difficulties. [Pneumonia] : pneumonia

## 2023-02-03 NOTE — ASSESSMENT
[FreeTextEntry1] : I reviewed the CT scan report and discussed the finding in details with the patient.  There is resolution of the left consolidation of pleural effusion on the left side.  There is some atelectasis and no change in reticulonodular pattern.  There is a new 1.5 groundglass opacities in the right upper lobe.  I reviewed the previous CT scan in the past images and the findings are new and most likely inflammatory in origin.  We will follow-up with repeat CT scan in 6-8 3 months and I informed the patient to provide me with the CD-ROM for my evaluation.\par \par No indication for antibiotic at this point.\par \par The patient was cleared for the endoscopic procedure with Dr. Centeno she might be chronically aspirating that might explain the groundglass opacity in the right upper lobe

## 2023-02-03 NOTE — HISTORY OF PRESENT ILLNESS
[Never] : never [TextBox_4] : She is following up on the CT scan of the chest.  Just finished a course of acyclovir and she wants to see that she has a cough but no fever [ESS] : 0

## 2023-02-07 ENCOUNTER — APPOINTMENT (OUTPATIENT)
Dept: PULMONOLOGY | Facility: CLINIC | Age: 63
End: 2023-02-07
Payer: COMMERCIAL

## 2023-02-07 PROCEDURE — 99443: CPT

## 2023-02-07 NOTE — HISTORY OF PRESENT ILLNESS
[Never] : never [TextBox_4] : Is following on the CT scan of the chest.  She is doing well.  She is wanted know if she is stable to go with the procedure tomorrow [ESS] : 0

## 2023-02-07 NOTE — ASSESSMENT
[FreeTextEntry1] : I reviewed the CT scan of the chest to compare it to the previous one.  The patient is clinically improving with resolution of the left lower abnormality.  The left upper lobe has an area of bronchiectasis and early fibrotic changes which dramatically improved compared to the previous CT scan.  At this point the patient is clinically stable and can proceed with the procedure.  In my opinion the diverticulum is causing a risk for aspiration and she might benefit from it.

## 2023-02-08 ENCOUNTER — APPOINTMENT (OUTPATIENT)
Dept: GASTROENTEROLOGY | Facility: HOSPITAL | Age: 63
End: 2023-02-08

## 2023-02-08 ENCOUNTER — INPATIENT (INPATIENT)
Facility: HOSPITAL | Age: 63
LOS: 0 days | Discharge: HOME | DRG: 327 | End: 2023-02-09
Payer: COMMERCIAL

## 2023-02-08 VITALS
DIASTOLIC BLOOD PRESSURE: 83 MMHG | RESPIRATION RATE: 15 BRPM | SYSTOLIC BLOOD PRESSURE: 138 MMHG | HEART RATE: 71 BPM | OXYGEN SATURATION: 100 % | TEMPERATURE: 97 F | HEIGHT: 68 IN | WEIGHT: 175.93 LBS

## 2023-02-08 DIAGNOSIS — K50.90 CROHN'S DISEASE, UNSPECIFIED, WITHOUT COMPLICATIONS: ICD-10-CM

## 2023-02-08 DIAGNOSIS — E89.2 POSTPROCEDURAL HYPOPARATHYROIDISM: Chronic | ICD-10-CM

## 2023-02-08 DIAGNOSIS — N60.09 SOLITARY CYST OF UNSPECIFIED BREAST: Chronic | ICD-10-CM

## 2023-02-08 DIAGNOSIS — R63.8 OTHER SYMPTOMS AND SIGNS CONCERNING FOOD AND FLUID INTAKE: ICD-10-CM

## 2023-02-08 DIAGNOSIS — K22.5 DIVERTICULUM OF ESOPHAGUS, ACQUIRED: ICD-10-CM

## 2023-02-08 DIAGNOSIS — Z90.49 ACQUIRED ABSENCE OF OTHER SPECIFIED PARTS OF DIGESTIVE TRACT: Chronic | ICD-10-CM

## 2023-02-08 DIAGNOSIS — R91.8 OTHER NONSPECIFIC ABNORMAL FINDING OF LUNG FIELD: ICD-10-CM

## 2023-02-08 LAB
ALBUMIN SERPL ELPH-MCNC: 3.9 G/DL — SIGNIFICANT CHANGE UP (ref 3.3–5)
ALP SERPL-CCNC: 93 U/L — SIGNIFICANT CHANGE UP (ref 40–120)
ALT FLD-CCNC: <5 U/L — LOW (ref 10–45)
ANION GAP SERPL CALC-SCNC: 10 MMOL/L — SIGNIFICANT CHANGE UP (ref 5–17)
AST SERPL-CCNC: 17 U/L — SIGNIFICANT CHANGE UP (ref 10–40)
BASOPHILS # BLD AUTO: 0.02 K/UL — SIGNIFICANT CHANGE UP (ref 0–0.2)
BASOPHILS NFR BLD AUTO: 0.3 % — SIGNIFICANT CHANGE UP (ref 0–2)
BILIRUB SERPL-MCNC: 0.3 MG/DL — SIGNIFICANT CHANGE UP (ref 0.2–1.2)
BUN SERPL-MCNC: 8 MG/DL — SIGNIFICANT CHANGE UP (ref 7–23)
CALCIUM SERPL-MCNC: 9.8 MG/DL — SIGNIFICANT CHANGE UP (ref 8.4–10.5)
CHLORIDE SERPL-SCNC: 102 MMOL/L — SIGNIFICANT CHANGE UP (ref 96–108)
CO2 SERPL-SCNC: 27 MMOL/L — SIGNIFICANT CHANGE UP (ref 22–31)
CREAT SERPL-MCNC: 0.78 MG/DL — SIGNIFICANT CHANGE UP (ref 0.5–1.3)
EGFR: 87 ML/MIN/1.73M2 — SIGNIFICANT CHANGE UP
EOSINOPHIL # BLD AUTO: 0 K/UL — SIGNIFICANT CHANGE UP (ref 0–0.5)
EOSINOPHIL NFR BLD AUTO: 0 % — SIGNIFICANT CHANGE UP (ref 0–6)
GLUCOSE SERPL-MCNC: 150 MG/DL — HIGH (ref 70–99)
HCT VFR BLD CALC: 40.8 % — SIGNIFICANT CHANGE UP (ref 34.5–45)
HGB BLD-MCNC: 12.7 G/DL — SIGNIFICANT CHANGE UP (ref 11.5–15.5)
IMM GRANULOCYTES NFR BLD AUTO: 0.4 % — SIGNIFICANT CHANGE UP (ref 0–0.9)
LYMPHOCYTES # BLD AUTO: 1.33 K/UL — SIGNIFICANT CHANGE UP (ref 1–3.3)
LYMPHOCYTES # BLD AUTO: 19.8 % — SIGNIFICANT CHANGE UP (ref 13–44)
MCHC RBC-ENTMCNC: 28.5 PG — SIGNIFICANT CHANGE UP (ref 27–34)
MCHC RBC-ENTMCNC: 31.1 GM/DL — LOW (ref 32–36)
MCV RBC AUTO: 91.7 FL — SIGNIFICANT CHANGE UP (ref 80–100)
MONOCYTES # BLD AUTO: 0.03 K/UL — SIGNIFICANT CHANGE UP (ref 0–0.9)
MONOCYTES NFR BLD AUTO: 0.4 % — LOW (ref 2–14)
NEUTROPHILS # BLD AUTO: 5.31 K/UL — SIGNIFICANT CHANGE UP (ref 1.8–7.4)
NEUTROPHILS NFR BLD AUTO: 79.1 % — HIGH (ref 43–77)
NRBC # BLD: 0 /100 WBCS — SIGNIFICANT CHANGE UP (ref 0–0)
PLATELET # BLD AUTO: 502 K/UL — HIGH (ref 150–400)
POTASSIUM SERPL-MCNC: 3.9 MMOL/L — SIGNIFICANT CHANGE UP (ref 3.5–5.3)
POTASSIUM SERPL-SCNC: 3.9 MMOL/L — SIGNIFICANT CHANGE UP (ref 3.5–5.3)
PROT SERPL-MCNC: 8 G/DL — SIGNIFICANT CHANGE UP (ref 6–8.3)
RBC # BLD: 4.45 M/UL — SIGNIFICANT CHANGE UP (ref 3.8–5.2)
RBC # FLD: 14.2 % — SIGNIFICANT CHANGE UP (ref 10.3–14.5)
SODIUM SERPL-SCNC: 139 MMOL/L — SIGNIFICANT CHANGE UP (ref 135–145)
WBC # BLD: 6.72 K/UL — SIGNIFICANT CHANGE UP (ref 3.8–10.5)
WBC # FLD AUTO: 6.72 K/UL — SIGNIFICANT CHANGE UP (ref 3.8–10.5)

## 2023-02-08 PROCEDURE — 99223 1ST HOSP IP/OBS HIGH 75: CPT

## 2023-02-08 PROCEDURE — 99222 1ST HOSP IP/OBS MODERATE 55: CPT | Mod: 25

## 2023-02-08 PROCEDURE — 43247 EGD REMOVE FOREIGN BODY: CPT | Mod: 59

## 2023-02-08 PROCEDURE — 43030 CRICOPHARYNGEAL MYOTOMY: CPT

## 2023-02-08 DEVICE — CLIP HEMO INSTINCT PLUS ENDOSCOPIC: Type: IMPLANTABLE DEVICE | Status: FUNCTIONAL

## 2023-02-08 RX ORDER — PIPERACILLIN AND TAZOBACTAM 4; .5 G/20ML; G/20ML
3.38 INJECTION, POWDER, LYOPHILIZED, FOR SOLUTION INTRAVENOUS ONCE
Refills: 0 | Status: COMPLETED | OUTPATIENT
Start: 2023-02-08 | End: 2023-02-09

## 2023-02-08 RX ORDER — LANOLIN ALCOHOL/MO/W.PET/CERES
3 CREAM (GRAM) TOPICAL AT BEDTIME
Refills: 0 | Status: DISCONTINUED | OUTPATIENT
Start: 2023-02-08 | End: 2023-02-09

## 2023-02-08 RX ORDER — ONDANSETRON 8 MG/1
4 TABLET, FILM COATED ORAL EVERY 8 HOURS
Refills: 0 | Status: DISCONTINUED | OUTPATIENT
Start: 2023-02-08 | End: 2023-02-09

## 2023-02-08 RX ORDER — PIPERACILLIN AND TAZOBACTAM 4; .5 G/20ML; G/20ML
3.38 INJECTION, POWDER, LYOPHILIZED, FOR SOLUTION INTRAVENOUS ONCE
Refills: 0 | Status: DISCONTINUED | OUTPATIENT
Start: 2023-02-09 | End: 2023-02-09

## 2023-02-08 RX ORDER — BUDESONIDE AND FORMOTEROL FUMARATE DIHYDRATE 160; 4.5 UG/1; UG/1
2 AEROSOL RESPIRATORY (INHALATION)
Refills: 0 | Status: DISCONTINUED | OUTPATIENT
Start: 2023-02-08 | End: 2023-02-09

## 2023-02-08 RX ORDER — ACETAMINOPHEN 500 MG
650 TABLET ORAL EVERY 6 HOURS
Refills: 0 | Status: DISCONTINUED | OUTPATIENT
Start: 2023-02-08 | End: 2023-02-09

## 2023-02-08 RX ORDER — PIPERACILLIN AND TAZOBACTAM 4; .5 G/20ML; G/20ML
3.38 INJECTION, POWDER, LYOPHILIZED, FOR SOLUTION INTRAVENOUS EVERY 8 HOURS
Refills: 0 | Status: DISCONTINUED | OUTPATIENT
Start: 2023-02-09 | End: 2023-02-09

## 2023-02-08 RX ORDER — PIPERACILLIN AND TAZOBACTAM 4; .5 G/20ML; G/20ML
3.38 INJECTION, POWDER, LYOPHILIZED, FOR SOLUTION INTRAVENOUS ONCE
Refills: 0 | Status: COMPLETED | OUTPATIENT
Start: 2023-02-08 | End: 2023-02-08

## 2023-02-08 RX ORDER — PIPERACILLIN AND TAZOBACTAM 4; .5 G/20ML; G/20ML
3.38 INJECTION, POWDER, LYOPHILIZED, FOR SOLUTION INTRAVENOUS ONCE
Refills: 0 | Status: COMPLETED | OUTPATIENT
Start: 2023-02-09 | End: 2023-02-09

## 2023-02-08 RX ORDER — PANTOPRAZOLE SODIUM 20 MG/1
40 TABLET, DELAYED RELEASE ORAL EVERY 12 HOURS
Refills: 0 | Status: DISCONTINUED | OUTPATIENT
Start: 2023-02-08 | End: 2023-02-09

## 2023-02-08 RX ORDER — SODIUM CHLORIDE 9 MG/ML
1000 INJECTION, SOLUTION INTRAVENOUS
Refills: 0 | Status: DISCONTINUED | OUTPATIENT
Start: 2023-02-08 | End: 2023-02-09

## 2023-02-08 RX ADMIN — SODIUM CHLORIDE 80 MILLILITER(S): 9 INJECTION, SOLUTION INTRAVENOUS at 18:56

## 2023-02-08 RX ADMIN — PANTOPRAZOLE SODIUM 40 MILLIGRAM(S): 20 TABLET, DELAYED RELEASE ORAL at 18:56

## 2023-02-08 RX ADMIN — PIPERACILLIN AND TAZOBACTAM 200 GRAM(S): 4; .5 INJECTION, POWDER, LYOPHILIZED, FOR SOLUTION INTRAVENOUS at 18:56

## 2023-02-08 NOTE — PATIENT PROFILE ADULT - FALL HARM RISK - UNIVERSAL INTERVENTIONS
Bed in lowest position, wheels locked, appropriate side rails in place/Call bell, personal items and telephone in reach/Instruct patient to call for assistance before getting out of bed or chair/Non-slip footwear when patient is out of bed/[place] to call system/Physically safe environment - no spills, clutter or unnecessary equipment/Purposeful Proactive Rounding/Room/bathroom lighting operational, light cord in reach

## 2023-02-08 NOTE — H&P ADULT - NSHPPHYSICALEXAM_GEN_ALL_CORE
VITAL SIGNS:  T(C): 36 (02-08-23 @ 15:21), Max: 36 (02-08-23 @ 15:21)  T(F): --  HR: 71 (02-08-23 @ 15:21) (71 - 71)  BP: 138/83 (02-08-23 @ 15:21) (138/83 - 138/83)  BP(mean): --  RR: 15 (02-08-23 @ 15:21) (15 - 15)  SpO2: 100% (02-08-23 @ 15:21) (100% - 100%)  Wt(kg): --    PHYSICAL EXAM:    Constitutional: WDWN resting comfortably in bed; NAD  Head: NC/AT  Eyes: PERRL, EOMI, anicteric sclera  ENT: no nasal discharge; uvula midline, no oropharyngeal erythema or exudates; MMM  Neck: supple; no JVD or thyromegaly  Respiratory: CTA B/L; no W/R/R, no retractions  Cardiac: +S1/S2; RRR; no M/R/G; PMI non-displaced  Gastrointestinal: soft, NT/ND; no rebound or guarding; +BSx4  Genitourinary: normal external genitalia  Back: spine midline, no bony tenderness or step-offs; no CVAT B/L  Extremities: WWP, no clubbing or cyanosis; no peripheral edema  Musculoskeletal: NROM x4; no joint swelling, tenderness or erythema  Vascular: 2+ radial, femoral, DP/PT pulses B/L  Dermatologic: skin warm, dry and intact; no rashes, wounds, or scars  Lymphatic: no submandibular or cervical LAD  Neurologic: AAOx3; CNII-XII grossly intact; no focal deficits  Psychiatric: affect and characteristics of appearance, verbalizations, behaviors are appropriate VITAL SIGNS:  T(C): 36 (02-08-23 @ 15:21), Max: 36 (02-08-23 @ 15:21)  T(F): --  HR: 71 (02-08-23 @ 15:21) (71 - 71)  BP: 138/83 (02-08-23 @ 15:21) (138/83 - 138/83)  BP(mean): --  RR: 15 (02-08-23 @ 15:21) (15 - 15)  SpO2: 100% (02-08-23 @ 15:21) (100% - 100%)  Wt(kg): --    PHYSICAL EXAM:    Constitutional: WDWN resting comfortably in bed; NAD  Head: NC/AT  Eyes: PERRL, EOMI, anicteric sclera  ENT: slightly dry MM  Respiratory: CTA B/L; no W/R/R, no retractions.. decreased breath sounds RLL   Cardiac: +S1/S2; RRR; no M/R/G;   Gastrointestinal: soft, NT/ND; no rebound or guarding; +BSx4  Extremities: WWP, no clubbing or cyanosis; no peripheral edema  Musculoskeletal: NROM x4; no joint swelling, tenderness or erythema  Vascular: 2+ radial, PT pulses B/L  Dermatologic: skin warm, dry and intact; no rashes, wounds, or scars  Lymphatic: no submandibular or cervical LAD  Neurologic: AAOx3; CNII-XII grossly intact; no focal deficits

## 2023-02-08 NOTE — H&P ADULT - PROBLEM SELECTOR PLAN 2
Not in acute flair, GI following, no active interventions at this time. Does not take home medications.

## 2023-02-08 NOTE — PRE-ANESTHESIA EVALUATION ADULT - NSANTHOSAYNRD_GEN_A_CORE
No. CHASITY screening performed.  STOP BANG Legend: 0-2 = LOW Risk; 3-4 = INTERMEDIATE Risk; 5-8 = HIGH Risk

## 2023-02-08 NOTE — H&P ADULT - TIME BILLING
Patient seen and examined with house-staff during bedside rounds.  Resident note read, including vitals, physical findings, laboratory data, and radiological reports.   Revisions included below.  Direct personal management at bed side and extensive interpretation of the data.  Plan was outlined and discussed in details with the housestaff.  Decision making of high complexity  Action taken for acute disease activity to reflect the level of care provided:  - medication reconciliation  - review laboratory data  The patient tolerated the procedure well.  Patient will be observed overnight.  Discussed with her and her  again the CT scan.  N.p.o. till tomorrow.

## 2023-02-08 NOTE — H&P ADULT - ASSESSMENT
63 y/o F with PMHx of Crohn's, diverticulosis, asthma, and benign breast cyst (in 1989) presented for an outpatient GI procedure ZPOEM for Zenker diverticula, admitted to medicine for post-procedure monitoring.

## 2023-02-08 NOTE — H&P ADULT - HISTORY OF PRESENT ILLNESS
63 y/o F with PMHx of Crohn's, diverticulosis, asthma, and benign breast cyst (in 1989) presented for an outpatient GI procedure ZPOEM for Zenker diverticula.     The procedure was uncomplicated and the patient was admitted to medicine for overnight monitoring.  63 y/o F with PMHx of Crohn's, diverticulosis, asthma, and benign breast cyst (in 1989) presented for an outpatient GI procedure ZPOEM for Zenker diverticula.     The procedure was uncomplicated and the patient was admitted to medicine for overnight monitoring. She was hospitalized in december for empyema with reactive thrombocytosis, now resolved s/p ABX. She was getting the Zenker procedure to assist in fighting what was thought to be recurrent aspirations i/s/o the diverticulum.     HD stable following procedure, no labs have been obtained at this time. Will get PM labs.

## 2023-02-08 NOTE — CONSULT NOTE ADULT - SUBJECTIVE AND OBJECTIVE BOX
GASTROENTEROLOGY CONSULT NOTE  HPI:  61 y/o F with PMHx of Crohn's, diverticulosis, asthma, and benign breast cyst (in 1989) presented for an outpatient GI procedure ZPOEM for Zenker diverticula.     The procedure was uncomplicated and the patient was admitted to medicine for overnight monitoring.  (08 Feb 2023 17:19)    GI consulted for post Z-POEM observation. Patient seen and examined at bedside.     Allergies    tetracyclines (Swelling)    Intolerances      Home Medications:  Symbicort 160 mcg-4.5 mcg/inh inhalation aerosol: 2 puff(s) inhaled 2 times a day (25 Nov 2022 18:23)    MEDICATIONS:  MEDICATIONS  (STANDING):  budesonide 160 MICROgram(s)/formoterol 4.5 MICROgram(s) Inhaler 2 Puff(s) Inhalation two times a day  pantoprazole  Injectable 40 milliGRAM(s) IV Push every 12 hours    MEDICATIONS  (PRN):  acetaminophen     Tablet .. 650 milliGRAM(s) Oral every 6 hours PRN Temp greater or equal to 38C (100.4F), Mild Pain (1 - 3)  aluminum hydroxide/magnesium hydroxide/simethicone Suspension 30 milliLiter(s) Oral every 4 hours PRN Dyspepsia  melatonin 3 milliGRAM(s) Oral at bedtime PRN Insomnia  ondansetron Injectable 4 milliGRAM(s) IV Push every 8 hours PRN Nausea and/or Vomiting    PAST MEDICAL & SURGICAL HISTORY:  Prediabetes      Diverticulosis      Crohn&#x27;s disease      History of cholecystectomy      S/P parathyroidectomy      Benign cyst of breast        FAMILY HISTORY:  FH: lung cancer (Father)    Family history of CLL (chronic lymphoid leukemia) (Mother)      SOCIAL HISTORY:  Tobacco: [ ] Current, [ ] Former, [ ] Never; Pack Years:  Alcohol:  Illicit Drugs:    REVIEW OF SYSTEMS:  CONSTITUTIONAL: No weakness, fevers or chills  HEENT: No visual changes; No vertigo or throat pain   NECK: No pain or stiffness  RESPIRATORY: No cough, wheezing, hemoptysis; No shortness of breath  CARDIOVASCULAR: No chest pain or palpitations  GASTROINTESTINAL: As above.  GENITOURINARY: No dysuria, frequency or hematuria  NEUROLOGICAL: No numbness or weakness  SKIN: No itching, burning, rashes, or lesions   All other 10 review of systems is negative unless indicated above.    Vital Signs Last 24 Hrs  T(C): 36 (08 Feb 2023 15:21), Max: 36 (08 Feb 2023 15:21)  T(F): --  HR: 71 (08 Feb 2023 15:21) (71 - 71)  BP: 138/83 (08 Feb 2023 15:21) (138/83 - 138/83)  BP(mean): --  RR: 15 (08 Feb 2023 15:21) (15 - 15)  SpO2: 100% (08 Feb 2023 15:21) (100% - 100%)          PHYSICAL EXAM:    General: in no acute distress  Eyes: Anicteric sclerae, moist conjunctivae  HENT: Moist mucous membranes  Neck: Trachea midline, supple  Lungs: Normal respiratory effort, no intercostal retractions  Cardiovascular: RRR  Abdomen: Soft, non-tender non-distended; No rebound or guarding  Extremities: Normal range of motion, No clubbing, cyanosis or edema  Neurological: Alert and oriented x3  Skin: Warm and dry. No obvious rash    LABS:                  RADIOLOGY & ADDITIONAL STUDIES:     Reviewed

## 2023-02-08 NOTE — H&P ADULT - PROBLEM SELECTOR PLAN 4
F: LR 80cc/hr, add D5 pending sugar on CMP   E: replete prn   N: NPO  DVT: None needed   GI: IV PPI BID   Dispo: Likely DC home thurs/fri

## 2023-02-08 NOTE — H&P ADULT - PROBLEM SELECTOR PLAN 3
Pt with RUL GGO's on previous CT imaging of the chest, potential for inflammatory process or recurrent aspirations per outpatient pulm Dr. Delgado.   - c/w Symbicort BID (outpatient med)

## 2023-02-08 NOTE — CONSULT NOTE ADULT - ASSESSMENT
61 y/o F with PMHx of Crohn's, diverticulosis, asthma, and benign breast cyst (in 1989) presented for an outpatient GI procedure ZPOEM for Zenker diverticula.     #Z-POEM  - overall uncomplicated procedure  - please give broad spectrum IV abx, such as zosyn  - NPO, IVF  - esophagram in AM  - IV PPI     Gregory Ibarra MD  PGY-6, Gastroenterology Fellow  pager: [phone] 63 y/o F with PMHx of Crohn's, diverticulosis, asthma, and benign breast cyst (in 1989) presented for an outpatient GI procedure ZPOEM for Zenker diverticula.     #Z-POEM  - please give broad spectrum IV abx, such as zosyn  - NPO, IVF  - esophagram in AM  - IV PPI     Gregory Ibarra MD  PGY-6, Gastroenterology Fellow  pager: [phone]

## 2023-02-08 NOTE — H&P ADULT - PROBLEM SELECTOR PLAN 1
S/p procedure with GI in Endo suite.   - f/u GI recs   - IV Pantoprazole 40mg BID   - Empiric Zosyn post-procedure  - Keep NPO, maintenance fluids  - Esophagram in the AM

## 2023-02-09 ENCOUNTER — TRANSCRIPTION ENCOUNTER (OUTPATIENT)
Age: 63
End: 2023-02-09

## 2023-02-09 VITALS
HEART RATE: 74 BPM | SYSTOLIC BLOOD PRESSURE: 105 MMHG | TEMPERATURE: 98 F | RESPIRATION RATE: 18 BRPM | OXYGEN SATURATION: 96 % | DIASTOLIC BLOOD PRESSURE: 65 MMHG

## 2023-02-09 LAB
ALBUMIN SERPL ELPH-MCNC: 3.5 G/DL — SIGNIFICANT CHANGE UP (ref 3.3–5)
ALP SERPL-CCNC: 83 U/L — SIGNIFICANT CHANGE UP (ref 40–120)
ALT FLD-CCNC: 12 U/L — SIGNIFICANT CHANGE UP (ref 10–45)
ANION GAP SERPL CALC-SCNC: 10 MMOL/L — SIGNIFICANT CHANGE UP (ref 5–17)
AST SERPL-CCNC: 15 U/L — SIGNIFICANT CHANGE UP (ref 10–40)
BILIRUB SERPL-MCNC: 0.2 MG/DL — SIGNIFICANT CHANGE UP (ref 0.2–1.2)
BUN SERPL-MCNC: 8 MG/DL — SIGNIFICANT CHANGE UP (ref 7–23)
CALCIUM SERPL-MCNC: 9.3 MG/DL — SIGNIFICANT CHANGE UP (ref 8.4–10.5)
CHLORIDE SERPL-SCNC: 104 MMOL/L — SIGNIFICANT CHANGE UP (ref 96–108)
CO2 SERPL-SCNC: 25 MMOL/L — SIGNIFICANT CHANGE UP (ref 22–31)
CREAT SERPL-MCNC: 0.68 MG/DL — SIGNIFICANT CHANGE UP (ref 0.5–1.3)
EGFR: 100 ML/MIN/1.73M2 — SIGNIFICANT CHANGE UP
GLUCOSE SERPL-MCNC: 133 MG/DL — HIGH (ref 70–99)
POTASSIUM SERPL-MCNC: 4 MMOL/L — SIGNIFICANT CHANGE UP (ref 3.5–5.3)
POTASSIUM SERPL-SCNC: 4 MMOL/L — SIGNIFICANT CHANGE UP (ref 3.5–5.3)
PROT SERPL-MCNC: 7.3 G/DL — SIGNIFICANT CHANGE UP (ref 6–8.3)
SODIUM SERPL-SCNC: 139 MMOL/L — SIGNIFICANT CHANGE UP (ref 135–145)

## 2023-02-09 PROCEDURE — 74220 X-RAY XM ESOPHAGUS 1CNTRST: CPT | Mod: 26

## 2023-02-09 PROCEDURE — 80053 COMPREHEN METABOLIC PANEL: CPT

## 2023-02-09 PROCEDURE — 74220 X-RAY XM ESOPHAGUS 1CNTRST: CPT

## 2023-02-09 PROCEDURE — C1889: CPT

## 2023-02-09 PROCEDURE — 99239 HOSP IP/OBS DSCHRG MGMT >30: CPT

## 2023-02-09 PROCEDURE — 36415 COLL VENOUS BLD VENIPUNCTURE: CPT

## 2023-02-09 PROCEDURE — 85025 COMPLETE CBC W/AUTO DIFF WBC: CPT

## 2023-02-09 PROCEDURE — 94640 AIRWAY INHALATION TREATMENT: CPT

## 2023-02-09 PROCEDURE — 99231 SBSQ HOSP IP/OBS SF/LOW 25: CPT | Mod: 24

## 2023-02-09 RX ORDER — LEVOFLOXACIN 5 MG/ML
1 INJECTION, SOLUTION INTRAVENOUS
Qty: 5 | Refills: 0
Start: 2023-02-09 | End: 2023-02-13

## 2023-02-09 RX ORDER — DIPHENHYDRAMINE HCL 50 MG
12.5 CAPSULE ORAL ONCE
Refills: 0 | Status: COMPLETED | OUTPATIENT
Start: 2023-02-09 | End: 2023-02-09

## 2023-02-09 RX ORDER — LANOLIN ALCOHOL/MO/W.PET/CERES
1 CREAM (GRAM) TOPICAL
Qty: 30 | Refills: 0
Start: 2023-02-09 | End: 2023-03-10

## 2023-02-09 RX ADMIN — PIPERACILLIN AND TAZOBACTAM 25 GRAM(S): 4; .5 INJECTION, POWDER, LYOPHILIZED, FOR SOLUTION INTRAVENOUS at 01:48

## 2023-02-09 RX ADMIN — BUDESONIDE AND FORMOTEROL FUMARATE DIHYDRATE 2 PUFF(S): 160; 4.5 AEROSOL RESPIRATORY (INHALATION) at 12:40

## 2023-02-09 RX ADMIN — Medication 12.5 MILLIGRAM(S): at 01:22

## 2023-02-09 RX ADMIN — PANTOPRAZOLE SODIUM 40 MILLIGRAM(S): 20 TABLET, DELAYED RELEASE ORAL at 05:51

## 2023-02-09 RX ADMIN — SODIUM CHLORIDE 80 MILLILITER(S): 9 INJECTION, SOLUTION INTRAVENOUS at 05:51

## 2023-02-09 RX ADMIN — BUDESONIDE AND FORMOTEROL FUMARATE DIHYDRATE 2 PUFF(S): 160; 4.5 AEROSOL RESPIRATORY (INHALATION) at 01:23

## 2023-02-09 RX ADMIN — PIPERACILLIN AND TAZOBACTAM 25 GRAM(S): 4; .5 INJECTION, POWDER, LYOPHILIZED, FOR SOLUTION INTRAVENOUS at 05:51

## 2023-02-09 NOTE — DISCHARGE NOTE PROVIDER - NSDCMRMEDTOKEN_GEN_ALL_CORE_FT
Symbicort 160 mcg-4.5 mcg/inh inhalation aerosol: 2 puff(s) inhaled 2 times a day   levoFLOXacin 750 mg oral tablet: 1 tab(s) orally once a day for 5 days starting 2/10/23 until 2/14/23  melatonin 3 mg oral tablet: 1 tab(s) orally once a day (at bedtime), As needed, Insomnia  Symbicort 160 mcg-4.5 mcg/inh inhalation aerosol: 2 puff(s) inhaled 2 times a day

## 2023-02-09 NOTE — DISCHARGE NOTE PROVIDER - CARE PROVIDER_API CALL
Jae Ivy)  Gastroenterology  [address] [address]  Phone: [phone]  Fax: [phone]  Scheduled Appointment: 02/21/2023    Emmy Delgado)  Critical Care Medicine; Pulmonary Disease  [address], [address] [place] [id number]  Phone: [phone]  Fax: [phone]  Established Patient  Follow Up Time:

## 2023-02-09 NOTE — DISCHARGE NOTE PROVIDER - PROVIDER TOKENS
PROVIDER:[TOKEN:[[id number]],SCHEDULEDAPPT:[02/21/2023]],PROVIDER:[TOKEN:[[id number]],ESTABLISHEDPATIENT:[T]]

## 2023-02-09 NOTE — PROGRESS NOTE ADULT - ASSESSMENT
63 y/o F with PMHx of Crohn's, diverticulosis, asthma, and benign breast cyst (in 1989) presented for an outpatient GI procedure MAYITO for Zenker diverticula.     #Z-EZEQUIEL  - personally reviewed esophagram, no evidence of leak   - can be discharged from GI perspective  - please give levofloxacin 750mg once daily x5 days  - clear liquid diet x3 days, then transition to full liquids, then liberalize  - will arrange outpatient f/u with our office    Gregory Ibarra MD  PGY-6, Gastroenterology Fellow  pager: [phone]

## 2023-02-09 NOTE — PROGRESS NOTE ADULT - TIME BILLING
Patient seen and examined with house-staff during bedside rounds.  Resident note read, including vitals, physical findings, laboratory data, and radiological reports.   Revisions included below.  Direct personal management at bed side and extensive interpretation of the data.  Plan was outlined and discussed in details with the housestaff.  Decision making of high complexity  Action taken for acute disease activity to reflect the level of care provided:  - medication reconciliation  - review laboratory data  she is stable and start liquid.  Continue Levaquin and DC

## 2023-02-09 NOTE — DISCHARGE NOTE PROVIDER - NSDCFUADDAPPT_GEN_ALL_CORE_FT
Please follow-up with your gastroenterologist, Dr. Ivy, as scheduled.    Please call to schedule a follow-up appointment with your primary care provider, Dr. Delgado, within 1-2 weeks of being discharged from the hospital.

## 2023-02-09 NOTE — DISCHARGE NOTE PROVIDER - CARE PROVIDERS DIRECT ADDRESSES
[email or web address]

## 2023-02-09 NOTE — DISCHARGE NOTE NURSING/CASE MANAGEMENT/SOCIAL WORK - PATIENT PORTAL LINK FT
You can access the FollowMyHealth Patient Portal offered by [facility] by registering at the following website: [email or web address]. By joining [employer]’s FollowMyHealth portal, you will also be able to view your health information using other applications (apps) compatible with our system.

## 2023-02-09 NOTE — DISCHARGE NOTE PROVIDER - NSDCCPCAREPLAN_GEN_ALL_CORE_FT
PRINCIPAL DISCHARGE DIAGNOSIS  Diagnosis: Zenker diverticulum  Assessment and Plan of Treatment: A Zenker’s diverticulum is an outpouching that occurs at the junction of the lower part of the throat and the upper portion of the esophagus. The pouch forms because the muscle that divides the throat from the esophagus, the cricopharyngeal (CP) muscle, fails to relax during swallowing. You underwent a Z-POEM procedure (Peroral Endoscopic Myotomy (POEM) for Zenker diverticulum) and your esophagram the next morning showed that the Zenker diverticulum decreased in size and emptying is improving. You are tolerating a clear liquid diet well. Please CONTINUE clear liquid diet for 3 days (2/9 to 2/11), then transition to a full liquid diet for 3 days (2/12-2/14). You were also given antibiotics as a preventative measure during your admission. Please take levofloxacin (Levaquin) 750 mg daily for 5 days (2/10-2/14). It is important that you follow-up with your gastroenterologist, Dr. Ivy, and your primary care provider, Dr. Delgado, within 1-2 weeks of being discharged from the hospital for further management.

## 2023-02-09 NOTE — DISCHARGE NOTE PROVIDER - HOSPITAL COURSE
#Discharge: do not delete    INGA TREVIZO is a 62y Female with a past medical history of _____    Presented with _____, found to have _____    Problem List/Main Diagnoses (system-based):   #     #     #    Patient was discharged to: home    New medications:   Changes to old medications:   Medications that were stopped:     Items to follow up as outpatient: PCP, GI #Discharge: do not delete    INGA TREVIZO is a 61 y/o F with PMHx of Crohn's, diverticulosis, asthma, and benign breast cyst (in 1989) presented for an outpatient GI procedure ZPOEM for Zenker diverticula, admitted to medicine for post-procedure monitoring. Pt underwent esophagram which showed slightly decreased zenker diverticulum w/ improved emptying. She is tolerating clear liquid diet. Stable for d/c home w/ follow-up with GI and PCP.    Problem List/Main Diagnoses (system-based):    #Zenker diverticulum  S/p procedure with GI in Endo suite. S/p IV Pantoprazole 40mg BID during admission. S/p Empiric Zosyn post-procedure 2/8-2/9. Esophagram showing slightly decreased Zenker diverticulum w/ improved diverticulum emptying (since 12/2/22), and small hiatal hernia. Tolerating clear liquid diet.  - continue clear liquid diet x 3 days (2/9-2/11), then transition to full liquid diet x 3 days (2/12-2/14)  - levaquin 750 mg qd x 5 days (2/10-2/14) for ppx  - follow-up with GI    #Crohn's disease.   Not in acute flair, GI following, no active interventions at this time. Does not take home medications.  - follow-up with GI    #Ground glass opacity present on imaging of lung.   Pt with RUL GGO's on previous CT imaging of the chest, potential for inflammatory process or recurrent aspirations per outpatient pulm Dr. Delgado. Home meds include symbicort BID.  - continue Symbicort BID    #Insomnia  Pt c/o insomnia, reportedly takes benadryl PRN at home. Advised to use melatonin 3 mg PO qhs PRN for insomnia instead.  - melatonin 3 mg qhs PRN    Patient was discharged to: home    New medications: levofloxacin 750 mg qd x 5 days (2/10-2/14), melatonin 3 mg qhs PRN for insomnia  Changes to old medications: none  Medications that were stopped: none    Items to follow up as outpatient: PCP, GI #Discharge: do not delete    INGA TREVIZO is a 63 y/o F with PMHx of Crohn's, diverticulosis, asthma, and benign breast cyst (in 1989) presented for an outpatient GI procedure ZPOEM for Zenker diverticula, admitted to medicine for post-procedure monitoring. Pt underwent esophagram which showed slightly decreased zenker diverticulum w/ improved emptying. She is tolerating clear liquid diet. Stable for d/c home w/ follow-up with GI and PCP.    Hospital Course by Problem List/Main Diagnoses:    #Zenker diverticulum  S/p procedure with GI in Endo suite. S/p IV Pantoprazole 40mg BID during admission. S/p Empiric Zosyn post-procedure 2/8-2/9. S/p protonix IV 40 mg BID during admission. Esophagram showing slightly decreased Zenker diverticulum w/ improved diverticulum emptying (since 12/2/22), and small hiatal hernia. Tolerating clear liquid diet.  - continue clear liquid diet x 3 days (2/9-2/11), then transition to full liquid diet x 3 days (2/12-2/14)  - levaquin 750 mg qd x 5 days (2/10-2/14) for ppx  - follow-up with GI    #Crohn's disease.   Not in acute flair, GI following, no active interventions at this time. Does not take home medications.  - follow-up with GI    #Ground glass opacity present on imaging of lung.   Pt with RUL GGO's on previous CT imaging of the chest, potential for inflammatory process or recurrent aspirations per outpatient pulm Dr. Delgado. Home meds include symbicort BID.  - continue Symbicort BID    #Insomnia  Pt c/o insomnia, reportedly takes benadryl PRN at home. Advised to use melatonin 3 mg PO qhs PRN for insomnia instead.  - melatonin 3 mg qhs PRN    Patient was discharged to: home    New medications: levofloxacin 750 mg qd x 5 days (2/10-2/14), melatonin 3 mg qhs PRN for insomnia  Changes to old medications: none  Medications that were stopped: none    Items to follow up as outpatient: PCP, GI

## 2023-02-09 NOTE — PROGRESS NOTE ADULT - SUBJECTIVE AND OBJECTIVE BOX
GASTROENTEROLOGY PROGRESS NOTE  Patient seen and examined at bedside. Feels well. No complaints.     PERTINENT REVIEW OF SYSTEMS:  CONSTITUTIONAL: No weakness, fevers or chills  HEENT: No visual changes; No vertigo or throat pain   GASTROINTESTINAL: As above.  NEUROLOGICAL: No numbness or weakness  SKIN: No itching, burning, rashes, or lesions     Allergies    tetracyclines (Swelling)    Intolerances      MEDICATIONS:  MEDICATIONS  (STANDING):  budesonide 160 MICROgram(s)/formoterol 4.5 MICROgram(s) Inhaler 2 Puff(s) Inhalation two times a day  lactated ringers. 1000 milliLiter(s) (80 mL/Hr) IV Continuous <Continuous>  pantoprazole  Injectable 40 milliGRAM(s) IV Push every 12 hours  piperacillin/tazobactam IVPB.- 3.375 Gram(s) IV Intermittent once  piperacillin/tazobactam IVPB.. 3.375 Gram(s) IV Intermittent every 8 hours    MEDICATIONS  (PRN):  acetaminophen     Tablet .. 650 milliGRAM(s) Oral every 6 hours PRN Temp greater or equal to 38C (100.4F), Mild Pain (1 - 3)  aluminum hydroxide/magnesium hydroxide/simethicone Suspension 30 milliLiter(s) Oral every 4 hours PRN Dyspepsia  melatonin 3 milliGRAM(s) Oral at bedtime PRN Insomnia  ondansetron Injectable 4 milliGRAM(s) IV Push every 8 hours PRN Nausea and/or Vomiting    Vital Signs Last 24 Hrs  T(C): 36.7 (09 Feb 2023 10:08), Max: 36.9 (09 Feb 2023 05:07)  T(F): 98.1 (09 Feb 2023 10:08), Max: 98.4 (09 Feb 2023 05:07)  HR: 74 (09 Feb 2023 10:08) (71 - 86)  BP: 105/65 (09 Feb 2023 10:08) (105/65 - 138/83)  BP(mean): --  RR: 18 (09 Feb 2023 10:08) (15 - 18)  SpO2: 96% (09 Feb 2023 10:08) (93% - 100%)    Parameters below as of 09 Feb 2023 10:08  Patient On (Oxygen Delivery Method): room air        PHYSICAL EXAM:    General: in no acute distress  HEENT: MMM, conjunctiva and sclera clear  Gastrointestinal: Soft non-tender non-distended; No rebound or guarding  Skin: Warm and dry. No obvious rash    LABS:                        12.7   6.72  )-----------( 502      ( 08 Feb 2023 23:00 )             40.8     02-09    139  |  104  |  8   ----------------------------<  133<H>  4.0   |  25  |  0.68    Ca    9.3      09 Feb 2023 05:30    TPro  7.3  /  Alb  3.5  /  TBili  0.2  /  DBili  x   /  AST  15  /  ALT  12  /  AlkPhos  83  02-09                      RADIOLOGY & ADDITIONAL STUDIES:  Reviewed

## 2023-02-14 DIAGNOSIS — Z79.51 LONG TERM (CURRENT) USE OF INHALED STEROIDS: ICD-10-CM

## 2023-02-14 DIAGNOSIS — H54.7 UNSPECIFIED VISUAL LOSS: ICD-10-CM

## 2023-02-14 DIAGNOSIS — Z80.6 FAMILY HISTORY OF LEUKEMIA: ICD-10-CM

## 2023-02-14 DIAGNOSIS — J45.909 UNSPECIFIED ASTHMA, UNCOMPLICATED: ICD-10-CM

## 2023-02-14 DIAGNOSIS — Z90.49 ACQUIRED ABSENCE OF OTHER SPECIFIED PARTS OF DIGESTIVE TRACT: ICD-10-CM

## 2023-02-14 DIAGNOSIS — K22.5 DIVERTICULUM OF ESOPHAGUS, ACQUIRED: ICD-10-CM

## 2023-02-14 DIAGNOSIS — Z79.2 LONG TERM (CURRENT) USE OF ANTIBIOTICS: ICD-10-CM

## 2023-02-14 DIAGNOSIS — G47.00 INSOMNIA, UNSPECIFIED: ICD-10-CM

## 2023-02-14 DIAGNOSIS — Z80.1 FAMILY HISTORY OF MALIGNANT NEOPLASM OF TRACHEA, BRONCHUS AND LUNG: ICD-10-CM

## 2023-02-14 DIAGNOSIS — Z88.1 ALLERGY STATUS TO OTHER ANTIBIOTIC AGENTS STATUS: ICD-10-CM

## 2023-02-14 DIAGNOSIS — E89.2 POSTPROCEDURAL HYPOPARATHYROIDISM: ICD-10-CM

## 2023-02-14 DIAGNOSIS — K44.9 DIAPHRAGMATIC HERNIA WITHOUT OBSTRUCTION OR GANGRENE: ICD-10-CM

## 2023-02-14 DIAGNOSIS — K50.90 CROHN'S DISEASE, UNSPECIFIED, WITHOUT COMPLICATIONS: ICD-10-CM

## 2023-02-21 ENCOUNTER — APPOINTMENT (OUTPATIENT)
Dept: GASTROENTEROLOGY | Facility: CLINIC | Age: 63
End: 2023-02-21
Payer: COMMERCIAL

## 2023-02-21 VITALS
OXYGEN SATURATION: 98 % | DIASTOLIC BLOOD PRESSURE: 80 MMHG | RESPIRATION RATE: 15 BRPM | WEIGHT: 174 LBS | SYSTOLIC BLOOD PRESSURE: 122 MMHG | BODY MASS INDEX: 26.37 KG/M2 | HEIGHT: 68 IN | TEMPERATURE: 97.7 F | HEART RATE: 60 BPM

## 2023-02-21 DIAGNOSIS — K52.9 NONINFECTIVE GASTROENTERITIS AND COLITIS, UNSPECIFIED: ICD-10-CM

## 2023-02-21 DIAGNOSIS — Z78.9 OTHER SPECIFIED HEALTH STATUS: ICD-10-CM

## 2023-02-21 DIAGNOSIS — Z87.01 PERSONAL HISTORY OF PNEUMONIA (RECURRENT): ICD-10-CM

## 2023-02-21 PROCEDURE — 99213 OFFICE O/P EST LOW 20 MIN: CPT | Mod: 24

## 2023-02-21 RX ORDER — AZITHROMYCIN 250 MG/1
250 TABLET, FILM COATED ORAL
Qty: 4 | Refills: 0 | Status: DISCONTINUED | COMMUNITY
Start: 2022-11-23

## 2023-02-21 RX ORDER — ACYCLOVIR 400 MG/1
400 TABLET ORAL
Qty: 50 | Refills: 0 | Status: DISCONTINUED | COMMUNITY
Start: 2023-01-23

## 2023-02-21 RX ORDER — LEVOFLOXACIN 750 MG/1
750 TABLET, FILM COATED ORAL
Qty: 5 | Refills: 0 | Status: DISCONTINUED | COMMUNITY
Start: 2023-02-09

## 2023-02-21 RX ORDER — AMOXICILLIN AND CLAVULANATE POTASSIUM 875; 125 MG/1; MG/1
875-125 TABLET, COATED ORAL
Qty: 20 | Refills: 0 | Status: DISCONTINUED | COMMUNITY
Start: 2023-01-23

## 2023-02-21 RX ORDER — OXYCODONE AND ACETAMINOPHEN 5; 325 MG/1; MG/1
5-325 TABLET ORAL
Qty: 20 | Refills: 0 | Status: DISCONTINUED | COMMUNITY
Start: 2022-11-23

## 2023-02-21 NOTE — PHYSICAL EXAM
[Alert] : alert [Well Developed] : well developed [No Respiratory Distress] : no respiratory distress [Respiration, Rhythm And Depth] : normal respiratory rhythm and effort [Abdomen Tenderness] : non-tender [Abdomen Soft] : soft [Oriented To Time, Place, And Person] : oriented to person, place, and time [Normal Mood] : the mood was normal

## 2023-03-28 ENCOUNTER — APPOINTMENT (OUTPATIENT)
Dept: PULMONOLOGY | Facility: CLINIC | Age: 63
End: 2023-03-28

## 2023-04-18 PROBLEM — K22.5 ZENKER'S DIVERTICULUM: Status: ACTIVE | Noted: 2023-02-21

## 2023-04-18 NOTE — HISTORY OF PRESENT ILLNESS
[FreeTextEntry1] : 62 recovering from recent aspiration pneumonia. Diagnosed with Zenker's on esophagram. Here to discuss treatment. Dysphagia to most small pills, and liquids. Chokes frequently and has chronic phlegm.

## 2023-04-18 NOTE — ASSESSMENT
[FreeTextEntry1] : Discussed Z POEM for Zenker's. Will continue treatment for pneumonia and once cleared from pulmonary will do cricopharyngeal myotomy.

## 2023-04-18 NOTE — PHYSICAL EXAM
[No CVA Tenderness] : no CVA  tenderness [No Clubbing, Cyanosis] : no clubbing or cyanosis of the fingernails [Normal] : oriented to person, place, and time

## 2023-04-19 NOTE — HISTORY OF PRESENT ILLNESS
[FreeTextEntry1] : Patient is a 62 year old female with a history of pneumonia and Zenker's diverticulum who presents for follow up post Z POEM.  Patient underwent Zenker's peroral endoscopic myotomy on 2/8/2023 at [facility].  During the procedure a single diverticulum with a large opening was seen in the cricopharyngeus, a selective full thickness myotomy was performed of the cricopharyngeal muscle, the edges of the myotomy were ablated and the mucosotomy was closed using TTS hemostatic clips.  Patient was hospitalized for observation overnight and was discharged the following day on 2/9/2022.  \par \par Patient presents today stating that she followed the clear liquid diet, followed by all liquids and then started soft foods.  When starting soft foods, she noticed some mild discomfort.  She states that the discomfort is improving daily and she has been able to advance her diet.  During the office visit, the patient also inquired about colon inflammation that was noted on her last colonoscopy with Dr. Keyon Barragan at [place].  The procedure report is not availabe for review today.  Patient denies nausea, vomiting, abdominal pain, dark stool and BRBPR.  \par \par \par

## 2023-04-19 NOTE — ASSESSMENT
[FreeTextEntry1] : Patient is a 62 year old female with a history of pneumonia and Zenker's diverticulum who presents for follow up post Z POEM.\par \par Patient is currently able to tolerate more foods.  She will continue to advance her diet.  Dr. Iyv advised the patient that some initial discomfort is normal and there should be improvement by week three/four post procedure. \par \par Patient advised to follow up with GI provider at [place]; Dr. Barragan due to history of colonic inflammation/Crohn's. Patient also advised a possible IBD consult with Dr. Thapa. \par \par \par I, Anai Key; PA, am scribing for and in the presence of Dr. Jae Ivy the following sections: history of present illness, past medical/family/social history; review of systems; vital signs; physical exam; disposition.\par

## 2023-05-23 NOTE — ED PROVIDER NOTE - CADM POA PRESS ULCER

## 2023-05-28 ENCOUNTER — APPOINTMENT (OUTPATIENT)
Dept: CT IMAGING | Facility: HOSPITAL | Age: 63
End: 2023-05-28

## 2023-08-10 ENCOUNTER — APPOINTMENT (OUTPATIENT)
Dept: UROLOGY | Facility: CLINIC | Age: 63
End: 2023-08-10
Payer: COMMERCIAL

## 2023-08-10 VITALS
TEMPERATURE: 98 F | OXYGEN SATURATION: 95 % | SYSTOLIC BLOOD PRESSURE: 126 MMHG | DIASTOLIC BLOOD PRESSURE: 79 MMHG | HEART RATE: 78 BPM

## 2023-08-10 PROCEDURE — 99214 OFFICE O/P EST MOD 30 MIN: CPT

## 2023-08-10 RX ORDER — GINGER ROOT/GINGER ROOT EXT 262.5 MG
5000 CAPSULE ORAL
Refills: 0 | Status: ACTIVE | COMMUNITY

## 2023-08-10 RX ORDER — ROSUVASTATIN CALCIUM 10 MG/1
10 TABLET, FILM COATED ORAL
Refills: 0 | Status: ACTIVE | COMMUNITY

## 2023-08-13 NOTE — PHYSICAL EXAM
[General Appearance - Well Developed] : well developed [General Appearance - Well Nourished] : well nourished [Normal Appearance] : normal appearance [Well Groomed] : well groomed [General Appearance - In No Acute Distress] : no acute distress [Edema] : no peripheral edema [Respiration, Rhythm And Depth] : normal respiratory rhythm and effort [Exaggerated Use Of Accessory Muscles For Inspiration] : no accessory muscle use [Abdomen Soft] : soft [Abdomen Tenderness] : non-tender [Costovertebral Angle Tenderness] : no ~M costovertebral angle tenderness [Urethral Meatus] : the meatus of the urethra showed no abnormalities [External Female Genitalia] : normal external genitalia [Vagina] : normal vaginal exam [Normal Station and Gait] : the gait and station were normal for the patient's age [] : no rash [Oriented To Time, Place, And Person] : oriented to person, place, and time [Affect] : the affect was normal [Mood] : the mood was normal [Not Anxious] : not anxious [FreeTextEntry1] : PE--> +UH, -CST, no apical, stage 1 cystocele with Valsalva, stage 1 rectocele

## 2023-08-13 NOTE — ASSESSMENT
[FreeTextEntry1] :   Impression/Plan: 63 year old  (NSVDx15) female with stage I cystocele and stage I rectocele  -BM and BRT -recommend Kegel exercise  -reassured given mild prolapse, PFE for now and observation -if symptoms worsen f/u sooner   RTC 6 months

## 2023-08-13 NOTE — HISTORY OF PRESENT ILLNESS
[FreeTextEntry1] : 63 year old female  here for complaint of vaginal prolapse. She reports prolapse sensation which has become more uncomfortable for the past ~ 2 years especially when sitting on toilet to wipe herself. Has urgency and at times urge urinary incontinence. No pad usage. Has constipation managed by fiber supplement. She verbalizes not bothersome.  Denies fevers, chills, nausea, vomiting, hematuria, dysuria, UTI, strain to void, urinary frequency, or sexual activity  8/10/2023 PVR - 32 ml  PMH: HLD, borderline Dm SurgHX: gallbladder, parathyroid, zenkers diverticulum FH: mother  from CLL, brother being treated for CLL, and grandchild with ?lymphoma. No  malignancies Social: nonsmoker, very little ETOH, ( NSVDx15) Medications: Crestor, psyllium husks, vitamin D, probiotic Allergies: tetracycline

## 2023-10-24 ENCOUNTER — NON-APPOINTMENT (OUTPATIENT)
Age: 63
End: 2023-10-24

## 2024-01-05 NOTE — PRE-ANESTHESIA EVALUATION ADULT - NSATTENDATTESTRD_GEN_ALL_CORE
DISPLAY PLAN FREE TEXT The patient has been re-examined and I agree with the above assessment or I updated with my findings.

## 2024-02-27 ENCOUNTER — APPOINTMENT (OUTPATIENT)
Dept: UROLOGY | Facility: CLINIC | Age: 64
End: 2024-02-27
Payer: COMMERCIAL

## 2024-02-27 VITALS
TEMPERATURE: 97.3 F | SYSTOLIC BLOOD PRESSURE: 139 MMHG | OXYGEN SATURATION: 97 % | HEART RATE: 68 BPM | DIASTOLIC BLOOD PRESSURE: 83 MMHG

## 2024-02-27 DIAGNOSIS — N81.6 RECTOCELE: ICD-10-CM

## 2024-02-27 DIAGNOSIS — N81.11 CYSTOCELE, MIDLINE: ICD-10-CM

## 2024-02-27 PROCEDURE — 99214 OFFICE O/P EST MOD 30 MIN: CPT

## 2024-02-29 PROBLEM — N81.6 RECTOCELE, FEMALE: Status: ACTIVE | Noted: 2023-08-11

## 2024-02-29 PROBLEM — N81.11 CYSTOCELE, MIDLINE: Status: ACTIVE | Noted: 2023-08-11

## 2024-02-29 NOTE — ASSESSMENT
[FreeTextEntry1] :   Impression/Plan: 63-year-old female with POP-Stage 2 cystocele, Stage 1-2 rectocele.   -Discussed options for managing POP. Pessary recommended. Fitting, risks and follow-up explained.   -Patient will consider options and call office if she decides to move forward with having a pessary placed.   I, Dr. Lisa West, personally performed the evaluation and management (E/M) services for this established patient who presents today with (a) new problem(s)/exacerbation of (an) existing condition(s).  That E/M includes conducting the clinically appropriate interval history &/or exam, assessing all new/exacerbated conditions, and establishing a new plan of care.  Today, my SHANIQUE Shayy, was here to observe &/or participate in the visit & follow plan of care established by me.

## 2024-02-29 NOTE — PHYSICAL EXAM
[General Appearance - Well Developed] : well developed [General Appearance - Well Nourished] : well nourished [Well Groomed] : well groomed [Normal Appearance] : normal appearance [General Appearance - In No Acute Distress] : no acute distress [Exaggerated Use Of Accessory Muscles For Inspiration] : no accessory muscle use [] : no respiratory distress [External Female Genitalia] : normal external genitalia [Normal Station and Gait] : the gait and station were normal for the patient's age [No Focal Deficits] : no focal deficits [Skin Color & Pigmentation] : normal skin color and pigmentation [Affect] : the affect was normal [Oriented To Time, Place, And Person] : oriented to person, place, and time [Mood] : the mood was normal [Not Anxious] : not anxious [de-identified] : Stage 2 cystocele, Stage 1-2 rectocele

## 2024-02-29 NOTE — PHYSICAL EXAM
[General Appearance - Well Developed] : well developed [General Appearance - Well Nourished] : well nourished [Well Groomed] : well groomed [Normal Appearance] : normal appearance [General Appearance - In No Acute Distress] : no acute distress [External Female Genitalia] : normal external genitalia [Exaggerated Use Of Accessory Muscles For Inspiration] : no accessory muscle use [] : no respiratory distress [Normal Station and Gait] : the gait and station were normal for the patient's age [Skin Color & Pigmentation] : normal skin color and pigmentation [No Focal Deficits] : no focal deficits [Oriented To Time, Place, And Person] : oriented to person, place, and time [Affect] : the affect was normal [Mood] : the mood was normal [Not Anxious] : not anxious [de-identified] : Stage 2 cystocele, Stage 1-2 rectocele

## 2024-02-29 NOTE — HISTORY OF PRESENT ILLNESS
[FreeTextEntry1] : 8/10/2023 63 year old female  here for complaint of vaginal prolapse. She reports prolapse sensation which has become more uncomfortable for the past ~ 2 years especially when sitting on toilet to wipe herself. Has urgency and at times urge urinary incontinence. No pad usage. Has constipation managed by fiber supplement. She verbalizes not bothersome.  Denies fevers, chills, nausea, vomiting, hematuria, dysuria, UTI, strain to void, urinary frequency, or sexual activity  8/10/2023 PVR - 32 ml  PMH: HLD, borderline Dm SurgHX: gallbladder, parathyroid, zenkers diverticulum FH: mother  from CLL, brother being treated for CLL, and grandchild with ?lymphoma. No  malignancies Social: nonsmoker, very little ETOH, ( [id number]) Medications: Crestor, psyllium husks, vitamin D, probiotic Allergies: tetracycline  2024 63-year-old female seen previously for POP- stage I cystocele and stage I rectocele. Tried Kegel exercises but denies improvement. States prolapse protrudes with BM. She still has some urgency but denies incontinence. Would like to discuss options.

## 2024-10-10 ENCOUNTER — APPOINTMENT (OUTPATIENT)
Dept: PULMONOLOGY | Facility: CLINIC | Age: 64
End: 2024-10-10
Payer: COMMERCIAL

## 2024-10-10 ENCOUNTER — RESULT REVIEW (OUTPATIENT)
Age: 64
End: 2024-10-10

## 2024-10-10 VITALS
HEIGHT: 69 IN | HEART RATE: 71 BPM | OXYGEN SATURATION: 97 % | WEIGHT: 188 LBS | DIASTOLIC BLOOD PRESSURE: 83 MMHG | TEMPERATURE: 98.3 F | RESPIRATION RATE: 16 BRPM | BODY MASS INDEX: 27.85 KG/M2 | SYSTOLIC BLOOD PRESSURE: 134 MMHG

## 2024-10-10 DIAGNOSIS — J47.9 BRONCHIECTASIS, UNCOMPLICATED: ICD-10-CM

## 2024-10-10 DIAGNOSIS — J45.30 MILD PERSISTENT ASTHMA, UNCOMPLICATED: ICD-10-CM

## 2024-10-10 PROCEDURE — G2211 COMPLEX E/M VISIT ADD ON: CPT | Mod: NC

## 2024-10-10 PROCEDURE — 99214 OFFICE O/P EST MOD 30 MIN: CPT

## 2024-12-19 ENCOUNTER — APPOINTMENT (OUTPATIENT)
Dept: HEART AND VASCULAR | Facility: CLINIC | Age: 64
End: 2024-12-19
Payer: COMMERCIAL

## 2024-12-19 ENCOUNTER — NON-APPOINTMENT (OUTPATIENT)
Age: 64
End: 2024-12-19

## 2024-12-19 VITALS
DIASTOLIC BLOOD PRESSURE: 75 MMHG | BODY MASS INDEX: 28.44 KG/M2 | OXYGEN SATURATION: 98 % | WEIGHT: 192 LBS | SYSTOLIC BLOOD PRESSURE: 110 MMHG | HEIGHT: 69 IN | TEMPERATURE: 98.1 F | HEART RATE: 70 BPM

## 2024-12-19 DIAGNOSIS — R01.1 CARDIAC MURMUR, UNSPECIFIED: ICD-10-CM

## 2024-12-19 DIAGNOSIS — E78.5 HYPERLIPIDEMIA, UNSPECIFIED: ICD-10-CM

## 2024-12-19 DIAGNOSIS — R79.82 ELEVATED C-REACTIVE PROTEIN (CRP): ICD-10-CM

## 2024-12-19 PROCEDURE — 99203 OFFICE O/P NEW LOW 30 MIN: CPT

## 2024-12-19 PROCEDURE — G2211 COMPLEX E/M VISIT ADD ON: CPT | Mod: NC

## 2024-12-19 PROCEDURE — 99204 OFFICE O/P NEW MOD 45 MIN: CPT

## 2024-12-19 PROCEDURE — 93000 ELECTROCARDIOGRAM COMPLETE: CPT

## 2024-12-19 RX ORDER — OMEGA-3/DHA/EPA/FISH OIL 300-1000MG
CAPSULE ORAL
Refills: 0 | Status: ACTIVE | COMMUNITY

## 2024-12-19 RX ORDER — DIPHENHYDRAMINE HYDROCHLORIDE, ZINC ACETATE 20; 1 MG/G; MG/G
CREAM TOPICAL
Refills: 0 | Status: ACTIVE | COMMUNITY

## 2025-01-03 ENCOUNTER — TRANSCRIPTION ENCOUNTER (OUTPATIENT)
Age: 65
End: 2025-01-03

## 2025-01-07 ENCOUNTER — APPOINTMENT (OUTPATIENT)
Dept: HEART AND VASCULAR | Facility: CLINIC | Age: 65
End: 2025-01-07
Payer: COMMERCIAL

## 2025-01-07 DIAGNOSIS — I07.1 RHEUMATIC TRICUSPID INSUFFICIENCY: ICD-10-CM

## 2025-01-07 PROCEDURE — 93306 TTE W/DOPPLER COMPLETE: CPT

## 2025-01-08 PROBLEM — I07.1 MILD TRICUSPID REGURGITATION: Status: ACTIVE | Noted: 2025-01-08

## 2025-01-27 ENCOUNTER — NON-APPOINTMENT (OUTPATIENT)
Age: 65
End: 2025-01-27

## 2025-01-28 ENCOUNTER — APPOINTMENT (OUTPATIENT)
Dept: PULMONOLOGY | Facility: CLINIC | Age: 65
End: 2025-01-28
Payer: COMMERCIAL

## 2025-01-28 VITALS
DIASTOLIC BLOOD PRESSURE: 77 MMHG | HEART RATE: 64 BPM | BODY MASS INDEX: 28.14 KG/M2 | TEMPERATURE: 98.1 F | OXYGEN SATURATION: 96 % | SYSTOLIC BLOOD PRESSURE: 129 MMHG | HEIGHT: 69 IN | RESPIRATION RATE: 16 BRPM | WEIGHT: 190 LBS

## 2025-01-28 DIAGNOSIS — J90 PLEURAL EFFUSION, NOT ELSEWHERE CLASSIFIED: ICD-10-CM

## 2025-01-28 DIAGNOSIS — J45.30 MILD PERSISTENT ASTHMA, UNCOMPLICATED: ICD-10-CM

## 2025-01-28 DIAGNOSIS — J47.9 BRONCHIECTASIS, UNCOMPLICATED: ICD-10-CM

## 2025-01-28 PROCEDURE — G2211 COMPLEX E/M VISIT ADD ON: CPT | Mod: NC

## 2025-01-28 PROCEDURE — 99213 OFFICE O/P EST LOW 20 MIN: CPT

## 2025-03-07 RX ORDER — AMOXICILLIN AND CLAVULANATE POTASSIUM 875; 125 MG/1; MG/1
875-125 TABLET, COATED ORAL
Qty: 20 | Refills: 0 | Status: ACTIVE | COMMUNITY
Start: 2025-03-07 | End: 1900-01-01

## (undated) DEVICE — PROBE HYBRIDKNIFE T TYPE OD 2.3MMX1.9M

## (undated) DEVICE — CARTRIDGE ERBEJET 2 PUMP

## (undated) DEVICE — Device